# Patient Record
Sex: FEMALE | Race: WHITE | NOT HISPANIC OR LATINO | Employment: OTHER | URBAN - METROPOLITAN AREA
[De-identification: names, ages, dates, MRNs, and addresses within clinical notes are randomized per-mention and may not be internally consistent; named-entity substitution may affect disease eponyms.]

---

## 2020-04-15 ENCOUNTER — APPOINTMENT (OUTPATIENT)
Dept: RADIOLOGY | Facility: MEDICAL CENTER | Age: 70
End: 2020-04-15
Attending: INTERNAL MEDICINE
Payer: MEDICARE

## 2021-03-11 ENCOUNTER — IMMUNIZATION (OUTPATIENT)
Dept: FAMILY PLANNING/WOMEN'S HEALTH CLINIC | Facility: IMMUNIZATION CENTER | Age: 71
End: 2021-03-11
Payer: MEDICARE

## 2021-03-11 DIAGNOSIS — Z23 ENCOUNTER FOR VACCINATION: Primary | ICD-10-CM

## 2021-03-11 PROCEDURE — 0001A PFIZER SARS-COV-2 VACCINE: CPT | Performed by: INTERNAL MEDICINE

## 2021-03-11 PROCEDURE — 91300 PFIZER SARS-COV-2 VACCINE: CPT | Performed by: INTERNAL MEDICINE

## 2021-04-02 ENCOUNTER — IMMUNIZATION (OUTPATIENT)
Dept: FAMILY PLANNING/WOMEN'S HEALTH CLINIC | Facility: IMMUNIZATION CENTER | Age: 71
End: 2021-04-02
Attending: INTERNAL MEDICINE
Payer: MEDICARE

## 2021-04-02 DIAGNOSIS — Z23 ENCOUNTER FOR VACCINATION: Primary | ICD-10-CM

## 2021-04-02 PROCEDURE — 0002A PFIZER SARS-COV-2 VACCINE: CPT

## 2021-04-02 PROCEDURE — 91300 PFIZER SARS-COV-2 VACCINE: CPT

## 2022-04-29 ENCOUNTER — APPOINTMENT (OUTPATIENT)
Dept: RADIOLOGY | Facility: MEDICAL CENTER | Age: 72
DRG: 481 | End: 2022-04-29
Attending: EMERGENCY MEDICINE
Payer: MEDICARE

## 2022-04-29 ENCOUNTER — ANESTHESIA EVENT (OUTPATIENT)
Dept: SURGERY | Facility: MEDICAL CENTER | Age: 72
DRG: 481 | End: 2022-04-29
Payer: MEDICARE

## 2022-04-29 ENCOUNTER — HOSPITAL ENCOUNTER (INPATIENT)
Facility: MEDICAL CENTER | Age: 72
LOS: 4 days | DRG: 481 | End: 2022-05-03
Attending: EMERGENCY MEDICINE | Admitting: STUDENT IN AN ORGANIZED HEALTH CARE EDUCATION/TRAINING PROGRAM
Payer: MEDICARE

## 2022-04-29 ENCOUNTER — APPOINTMENT (OUTPATIENT)
Dept: RADIOLOGY | Facility: MEDICAL CENTER | Age: 72
DRG: 481 | End: 2022-04-29
Attending: ORTHOPAEDIC SURGERY
Payer: MEDICARE

## 2022-04-29 ENCOUNTER — ANESTHESIA (OUTPATIENT)
Dept: SURGERY | Facility: MEDICAL CENTER | Age: 72
DRG: 481 | End: 2022-04-29
Payer: MEDICARE

## 2022-04-29 DIAGNOSIS — S72.001A CLOSED FRACTURE OF RIGHT HIP, INITIAL ENCOUNTER (HCC): ICD-10-CM

## 2022-04-29 DIAGNOSIS — S72.001A CLOSED FRACTURE OF NECK OF RIGHT FEMUR, INITIAL ENCOUNTER (HCC): ICD-10-CM
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
## 2022-04-29 DIAGNOSIS — S62.101A CLOSED FRACTURE OF RIGHT WRIST, INITIAL ENCOUNTER: ICD-10-CM

## 2022-04-29 LAB
ABO GROUP BLD: NORMAL
ALBUMIN SERPL BCP-MCNC: 4 G/DL (ref 3.2–4.9)
ALBUMIN/GLOB SERPL: 1.7 G/DL
ALP SERPL-CCNC: 76 U/L (ref 30–99)
ALT SERPL-CCNC: 32 U/L (ref 2–50)
ANION GAP SERPL CALC-SCNC: 14 MMOL/L (ref 7–16)
APTT PPP: 26.6 SEC (ref 24.7–36)
AST SERPL-CCNC: 27 U/L (ref 12–45)
BASOPHILS # BLD AUTO: 0.1 % (ref 0–1.8)
BASOPHILS # BLD: 0.01 K/UL (ref 0–0.12)
BILIRUB SERPL-MCNC: 0.3 MG/DL (ref 0.1–1.5)
BLD GP AB SCN SERPL QL: NORMAL
BUN SERPL-MCNC: 21 MG/DL (ref 8–22)
CALCIUM SERPL-MCNC: 9.3 MG/DL (ref 8.5–10.5)
CHLORIDE SERPL-SCNC: 105 MMOL/L (ref 96–112)
CO2 SERPL-SCNC: 20 MMOL/L (ref 20–33)
CREAT SERPL-MCNC: 0.73 MG/DL (ref 0.5–1.4)
EKG IMPRESSION: NORMAL
EOSINOPHIL # BLD AUTO: 0.01 K/UL (ref 0–0.51)
EOSINOPHIL NFR BLD: 0.1 % (ref 0–6.9)
ERYTHROCYTE [DISTWIDTH] IN BLOOD BY AUTOMATED COUNT: 51 FL (ref 35.9–50)
GFR SERPLBLD CREATININE-BSD FMLA CKD-EPI: 88 ML/MIN/1.73 M 2
GLOBULIN SER CALC-MCNC: 2.4 G/DL (ref 1.9–3.5)
GLUCOSE SERPL-MCNC: 112 MG/DL (ref 65–99)
HCT VFR BLD AUTO: 39.1 % (ref 37–47)
HGB BLD-MCNC: 12.7 G/DL (ref 12–16)
IMM GRANULOCYTES # BLD AUTO: 0.06 K/UL (ref 0–0.11)
IMM GRANULOCYTES NFR BLD AUTO: 0.7 % (ref 0–0.9)
INR PPP: 1.03 (ref 0.87–1.13)
LYMPHOCYTES # BLD AUTO: 1.2 K/UL (ref 1–4.8)
LYMPHOCYTES NFR BLD: 14.2 % (ref 22–41)
MCH RBC QN AUTO: 32.4 PG (ref 27–33)
MCHC RBC AUTO-ENTMCNC: 32.5 G/DL (ref 33.6–35)
MCV RBC AUTO: 99.7 FL (ref 81.4–97.8)
MONOCYTES # BLD AUTO: 0.61 K/UL (ref 0–0.85)
MONOCYTES NFR BLD AUTO: 7.2 % (ref 0–13.4)
NEUTROPHILS # BLD AUTO: 6.59 K/UL (ref 2–7.15)
NEUTROPHILS NFR BLD: 77.7 % (ref 44–72)
NRBC # BLD AUTO: 0 K/UL
NRBC BLD-RTO: 0 /100 WBC
PLATELET # BLD AUTO: 69 K/UL (ref 164–446)
PMV BLD AUTO: 10 FL (ref 9–12.9)
POTASSIUM SERPL-SCNC: 4 MMOL/L (ref 3.6–5.5)
PROT SERPL-MCNC: 6.4 G/DL (ref 6–8.2)
PROTHROMBIN TIME: 13.2 SEC (ref 12–14.6)
RBC # BLD AUTO: 3.92 M/UL (ref 4.2–5.4)
RH BLD: NORMAL
SARS-COV+SARS-COV-2 AG RESP QL IA.RAPID: NOTDETECTED
SODIUM SERPL-SCNC: 139 MMOL/L (ref 135–145)
SPECIMEN SOURCE: NORMAL
WBC # BLD AUTO: 8.5 K/UL (ref 4.8–10.8)

## 2022-04-29 PROCEDURE — 64447 NJX AA&/STRD FEMORAL NRV IMG: CPT | Mod: 59 | Performed by: STUDENT IN AN ORGANIZED HEALTH CARE EDUCATION/TRAINING PROGRAM

## 2022-04-29 PROCEDURE — 0QS604Z REPOSITION RIGHT UPPER FEMUR WITH INTERNAL FIXATION DEVICE, OPEN APPROACH: ICD-10-PCS | Performed by: ORTHOPAEDIC SURGERY

## 2022-04-29 PROCEDURE — 86850 RBC ANTIBODY SCREEN: CPT

## 2022-04-29 PROCEDURE — 700102 HCHG RX REV CODE 250 W/ 637 OVERRIDE(OP): Performed by: PHYSICIAN ASSISTANT

## 2022-04-29 PROCEDURE — 87426 SARSCOV CORONAVIRUS AG IA: CPT

## 2022-04-29 PROCEDURE — 700111 HCHG RX REV CODE 636 W/ 250 OVERRIDE (IP): Performed by: EMERGENCY MEDICINE

## 2022-04-29 PROCEDURE — 501838 HCHG SUTURE GENERAL: Performed by: ORTHOPAEDIC SURGERY

## 2022-04-29 PROCEDURE — 76942 ECHO GUIDE FOR BIOPSY: CPT | Mod: 26 | Performed by: STUDENT IN AN ORGANIZED HEALTH CARE EDUCATION/TRAINING PROGRAM

## 2022-04-29 PROCEDURE — 700101 HCHG RX REV CODE 250: Performed by: STUDENT IN AN ORGANIZED HEALTH CARE EDUCATION/TRAINING PROGRAM

## 2022-04-29 PROCEDURE — 160035 HCHG PACU - 1ST 60 MINS PHASE I: Performed by: ORTHOPAEDIC SURGERY

## 2022-04-29 PROCEDURE — 96375 TX/PRO/DX INJ NEW DRUG ADDON: CPT

## 2022-04-29 PROCEDURE — 64447 NJX AA&/STRD FEMORAL NRV IMG: CPT | Performed by: ORTHOPAEDIC SURGERY

## 2022-04-29 PROCEDURE — 160036 HCHG PACU - EA ADDL 30 MINS PHASE I: Performed by: ORTHOPAEDIC SURGERY

## 2022-04-29 PROCEDURE — 160041 HCHG SURGERY MINUTES - EA ADDL 1 MIN LEVEL 4: Performed by: ORTHOPAEDIC SURGERY

## 2022-04-29 PROCEDURE — 80053 COMPREHEN METABOLIC PANEL: CPT

## 2022-04-29 PROCEDURE — 73552 X-RAY EXAM OF FEMUR 2/>: CPT | Mod: RT

## 2022-04-29 PROCEDURE — 160009 HCHG ANES TIME/MIN: Performed by: ORTHOPAEDIC SURGERY

## 2022-04-29 PROCEDURE — 160029 HCHG SURGERY MINUTES - 1ST 30 MINS LEVEL 4: Performed by: ORTHOPAEDIC SURGERY

## 2022-04-29 PROCEDURE — 96374 THER/PROPH/DIAG INJ IV PUSH: CPT

## 2022-04-29 PROCEDURE — 502000 HCHG MISC OR IMPLANTS RC 0278: Performed by: ORTHOPAEDIC SURGERY

## 2022-04-29 PROCEDURE — 71045 X-RAY EXAM CHEST 1 VIEW: CPT

## 2022-04-29 PROCEDURE — 99291 CRITICAL CARE FIRST HOUR: CPT

## 2022-04-29 PROCEDURE — 99223 1ST HOSP IP/OBS HIGH 75: CPT | Mod: AI | Performed by: STUDENT IN AN ORGANIZED HEALTH CARE EDUCATION/TRAINING PROGRAM

## 2022-04-29 PROCEDURE — 93005 ELECTROCARDIOGRAM TRACING: CPT | Performed by: EMERGENCY MEDICINE

## 2022-04-29 PROCEDURE — 73502 X-RAY EXAM HIP UNI 2-3 VIEWS: CPT | Mod: RT

## 2022-04-29 PROCEDURE — C1713 ANCHOR/SCREW BN/BN,TIS/BN: HCPCS | Performed by: ORTHOPAEDIC SURGERY

## 2022-04-29 PROCEDURE — 73110 X-RAY EXAM OF WRIST: CPT | Mod: RT

## 2022-04-29 PROCEDURE — 85025 COMPLETE CBC W/AUTO DIFF WBC: CPT

## 2022-04-29 PROCEDURE — 86900 BLOOD TYPING SEROLOGIC ABO: CPT

## 2022-04-29 PROCEDURE — 99100 ANES PT EXTEME AGE<1 YR&>70: CPT | Performed by: STUDENT IN AN ORGANIZED HEALTH CARE EDUCATION/TRAINING PROGRAM

## 2022-04-29 PROCEDURE — 770001 HCHG ROOM/CARE - MED/SURG/GYN PRIV*

## 2022-04-29 PROCEDURE — 700111 HCHG RX REV CODE 636 W/ 250 OVERRIDE (IP): Performed by: STUDENT IN AN ORGANIZED HEALTH CARE EDUCATION/TRAINING PROGRAM

## 2022-04-29 PROCEDURE — 85610 PROTHROMBIN TIME: CPT

## 2022-04-29 PROCEDURE — 160002 HCHG RECOVERY MINUTES (STAT): Performed by: ORTHOPAEDIC SURGERY

## 2022-04-29 PROCEDURE — 01230 ANES OPN UPPER 2/3 FEMUR NOS: CPT | Performed by: STUDENT IN AN ORGANIZED HEALTH CARE EDUCATION/TRAINING PROGRAM

## 2022-04-29 PROCEDURE — 160048 HCHG OR STATISTICAL LEVEL 1-5: Performed by: ORTHOPAEDIC SURGERY

## 2022-04-29 PROCEDURE — 73560 X-RAY EXAM OF KNEE 1 OR 2: CPT | Mod: RT

## 2022-04-29 PROCEDURE — A9270 NON-COVERED ITEM OR SERVICE: HCPCS | Performed by: PHYSICIAN ASSISTANT

## 2022-04-29 PROCEDURE — A9270 NON-COVERED ITEM OR SERVICE: HCPCS

## 2022-04-29 PROCEDURE — 2W3CX1Z IMMOBILIZATION OF RIGHT LOWER ARM USING SPLINT: ICD-10-PCS | Performed by: ORTHOPAEDIC SURGERY

## 2022-04-29 PROCEDURE — 72170 X-RAY EXAM OF PELVIS: CPT

## 2022-04-29 PROCEDURE — 86901 BLOOD TYPING SEROLOGIC RH(D): CPT

## 2022-04-29 PROCEDURE — 85730 THROMBOPLASTIN TIME PARTIAL: CPT

## 2022-04-29 PROCEDURE — 700105 HCHG RX REV CODE 258: Performed by: STUDENT IN AN ORGANIZED HEALTH CARE EDUCATION/TRAINING PROGRAM

## 2022-04-29 PROCEDURE — 700102 HCHG RX REV CODE 250 W/ 637 OVERRIDE(OP)

## 2022-04-29 PROCEDURE — 36415 COLL VENOUS BLD VENIPUNCTURE: CPT

## 2022-04-29 DEVICE — IMPLANTABLE DEVICE: Type: IMPLANTABLE DEVICE | Site: FEMUR | Status: FUNCTIONAL

## 2022-04-29 RX ORDER — LIDOCAINE HYDROCHLORIDE 20 MG/ML
INJECTION, SOLUTION EPIDURAL; INFILTRATION; INTRACAUDAL; PERINEURAL PRN
Status: DISCONTINUED | OUTPATIENT
Start: 2022-04-29 | End: 2022-04-29 | Stop reason: SURG

## 2022-04-29 RX ORDER — HYDROMORPHONE HYDROCHLORIDE 1 MG/ML
0.2 INJECTION, SOLUTION INTRAMUSCULAR; INTRAVENOUS; SUBCUTANEOUS
Status: DISCONTINUED | OUTPATIENT
Start: 2022-04-29 | End: 2022-04-30 | Stop reason: HOSPADM

## 2022-04-29 RX ORDER — ONDANSETRON 2 MG/ML
4 INJECTION INTRAMUSCULAR; INTRAVENOUS ONCE
Status: COMPLETED | OUTPATIENT
Start: 2022-04-29 | End: 2022-04-29

## 2022-04-29 RX ORDER — HYDROMORPHONE HYDROCHLORIDE 1 MG/ML
0.4 INJECTION, SOLUTION INTRAMUSCULAR; INTRAVENOUS; SUBCUTANEOUS
Status: DISCONTINUED | OUTPATIENT
Start: 2022-04-29 | End: 2022-04-30 | Stop reason: HOSPADM

## 2022-04-29 RX ORDER — OXYCODONE HCL 5 MG/5 ML
10 SOLUTION, ORAL ORAL
Status: DISCONTINUED | OUTPATIENT
Start: 2022-04-29 | End: 2022-04-30 | Stop reason: HOSPADM

## 2022-04-29 RX ORDER — CEFAZOLIN SODIUM 1 G/3ML
INJECTION, POWDER, FOR SOLUTION INTRAMUSCULAR; INTRAVENOUS PRN
Status: DISCONTINUED | OUTPATIENT
Start: 2022-04-29 | End: 2022-04-29 | Stop reason: SURG

## 2022-04-29 RX ORDER — HYDRALAZINE HYDROCHLORIDE 20 MG/ML
5 INJECTION INTRAMUSCULAR; INTRAVENOUS
Status: DISCONTINUED | OUTPATIENT
Start: 2022-04-29 | End: 2022-04-30 | Stop reason: HOSPADM

## 2022-04-29 RX ORDER — HALOPERIDOL 5 MG/ML
1 INJECTION INTRAMUSCULAR
Status: DISCONTINUED | OUTPATIENT
Start: 2022-04-29 | End: 2022-04-30 | Stop reason: HOSPADM

## 2022-04-29 RX ORDER — ONDANSETRON 2 MG/ML
INJECTION INTRAMUSCULAR; INTRAVENOUS PRN
Status: DISCONTINUED | OUTPATIENT
Start: 2022-04-29 | End: 2022-04-29 | Stop reason: SURG

## 2022-04-29 RX ORDER — HYDROMORPHONE HYDROCHLORIDE 2 MG/ML
INJECTION, SOLUTION INTRAMUSCULAR; INTRAVENOUS; SUBCUTANEOUS PRN
Status: DISCONTINUED | OUTPATIENT
Start: 2022-04-29 | End: 2022-04-29 | Stop reason: SURG

## 2022-04-29 RX ORDER — PROPOFOL 10 MG/ML
INJECTION, EMULSION INTRAVENOUS PRN
Status: DISCONTINUED | OUTPATIENT
Start: 2022-04-29 | End: 2022-04-29 | Stop reason: SURG

## 2022-04-29 RX ORDER — DIPHENHYDRAMINE HYDROCHLORIDE 50 MG/ML
12.5 INJECTION INTRAMUSCULAR; INTRAVENOUS
Status: DISCONTINUED | OUTPATIENT
Start: 2022-04-29 | End: 2022-04-30 | Stop reason: HOSPADM

## 2022-04-29 RX ORDER — ACETAMINOPHEN 500 MG
1000 TABLET ORAL EVERY 6 HOURS PRN
Status: DISCONTINUED | OUTPATIENT
Start: 2022-04-29 | End: 2022-05-03 | Stop reason: HOSPADM

## 2022-04-29 RX ORDER — GABAPENTIN 300 MG/1
CAPSULE ORAL
Status: COMPLETED
Start: 2022-04-29 | End: 2022-04-29

## 2022-04-29 RX ORDER — DEXAMETHASONE SODIUM PHOSPHATE 4 MG/ML
INJECTION, SOLUTION INTRA-ARTICULAR; INTRALESIONAL; INTRAMUSCULAR; INTRAVENOUS; SOFT TISSUE PRN
Status: DISCONTINUED | OUTPATIENT
Start: 2022-04-29 | End: 2022-04-29 | Stop reason: SURG

## 2022-04-29 RX ORDER — TRANEXAMIC ACID 100 MG/ML
INJECTION, SOLUTION INTRAVENOUS PRN
Status: DISCONTINUED | OUTPATIENT
Start: 2022-04-29 | End: 2022-04-29 | Stop reason: SURG

## 2022-04-29 RX ORDER — MORPHINE SULFATE 4 MG/ML
2 INJECTION INTRAVENOUS ONCE
Status: COMPLETED | OUTPATIENT
Start: 2022-04-29 | End: 2022-04-29

## 2022-04-29 RX ORDER — VITAMIN B COMPLEX
1000 TABLET ORAL EVERY MORNING
COMMUNITY

## 2022-04-29 RX ORDER — SODIUM CHLORIDE, SODIUM LACTATE, POTASSIUM CHLORIDE, CALCIUM CHLORIDE 600; 310; 30; 20 MG/100ML; MG/100ML; MG/100ML; MG/100ML
INJECTION, SOLUTION INTRAVENOUS
Status: DISCONTINUED | OUTPATIENT
Start: 2022-04-29 | End: 2022-04-29 | Stop reason: SURG

## 2022-04-29 RX ORDER — MEPERIDINE HYDROCHLORIDE 25 MG/ML
12.5 INJECTION INTRAMUSCULAR; INTRAVENOUS; SUBCUTANEOUS
Status: DISCONTINUED | OUTPATIENT
Start: 2022-04-29 | End: 2022-04-30 | Stop reason: HOSPADM

## 2022-04-29 RX ORDER — ONDANSETRON 2 MG/ML
4 INJECTION INTRAMUSCULAR; INTRAVENOUS
Status: DISCONTINUED | OUTPATIENT
Start: 2022-04-29 | End: 2022-04-30 | Stop reason: HOSPADM

## 2022-04-29 RX ORDER — HYDROMORPHONE HYDROCHLORIDE 1 MG/ML
0.1 INJECTION, SOLUTION INTRAMUSCULAR; INTRAVENOUS; SUBCUTANEOUS
Status: DISCONTINUED | OUTPATIENT
Start: 2022-04-29 | End: 2022-04-30 | Stop reason: HOSPADM

## 2022-04-29 RX ORDER — ENOXAPARIN SODIUM 100 MG/ML
40 INJECTION SUBCUTANEOUS DAILY
Status: DISCONTINUED | OUTPATIENT
Start: 2022-04-30 | End: 2022-05-03 | Stop reason: HOSPADM

## 2022-04-29 RX ORDER — OXYCODONE HCL 5 MG/5 ML
5 SOLUTION, ORAL ORAL
Status: DISCONTINUED | OUTPATIENT
Start: 2022-04-29 | End: 2022-04-30 | Stop reason: HOSPADM

## 2022-04-29 RX ORDER — ROCURONIUM BROMIDE 10 MG/ML
INJECTION, SOLUTION INTRAVENOUS PRN
Status: DISCONTINUED | OUTPATIENT
Start: 2022-04-29 | End: 2022-04-29 | Stop reason: SURG

## 2022-04-29 RX ORDER — ROPIVACAINE HYDROCHLORIDE 5 MG/ML
INJECTION, SOLUTION EPIDURAL; INFILTRATION; PERINEURAL
Status: COMPLETED | OUTPATIENT
Start: 2022-04-29 | End: 2022-04-29

## 2022-04-29 RX ORDER — METOPROLOL TARTRATE 1 MG/ML
1 INJECTION, SOLUTION INTRAVENOUS
Status: DISCONTINUED | OUTPATIENT
Start: 2022-04-29 | End: 2022-04-30 | Stop reason: HOSPADM

## 2022-04-29 RX ORDER — LABETALOL HYDROCHLORIDE 5 MG/ML
5 INJECTION, SOLUTION INTRAVENOUS
Status: DISCONTINUED | OUTPATIENT
Start: 2022-04-29 | End: 2022-04-30 | Stop reason: HOSPADM

## 2022-04-29 RX ADMIN — PROPOFOL 150 MG: 10 INJECTION, EMULSION INTRAVENOUS at 20:53

## 2022-04-29 RX ADMIN — SODIUM CHLORIDE, POTASSIUM CHLORIDE, SODIUM LACTATE AND CALCIUM CHLORIDE: 600; 310; 30; 20 INJECTION, SOLUTION INTRAVENOUS at 20:48

## 2022-04-29 RX ADMIN — GABAPENTIN 300 MG: 300 CAPSULE ORAL at 20:33

## 2022-04-29 RX ADMIN — ACETAMINOPHEN 1000 MG: 500 TABLET ORAL at 20:34

## 2022-04-29 RX ADMIN — CEFAZOLIN 2 G: 330 INJECTION, POWDER, FOR SOLUTION INTRAMUSCULAR; INTRAVENOUS at 20:54

## 2022-04-29 RX ADMIN — HYDROMORPHONE HYDROCHLORIDE 0.4 MG: 2 INJECTION INTRAMUSCULAR; INTRAVENOUS; SUBCUTANEOUS at 21:29

## 2022-04-29 RX ADMIN — ONDANSETRON 4 MG: 2 INJECTION INTRAMUSCULAR; INTRAVENOUS at 20:55

## 2022-04-29 RX ADMIN — LIDOCAINE HYDROCHLORIDE 80 MG: 20 INJECTION, SOLUTION EPIDURAL; INFILTRATION; INTRACAUDAL at 20:53

## 2022-04-29 RX ADMIN — DEXAMETHASONE SODIUM PHOSPHATE 4 MG: 4 INJECTION, SOLUTION INTRA-ARTICULAR; INTRALESIONAL; INTRAMUSCULAR; INTRAVENOUS; SOFT TISSUE at 20:55

## 2022-04-29 RX ADMIN — HYDROMORPHONE HYDROCHLORIDE 0.6 MG: 2 INJECTION INTRAMUSCULAR; INTRAVENOUS; SUBCUTANEOUS at 20:53

## 2022-04-29 RX ADMIN — ONDANSETRON 4 MG: 2 INJECTION INTRAMUSCULAR; INTRAVENOUS at 18:55

## 2022-04-29 RX ADMIN — MORPHINE SULFATE 2 MG: 4 INJECTION INTRAVENOUS at 18:56

## 2022-04-29 RX ADMIN — ROCURONIUM BROMIDE 30 MG: 10 INJECTION, SOLUTION INTRAVENOUS at 21:29

## 2022-04-29 RX ADMIN — ROPIVACAINE HYDROCHLORIDE 30 ML: 5 INJECTION, SOLUTION EPIDURAL; INFILTRATION; PERINEURAL at 21:00

## 2022-04-29 RX ADMIN — ALFENTANIL HYDROCHLORIDE 1000 MCG: 500 INJECTION INTRAVENOUS at 20:53

## 2022-04-29 RX ADMIN — ROCURONIUM BROMIDE 50 MG: 10 INJECTION, SOLUTION INTRAVENOUS at 20:53

## 2022-04-29 RX ADMIN — TRANEXAMIC ACID 2000 MG: 100 INJECTION, SOLUTION INTRAVENOUS at 21:11

## 2022-04-29 ASSESSMENT — ENCOUNTER SYMPTOMS
ABDOMINAL PAIN: 0
NECK PAIN: 0

## 2022-04-29 ASSESSMENT — PAIN DESCRIPTION - PAIN TYPE
TYPE: SURGICAL PAIN

## 2022-04-30 PROBLEM — S52.501A CLOSED FRACTURE OF DISTAL END OF RIGHT RADIUS: Status: ACTIVE | Noted: 2022-04-30

## 2022-04-30 PROBLEM — D69.6 THROMBOCYTOPENIA (HCC): Status: ACTIVE | Noted: 2022-04-30

## 2022-04-30 PROBLEM — E66.9 OBESITY (BMI 30-39.9): Status: ACTIVE | Noted: 2022-04-30

## 2022-04-30 PROBLEM — D75.89 MACROCYTOSIS: Status: ACTIVE | Noted: 2022-04-30

## 2022-04-30 PROCEDURE — 97165 OT EVAL LOW COMPLEX 30 MIN: CPT

## 2022-04-30 PROCEDURE — 770001 HCHG ROOM/CARE - MED/SURG/GYN PRIV*

## 2022-04-30 PROCEDURE — A9270 NON-COVERED ITEM OR SERVICE: HCPCS | Performed by: STUDENT IN AN ORGANIZED HEALTH CARE EDUCATION/TRAINING PROGRAM

## 2022-04-30 PROCEDURE — 97162 PT EVAL MOD COMPLEX 30 MIN: CPT

## 2022-04-30 PROCEDURE — 700102 HCHG RX REV CODE 250 W/ 637 OVERRIDE(OP): Performed by: STUDENT IN AN ORGANIZED HEALTH CARE EDUCATION/TRAINING PROGRAM

## 2022-04-30 PROCEDURE — 99232 SBSQ HOSP IP/OBS MODERATE 35: CPT | Performed by: HOSPITALIST

## 2022-04-30 PROCEDURE — 700111 HCHG RX REV CODE 636 W/ 250 OVERRIDE (IP): Performed by: ORTHOPAEDIC SURGERY

## 2022-04-30 PROCEDURE — 700111 HCHG RX REV CODE 636 W/ 250 OVERRIDE (IP): Performed by: STUDENT IN AN ORGANIZED HEALTH CARE EDUCATION/TRAINING PROGRAM

## 2022-04-30 RX ORDER — OXYCODONE HYDROCHLORIDE 5 MG/1
5 TABLET ORAL EVERY 4 HOURS PRN
Status: COMPLETED | OUTPATIENT
Start: 2022-04-30 | End: 2022-04-30

## 2022-04-30 RX ORDER — CEFAZOLIN SODIUM 2 G/100ML
2 INJECTION, SOLUTION INTRAVENOUS EVERY 8 HOURS
Status: COMPLETED | OUTPATIENT
Start: 2022-04-30 | End: 2022-04-30

## 2022-04-30 RX ADMIN — ENOXAPARIN SODIUM 40 MG: 40 INJECTION SUBCUTANEOUS at 05:00

## 2022-04-30 RX ADMIN — OXYCODONE 5 MG: 5 TABLET ORAL at 19:12

## 2022-04-30 RX ADMIN — OXYCODONE 5 MG: 5 TABLET ORAL at 08:59

## 2022-04-30 RX ADMIN — OXYCODONE 5 MG: 5 TABLET ORAL at 13:06

## 2022-04-30 RX ADMIN — CEFAZOLIN SODIUM 2 G: 2 INJECTION, SOLUTION INTRAVENOUS at 12:50

## 2022-04-30 RX ADMIN — CEFAZOLIN SODIUM 2 G: 2 INJECTION, SOLUTION INTRAVENOUS at 04:53

## 2022-04-30 ASSESSMENT — COGNITIVE AND FUNCTIONAL STATUS - GENERAL
DRESSING REGULAR LOWER BODY CLOTHING: A LITTLE
DAILY ACTIVITIY SCORE: 19
TOILETING: A LITTLE
PERSONAL GROOMING: A LITTLE
STANDING UP FROM CHAIR USING ARMS: A LOT
DRESSING REGULAR UPPER BODY CLOTHING: A LITTLE
CLIMB 3 TO 5 STEPS WITH RAILING: TOTAL
HELP NEEDED FOR BATHING: A LITTLE
SUGGESTED CMS G CODE MODIFIER MOBILITY: CM
WALKING IN HOSPITAL ROOM: TOTAL
MOBILITY SCORE: 7
MOVING FROM LYING ON BACK TO SITTING ON SIDE OF FLAT BED: UNABLE
SUGGESTED CMS G CODE MODIFIER DAILY ACTIVITY: CK
MOVING TO AND FROM BED TO CHAIR: UNABLE
TURNING FROM BACK TO SIDE WHILE IN FLAT BAD: UNABLE

## 2022-04-30 ASSESSMENT — ENCOUNTER SYMPTOMS
NAUSEA: 0
CLAUDICATION: 0
FALLS: 1
PALPITATIONS: 0
ABDOMINAL PAIN: 0
HEMOPTYSIS: 0
MYALGIAS: 1
SORE THROAT: 0
HEADACHES: 0
VOMITING: 0
SHORTNESS OF BREATH: 0
NERVOUS/ANXIOUS: 0
ORTHOPNEA: 0
FOCAL WEAKNESS: 0
COUGH: 0
SPUTUM PRODUCTION: 0
DEPRESSION: 0
HEARTBURN: 0

## 2022-04-30 ASSESSMENT — PAIN DESCRIPTION - PAIN TYPE
TYPE: ACUTE PAIN
TYPE: ACUTE PAIN
TYPE: ACUTE PAIN;SURGICAL PAIN
TYPE: ACUTE PAIN;SURGICAL PAIN

## 2022-04-30 ASSESSMENT — ACTIVITIES OF DAILY LIVING (ADL): TOILETING: INDEPENDENT

## 2022-04-30 ASSESSMENT — GAIT ASSESSMENTS
ASSISTIVE DEVICE: FRONT WHEEL WALKER
GAIT LEVEL OF ASSIST: UNABLE TO PARTICIPATE

## 2022-04-30 NOTE — PROGRESS NOTES
"0019 Patient arrived from PACU with PACU RN, Jocelynn. Patient drowsy, but arouses to voice. Patient is bradycardic, on 4L oxy-mask. Assessment complete. Patient is A&0 x 4.    0056 Paged Dr. Diaz about patient's low heart rate. Dr. Diaz stated to monitor and alert him if \"heart rate drops to 30s.\"  "

## 2022-04-30 NOTE — PROGRESS NOTES
4 Eyes Skin Assessment Completed by ERIC Castellano and Kendra RN.    Head WDL  Ears WDL  Nose WDL  Mouth WDL  Neck WDL  Breast/Chest WDL  Shoulder Blades WDL  Spine WDL  (R) Arm/Elbow/Hand : splint in place  (L) Arm/Elbow/Hand WDL  Abdomen WDL  Groin WDL  Scrotum/Coccyx/Buttocks : skin tear, dressing in place, Wound consult placed by night shift RN  (R) Leg Incision to right hip, dressing in place  (L) Leg WDL  (R) Heel/Foot/Toe WDL  (L) Heel/Foot/Toe WDL          Devices In Places Pulse Ox, SCD's and Nasal Cannula      Interventions In Place Gray Ear Foams, Sacral Mepilex, Pillows, Dri-Ki Pads, Heels Loaded W/Pillows and Pressure Redistribution Mattress    Possible Skin Injury Yes    Pictures Uploaded Into Epic Yes  Wound Consult Placed Yes  RN Wound Prevention Protocol Ordered Yes

## 2022-04-30 NOTE — OP REPORT
DATE OF SERVICE:  04/29/2022     PREOPERATIVE DIAGNOSES:  1.  Right nondisplaced femoral neck fracture.  2.  Right minimally displaced distal radial metaphyseal fracture.     POSTOPERATIVE DIAGNOSES:  1.  Right nondisplaced femoral neck fracture.  2.  Right minimally displaced distal radial metaphyseal fracture.     PROCEDURES PERFORMED:  1.  Open treatment with internal fixation of right femoral neck fracture.  2.  Closed treatment without manipulation, right extra-articular distal radius   fracture.     SURGEON:  Cassius Zuniga MD     ANESTHESIOLOGIST:  Troy Porter MD     ANESTHESIA:  General.     ESTIMATED BLOOD LOSS:  300 mL.     IMPLANTS:  Synthes 2-hole femoral neck system with a 100 mm bolt and 100 mm   anti-rotation screw and two 5.0 mm locking screws.     INDICATIONS FOR PROCEDURE:  The patient is a 72-year-old female.  She had a   fall, sustained a right nondisplaced slightly valgus impacted femoral neck   fracture as well as a right minimally displaced distal radial metaphyseal   fracture.  She was seen in the emergency department, evaluated for admission   to the hospitalist service, felt to be medically optimized for surgical   management.  I recommended surgical reduction and fixation of her femoral neck   fracture to prevent fracture displacement and allow for early mobility and   partial weightbearing as well as nonoperative management of right distal   radius fracture.  She signed informed consent preoperatively and wished to   proceed with surgery.     DESCRIPTION OF PROCEDURE:  The patient was met in the preoperative holding   area.  Her surgical site was signed.  Her consent was confirmed to be   accurate.  She was taken back to the operating room and general anesthesia was   induced.  She was positioned on the fracture table in supine position.  No   traction was applied but the right lower extremity was placed on the traction   boot and slight flexion and adduction.  The left lower  extremity was suspended   in extension.  The right thigh was then prepped and draped in the usual   sterile fashion.  A formal timeout was performed to confirm the patient's   correct name, correct surgical site, correct procedure and correct laterality.    Fluoroscopic imaging confirmed maintained nondisplaced alignment of her   femoral neck fracture.  I made an incision over the intertrochanteric femur   area with scalpel down through skin.  Dissection was carried with Bovie   cautery down to the iliotibial band, which was split longitudinally.  A Mccord   elevator was used to elevate the vastus lateralis and I inserted the guide for   the Synthes femoral neck system and a guide pin was inserted just inferior to   the center of the femoral head on the AP view and in the center of the   femoral head on the lateral view with an appropriate starting point.  I then   prepared for a 100 mm bolt and drilled it and then assembled a 2-hole plate   with a 100 mm bolt inserted into place, confirmed it was sufficiently rotated,   prepared for and inserted 100 mm anti-rotation screw and confirmed it was   well seated using the torque-limiting screwdriver and then using the insertion   instrumentation placed two 5.0 mm locking screws from lateral to medial, all   the insertion instrumentation was removed and final fluoroscopic imaging   confirmed overall acceptable alignment of the fracture and acceptable position   of the implants.  She did have quite soft bone across the proximal femur in   the neck area based on the ease of reaming but had reasonable cortices   distally when drilling via locking screws.  The wound was then thoroughly   irrigated with normal saline.  I repaired the soft tissue layers with Vicryl   suture and the skin edges with staples.  Wounds were thoroughly cleansed and   dried and sterile dressing was applied.  The drapes were then removed, turned   my attention to the right wrist.  I placed her into a  well-padded short-arm   plaster splint to stabilize her minimally displaced distal radial metaphyseal   fracture, which was treated nonoperatively without manipulation.  She was then   awoken from anesthesia and transferred on the gurney and taken to   postanesthesia care unit in stable condition.     PLAN:  1.  The patient will be readmitted to medical service postop.  2.  Recommend should be toe touch weightbearing for a short period of time   postoperatively just given her poor bone quality and the fact she really has   minimal valgus alignment, more consistent with nondisplaced femoral neck   fracture.  I feel beneficial and protected weightbearing to prevent   significant collapse given her poor bone quality.  3.  She can weightbear through the right elbow with a platform walker but   should not weightbear through her hand and wrist.  4.  She will need Ancef for 2 doses postop for infection prophylaxis.  5.  She should work with physical and occupational therapy as soon as possible   for mobilization.  6.  She will be started on Lovenox or equivalent tomorrow morning if otherwise   clinically appropriate and should continue SCDs for DVT prophylaxis in the   meantime.  7.  Ultimately, she will need to follow up with me 2 weeks postop for routine   wound check and staple removal as well as x-rays.        ______________________________  MD DOROTA Sanchez/MIKIE    DD:  04/29/2022 22:40  DT:  04/29/2022 23:22    Job#:  [id number]

## 2022-04-30 NOTE — PROGRESS NOTES
[facility] Medicine Daily Progress Note    Date of Service  4/30/2022    Chief Complaint  Carmita Miller is a 72 y.o. female admitted 4/29/2022 with   Chief Complaint   Patient presents with   • T-5000 GLF     Pt fell onto her R side after a dog caused her to fall onto her R wrist and R hip. Pt has R hip and R wrist pain. No obvious deformities, CMS intact         Hospital Course  This is a 72 y.o. female with no significant pmhx presented to ER on 4/29/2022 with status post fall landing on her right hip and right wrist.      Imaging X-rays done showed right valgus impacted femoral neck fracture and minimally displaced distal radius metaphyseal fracture.  Orthopedics was consulted, patient underwent  Open treatment with internal fixation of right femoral neck fracture; Closed treatment without manipulation, right extra-articular distal radius  Fracture on 4/29. Per ortho,TTWB RLE and NWB R wrist, okay to WB thru elbow with platform walker  PT and OT pending      Interval events:  --No acute events overnight, vital sign has been stable, patient is requiring 2 to 3 L of oxygen.  Patient underwent surgery yesterday by Dr. Zuniga.  Dr. Zuniga recommended toe-touch weightbearing the right lower extremity; nonweightbearing right chest.  --PT/OT pending.  -Patient admitted to have macrocytosis, will obtain vitamin B12, TSH.    --Patient noted to have some-pancytopenia with a platelet of 69.  Repeat CBC pending.  Monitor for active bleeding.    I have personally seen and examined the patient at bedside. I discussed the plan of care with patient, bedside RN, charge RN and .    Consultants/Specialty  orthopedics    Code Status  Full Code    Disposition  Patient is not medically cleared for discharge.   Anticipate discharge to to skilled nursing facility.  I have placed the appropriate orders for post-discharge needs.    Review of Systems  Review of Systems   Constitutional: Positive for malaise/fatigue.   HENT:  Negative for congestion and sore throat.    Respiratory: Negative for cough, hemoptysis, sputum production and shortness of breath.    Cardiovascular: Negative for chest pain, palpitations, orthopnea and claudication.   Gastrointestinal: Negative for abdominal pain, heartburn, nausea and vomiting.   Musculoskeletal: Positive for joint pain and myalgias.   Neurological: Negative for focal weakness and headaches.   Psychiatric/Behavioral: Negative for depression. The patient is not nervous/anxious.    All other systems reviewed and are negative.       Physical Exam  Temp:  [35.9 °C (96.7 °F)-37.1 °C (98.8 °F)] 36.7 °C (98.1 °F)  Pulse:  [48-88] 54  Resp:  [15-22] 16  BP: (107-193)/(54-81) 113/55  SpO2:  [92 %-100 %] 100 %    Physical Exam  Vitals and nursing note reviewed.   Constitutional:       Appearance: Normal appearance.   HENT:      Head: Normocephalic and atraumatic.   Eyes:      Extraocular Movements: Extraocular movements intact.   Cardiovascular:      Rate and Rhythm: Normal rate.      Pulses: Normal pulses.   Pulmonary:      Effort: No respiratory distress.      Breath sounds: Normal breath sounds.   Abdominal:      General: Bowel sounds are normal.      Tenderness: There is no abdominal tenderness.   Musculoskeletal:      Cervical back: Neck supple.      Left lower leg: No edema.      Comments: Decreased ROM on Right side likely 2/2 pain   Does have surgical dressing on the Right Forearm   Skin:     General: Skin is warm.   Neurological:      Mental Status: She is alert and oriented to person, place, and time.      Cranial Nerves: No cranial nerve deficit.         Fluids    Intake/Output Summary (Last 24 hours) at 4/30/2022 1102  Last data filed at 4/29/2022 2209  Gross per 24 hour   Intake 1250 ml   Output 300 ml   Net 950 ml       Laboratory  Recent Labs     04/29/22  1943   WBC 8.5   RBC 3.92*   HEMOGLOBIN 12.7   HEMATOCRIT 39.1   MCV 99.7*   MCH 32.4   MCHC 32.5*   RDW 51.0*   PLATELETCT 69*   MPV  10.0     Recent Labs     04/29/22 1943   SODIUM 139   POTASSIUM 4.0   CHLORIDE 105   CO2 20   GLUCOSE 112*   BUN 21   CREATININE 0.73   CALCIUM 9.3     Recent Labs     04/29/22 1943   APTT 26.6   INR 1.03               Imaging  DX-HIP-COMPLETE - UNILATERAL 2+ RIGHT   Final Result      Digitized intraoperative radiograph is submitted for review.  This examination is not for diagnostic purpose but for guidance during a surgical procedure.      DX-KNEE 2- RIGHT   Final Result      1.  There is no acute fracture or malalignment of the right knee.      DX-FEMUR-2+ RIGHT   Final Result      Impacted transcervical right femoral neck fracture.      DX-PELVIS-1 OR 2 VIEWS   Final Result      1.  Impacted transcervical right femoral neck fracture.      DX-CHEST-PORTABLE (1 VIEW)   Final Result      No acute cardiac or pulmonary abnormalities are identified.      DX-WRIST-COMPLETE 3+ RIGHT   Final Result      Impacted, comminuted intra-articular distal radius fracture.      DX-PORTABLE FLUORO > 1 HOUR    (Results Pending)        Assessment/Plan  * Fracture of femoral neck, right, closed (HCC)  Assessment & Plan  Imaging X-rays done showed right valgus impacted femoral neck fracture and minimally displaced distal radius metaphyseal fracture.   --  Orthopedics was consulted, patient underwent  Open treatment with internal fixation of right femoral neck fracture; Closed treatment without manipulation, right extra-articular distal radius   -- PT and OT   -- DVt ppx      Thrombocytopenia (HCC)  Assessment & Plan  At 71  Not actively bleeding   monitpr    Macrocytosis  Assessment & Plan  Obtain TSH/ Vitamin b12    Obesity (BMI 30-39.9)  Assessment & Plan   on dietary and lifestyle modification once mental status improves     Closed fracture of distal end of right radius  Assessment & Plan  Non op. Ortho on board   PT/OT  Pain control        VTE prophylaxis: Lovenox

## 2022-04-30 NOTE — CARE PLAN
The patient is Watcher - Medium risk of patient condition declining or worsening    Shift Goals  Clinical Goals: monitor vitals  Patient Goals: not able to state  Family Goals: comfort    Progress made toward(s) clinical / shift goals:  yes      Problem: Knowledge Deficit - Standard  Goal: Patient and family/care givers will demonstrate understanding of plan of care, disease process/condition, diagnostic tests and medications  4/30/2022 0734 by Winifred Buitrago, R.N.  Outcome: Progressing  4/30/2022 0734 by Winifred Buitrago R.N.  Outcome: Progressing     Problem: Fall Risk  Goal: Patient will remain free from falls  4/30/2022 0734 by Winifred Buitrago, R.N.  Outcome: Progressing  4/30/2022 0734 by Winifred Buitrago, R.N.  Outcome: Progressing   Bed alarm, hourly rounding in place. Patient educated to call for assistance prior to getting up, however patient is drowsy from surgery and reinforcement is needed.    Problem: Mobility  Goal: Patient's capacity to carry out activities will improve  Outcome: Progressing   Unable to mobilize because of unstable condition and drowsiness.

## 2022-04-30 NOTE — ANESTHESIA PREPROCEDURE EVALUATION
Case: [id number] Date/Time: 04/29/22 2000    Procedure: ORIF, HIP    Pre-op diagnosis: right vaglus impacted femoral neck fracture     Location: [facility] OR 16 / SURGERY [facility]    Surgeons: Cassius Zuniga M.D.          Relevant Problems   No relevant active problems       Physical Exam    Airway   Mallampati: II  TM distance: >3 FB  Neck ROM: full       Cardiovascular - normal exam  Rhythm: regular  Rate: normal  (-) murmur     Dental - normal exam           Pulmonary - normal exam  Breath sounds clear to auscultation     Abdominal    Neurological - normal exam                 Anesthesia Plan    ASA 2       Plan - general and peripheral nerve block     Peripheral nerve block will be post-op pain control  Airway plan will be ETT          Induction: intravenous    Postoperative Plan: Postoperative administration of opioids is intended.    Pertinent diagnostic labs and testing reviewed    Informed Consent:    Anesthetic plan and risks discussed with patient.    Use of blood products discussed with: patient whom consented to blood products.

## 2022-04-30 NOTE — OR SURGEON
Immediate Post OP Note    PreOp Diagnosis: Right femoral neck fracture, right distal radius fracture      PostOp Diagnosis: same      Procedure(s):  ORIF, HIP, SPLINTING RIGHT WRIST - Wound Class: Clean    Surgeon(s):  Cassius Zuniga M.D.    Anesthesiologist/Type of Anesthesia:  Anesthesiologist: Troy Porter M.D./General    Surgical Staff:  Circulator: Shola Whalen R.N.; Ness Bettencourt R.N.  Scrub Person: Miguel Mae    Specimens removed if any:  * No specimens in log *    Estimated Blood Loss: 300cc    Findings: see dictation    Complications: none known    PLAN:  --readmit medicine postop  --TTWB RLE  --NWB R wrist, okay to WB thru elbow with platform walker  --ancef x 2 doses postop  --PT/OT for mobilization ASAP  --okay to start lovenox or equivalent tomorrow am if otherwise clinically appropriate, continue SCDs        4/29/2022 10:31 PM Cassius Zuniga M.D.

## 2022-04-30 NOTE — ANESTHESIA PROCEDURE NOTES
Airway    Date/Time: 4/29/2022 8:54 PM  Performed by: Troy Porter M.D.  Authorized by: Troy Porter M.D.     Location:  OR  Urgency:  Elective  Indications for Airway Management:  Anesthesia      Spontaneous Ventilation: absent    Sedation Level:  Deep  Preoxygenated: Yes    Patient Position:  Sniffing  Final Airway Type:  Endotracheal airway  Final Endotracheal Airway:  ETT  Cuffed: Yes    Technique Used for Successful ETT Placement:  Direct laryngoscopy    Insertion Site:  Oral  Blade Type:  Hughes  Laryngoscope Blade/Videolaryngoscope Blade Size:  2  ETT Size (mm):  7.5  Measured from:  Teeth  ETT to Teeth (cm):  21  Placement Verified by: auscultation and capnometry    Cormack-Lehane Classification:  Grade IIa - partial view of glottis  Number of Attempts at Approach:  1  Ventilation Between Attempts:  None  Number of Other Approaches Attempted:  0

## 2022-04-30 NOTE — CONSULTS
DATE OF SERVICE:  04/29/2022     ORTHOPEDIC CONSULTATION     REQUESTING PHYSICIAN:  Ahmet Llanos MD, emergency department.     REASON FOR CONSULTATION:  Right femoral neck fracture, right distal radius   fracture.     CHIEF COMPLAINT:  Right wrist and right hip pain.     HISTORY OF PRESENT ILLNESS:  The patient is a 72-year-old female.  She fell on   her right side and is complaining mostly of right wrist and hip pain.  She   does have some preexisting pain in both of her knees related to arthritis.    She states her back hurts right now, has been lying in same position for a   while.  Her  is with her at bedside in the emergency department.  She   is being evaluated for admission to the hospitalist service.  I was consulted   to provide treatment recommendations for her fractures.     PAST MEDICAL HISTORY:  ALLERGIES:  No known drug allergies.     OUTPATIENT MEDICATIONS:  Multivitamins, vitamin D, other enzyme supplements.     PAST MEDICAL DIAGNOSES:  She denies having any.     PAST SURGICAL HISTORY:  None listed.     SOCIAL HISTORY:  The patient is a nonsmoker. Denies alcohol.  Denies illicit   drug use.     PHYSICAL EXAMINATION:  VITAL SIGNS:  Temperature is 98.1, heart rate 67, respiratory rate 18, blood   pressure 143/67, pulse oximetry 98% on room air.  GENERAL APPEARANCE:  The patient is alert and oriented, pleasant, cooperative,   in no acute distress.  MUSCULOSKELETAL:  Right lower extremity, she has no significant rotational or   length discrepancies.  She is able to dorsi and plantarflex her feet and flex   and extend her toes.  She is nontender to palpation of the right knee.  There   are no wounds present in the right thigh.  In the right distal forearm, she   has mild swelling.  She is tender to palpation of the distal radial   metaphyseal area.  She is able to flex and extend all of her fingers including   the thumb.  She has a palpable radial pulse.  She is nontender over the   shoulder  and the elbow.  Left upper extremity is grossly neurovascularly   intact without evidence of obvious traumatic deformity.     DIAGNOSTIC IMAGING:  Plain x-rays, AP pelvis and two views right femur shows   right valgus impacted, but complete femoral neck fracture.  She has a right   minimally displaced distal radial metaphyseal fracture.     ASSESSMENT:  A 72-year-old female with a right valgus impacted, but complete   femoral neck fracture and right distal radial metaphyseal fracture which is   minimally displaced.  She is being evaluated for admission to the hospitalist   service.     RECOMMENDATIONS:  1.  I discussed these findings with the patient and her  and I do   recommend surgical stabilization of her right femoral neck fracture to allow   for early mobilization and minimize the risk of fracture, collapse and   displacement, which would potentially require arthroplasty.  I recommend   nonoperative management for her right distal radius fracture with splint   immobilization for now.  2.  We did discuss potential risks of surgery including infection, potential   for injury to neurovascular structures, DVT, pulmonary embolism, bleeding,   loss of fixation, potential need for further surgery such as hip arthroplasty   and general risks of anesthesia.  She expressed understanding and wished to   proceed with surgery when possible.  3.  The patient is n.p.o., so make preparations to go to the operating room   for surgical fixation of right femoral neck fracture and splinting to the   right wrist if they are unable to get to it in the emergency department prior   to surgical management.        ______________________________  MD DOROTA Sanchez/LOIS/NIT    DD:  04/29/2022 20:24  DT:  04/29/2022 20:47    Job#:  [id number]

## 2022-04-30 NOTE — ED NOTES
Med rec completed per patient and spouse at bedside.  Allergies reviewed with patient. NKDA.  Patient denies using any prescription medications.  No outpatient antibiotics in the last 30 days.  [facility]

## 2022-04-30 NOTE — PROGRESS NOTES
72F Hx osteoporosis s/p GLF this afternoon after confrontation with dog  No Rx, NKDA, last PO 12 noon  ROS c/o R wrist pain, R hip/groin pain, R knee pain  XR: nondisplaced R femoral neck fx, impacted comminuted R intraarticluar wrist fx  Denies L sided pain, endorses cramps L leg    A/ R femoral neck fx  R distal radius fx    P/ covid swab  Formal consult to follow  hospitalist to admit  NPO  Ice, analgesia - nonnarcotic preferred until consented  To OR for surgical treatment  D/W Dr. Zuniga

## 2022-04-30 NOTE — THERAPY
"Physical Therapy   Initial Evaluation     Patient Name: Carmita Miller  Age:  72 y.o., Sex:  female  Medical Record #: [id number]  Today's Date: 4/30/2022     Precautions  Precautions: (P) Fall Risk;Toe Touch Weight Bearing Right Lower Extremity;Platform Weight Bearing Right Upper Extremity (NWB R wrist/hand)    Assessment    Patient is 72 y.o. female with admitting diagnosis of s/p R femoral neck ORIF, wrist fracture following GLF, NWB R wrist/hand, TTWB R LE. Prior to admission pt lived in story home with  and two large dogs, 2 steps to enter without railing, independent with self care/ADLs and mobility, ambulation without AD.    Pt demonstrates supine to sit EOB with use of railing, verbal cues, and increased effort, SBA. Sit to stand from EOB with mod assist and verbal cues for sequencing and to maintain NWB R wrist/hand and TTWB R LE. Mod assist for stand pivot transfer to chair with several small slide steps to side. Pt is agreeable to remain sitting in recliner chair. Pt will benefit from continued PT services in acute setting, as well as in post acute setting. Pt demonstrates understanding of POC/DC requirements.        Plan    Recommend Physical Therapy 4 times per week until therapy goals are met for the following treatments:  Bed Mobility, Equipment, Gait Training, Neuro Re-Education / Balance, Stair Training, Therapeutic Activities, and Therapeutic Exercises    DC Equipment Recommendations: (P) Unable to determine at this time  Discharge Recommendations: (P) Recommend post-acute placement for additional physical therapy services prior to discharge home       Subjective    \"I'd like to try walking\"       04/30/22 1116   Initial Contact Note    Initial Contact Note Order Received and Verified, Physical Therapy Evaluation in Progress with Full Report to Follow.   Precautions   Precautions Fall Risk;Toe Touch Weight Bearing Right Lower Extremity;Platform Weight Bearing Right Upper Extremity  (NWB R " wrist/hand)   Vitals   Room Air Oximetry 90   O2 Delivery Device None - Room Air   Pain 0 - 10 Group   Location Hip;Arm   Location Orientation Right   Pain Rating Scale (NPRS) 5   Therapist Pain Assessment During Activity   Prior Living Situation   Prior Services Home-Independent   Housing / Facility 2 Story House   Steps Into Home 2   Steps In Home 12   Bathroom Set up Walk In Shower   Equipment Owned Front-Wheel Walker;Single Point Cane;Crutches   Lives with - Patient's Self Care Capacity Spouse  (2 large dogs)   Prior Level of Functional Mobility   Bed Mobility Independent   Transfer Status Independent   Ambulation Independent   Assistive Devices Used None   Stairs Independent   History of Falls   History of Falls Yes   Date of Last Fall 04/30/22   Cognition    Cognition / Consciousness WDL   Level of Consciousness Alert   Comments pleasant and cooperative   Strength Lower Body   Lower Body Strength  X   Comments R LE limited due to pain/edema   Strength Upper Body   Upper Body Strength  X   Comments R UE limited due to pain and edema   Balance Assessment   Sitting Balance (Static) Fair   Sitting Balance (Dynamic) Fair   Standing Balance (Static) Poor   Standing Balance (Dynamic) Trace +   Weight Shift Sitting Fair   Weight Shift Standing Absent   Gait Analysis   Gait Level Of Assist Unable to Participate   Assistive Device Front Wheel Walker   Bed Mobility    Supine to Sit Standby Assist   Scooting Standby Assist   Functional Mobility   Sit to Stand Moderate Assist   Bed, Chair, Wheelchair Transfer Moderate Assist   Transfer Method Stand Step  (sliding LE)   Mobility supine to sit EOB, sit to stand, stand step/slide step to chair   Comments with FWW   How much difficulty does the patient currently have...   Turning over in bed (including adjusting bedclothes, sheets and blankets)? 1   Sitting down on and standing up from a chair with arms (e.g., wheelchair, bedside commode, etc.) 1   Moving from lying on back to  sitting on the side of the bed? 1   How much help from another person does the patient currently need...   Moving to and from a bed to a chair (including a wheelchair)? 2   Need to walk in a hospital room? 1   Climbing 3-5 steps with a railing? 1   6 clicks Mobility Score 7   Activity Tolerance   Sitting in Chair 5 min + left in chair   Sitting Edge of Bed 10 min   Standing 3 min   Patient / Family Goals    Patient / Family Goal #1 get strong enough to go home   Short Term Goals    Short Term Goal # 1 supine to/from sit EOB without use of railing, HOB flat with SBA in 6 visits   Short Term Goal # 2 sit to stand from EOB to FWW while maintaining WB precautions, SBA in 6 visits   Short Term Goal # 3 ambulation with FWW and CGA maintaining WB precautions, 30 feet in 6 visits   Short Term Goal # 4 up/down 2 steps with use of rail and min assist while maintaining WB precautions in 6 visits   Education Group   Education Provided Role of Physical Therapist;Weight Bearing Status;Transfer Status   Role of Physical Therapist Patient Response Patient;Acceptance;Explanation;Verbal Demonstration   Transfer Status Patient Response Patient;Acceptance;Demonstration;Explanation;Verbal Demonstration;Action Demonstration;Reinforcement Needed   Weight Bearing Status Patient Response Patient;Acceptance;Explanation;Verbal Demonstration;Action Demonstration;Reinforcement Needed   Problem List    Problems Pain;Impaired Bed Mobility;Impaired Transfers;Impaired Ambulation;Functional Strength Deficit;Functional ROM Deficit;Impaired Balance;Decreased Activity Tolerance   Anticipated Discharge Equipment and Recommendations   DC Equipment Recommendations Unable to determine at this time   Discharge Recommendations Recommend post-acute placement for additional physical therapy services prior to discharge home     Elina Varela DPT

## 2022-04-30 NOTE — ANESTHESIA PROCEDURE NOTES
Peripheral Block    Date/Time: 4/29/2022 9:00 PM  Performed by: Troy Porter M.D.  Authorized by: Troy Porter M.D.     Start Time:  4/29/2022 9:00 PM  End Time:  4/29/2022 9:02 PM  Reason for Block: at surgeon's request and post-op pain management ONLY    patient identified, IV checked, site marked, risks and benefits discussed, surgical consent, monitors and equipment checked, pre-op evaluation and timeout performed    Patient Position:  Supine  Prep: ChloraPrep    Monitoring:  Heart rate, continuous pulse ox and cardiac monitor  Block Region:  Lower Extremity  Lower Extremity - Block Type:  FEMORAL nerve block, Supra-Inguinal Fascia Iliaca approach    Laterality:  Right  Procedures: ultrasound guided  Image captured, interpreted and electronically stored.  Local Infiltration:  Lidocaine  Strength:  1 %  Dose:  3 ml  Block Type:  Single-shot  Needle Length:  100mm  Needle Gauge:  21 G  Needle Localization:  Ultrasound guidance  Injection Assessment:  Negative aspiration for heme, no paresthesia on injection, incremental injection and local visualized surrounding nerve on ultrasound  Evidence of intravascular injection: No     Suprainguinal Fascia Iliaca Block   With the patient supine, the US transducer was placed perpendicular to the ASIS of the operative side. The ASIS was identified at a point where the internal oblique, transversus abdominis and psoas major are stacked medial to the iliacus muscle. The plane in between was the fascia iliaca. A nerve block needle was advanced into the fascia iliaca and local anesthetic was injected deep/lateral to the fascia iliaca, just above the iliacus muscle.

## 2022-04-30 NOTE — ANESTHESIA TIME REPORT
Anesthesia Start and Stop Event Times     Date Time Event    4/29/2022 2042 Ready for Procedure     2047 Anesthesia Start     2233 Anesthesia Stop        Responsible Staff  04/29/22    Name Role Begin End    Troy Porter M.D. Anesth 2047 2233        Overtime Reason:  no overtime (within assigned shift)    Comments:

## 2022-04-30 NOTE — THERAPY
"Occupational Therapy   Initial Evaluation     Patient Name: Carmita Miller  Age:  72 y.o., Sex:  female  Medical Record #: [id number]  Today's Date: 4/30/2022     Precautions: Fall Risk,Toe Touch Weight Bearing Right Lower Extremity,Platform Weight Bearing Right Upper Extremity (NWB R wrist/hand)    Assessment  Patient is 72 y.o. female admitted after her dog caused her to fall. Pt is dx w/R femoral neck fx s/p ORIF and TTWB as well as closed distal end of R radius non-op management and platform WB. Today pt was limited by WB status and decreased activity tolerance, pt has some environmental barriers w/stairs in her home, at this time recommend post acute placement however pt has the potential to progress to home w/family assist w/continue to assess.     Plan  Recommend Occupational Therapy 4 times per week until therapy goals are met for the following treatments:  Adaptive Equipment, Self Care/Activities of Daily Living, Therapeutic Activities and Therapeutic Exercises.    DC Equipment Recommendations: Unable to determine at this time  Discharge Recommendations: Recommend post-acute placement for additional occupational therapy services prior to discharge home (has the potential to progress at home)     Subjective  \" I want to be able to just go home\"      Objective     04/30/22 1112   Total Time Spent   Total Time Spent (Mins) 25   Charge Group   OT Evaluation OT Evaluation Low   Initial Contact Note    Initial Contact Note Order Received and Verified, Occupational Therapy Evaluation in Progress with Full Report to Follow.   Prior Living Situation   Prior Services Home-Independent   Housing / Facility 2 Story House   Steps Into Home 2   Steps In Home 12   Bathroom Set up Walk In Shower   Equipment Owned Front-Wheel Walker;Single Point Cane;Crutches   Lives with - Patient's Self Care Capacity Spouse   Comments 2 large dogs   Prior Level of ADL Function   Self Feeding Independent   Grooming / Hygiene Independent "   Bathing Independent   Dressing Independent   Toileting Independent   Prior Level of IADL Function   Medication Management Independent   Laundry Independent   Kitchen Mobility Independent   Finances Independent   Home Management Independent   Shopping Independent   Prior Level Of Mobility Independent Without Device in Community   History of Falls   History of Falls Yes   Date of Last Fall 04/30/22   Precautions   Precautions Fall Risk;Toe Touch Weight Bearing Right Lower Extremity;Platform Weight Bearing Right Upper Extremity   Pain 0 - 10 Group   Location Hip;Arm   Location Orientation Right   Therapist Pain Assessment Prior to Activity;During Activity;Nurse Notified   Cognition    Cognition / Consciousness WDL   Level of Consciousness Alert   Comments pleasant and cooperative   Passive ROM Upper Body   Passive ROM Upper Body WDL   Active ROM Upper Body   Active ROM Upper Body  WDL   Strength Upper Body   Upper Body Strength  X   Comments R UE limited due to pain and edema   Sensation Upper Body   Upper Extremity Sensation  Not Tested   Upper Body Muscle Tone   Upper Body Muscle Tone  WDL   Coordination Upper Body   Coordination X   Comments mild limitation w/distal wrist fx   Balance Assessment   Sitting Balance (Static) Fair   Sitting Balance (Dynamic) Fair   Standing Balance (Static) Poor +   Standing Balance (Dynamic) Trace +   Weight Shift Sitting Fair   Weight Shift Standing Absent   Comments w/platform fww   Bed Mobility    Supine to Sit Contact Guard Assist   ADL Assessment   Grooming Contact Guard Assist   Upper Body Dressing Minimal Assist   Lower Body Dressing Moderate Assist   Toileting   (NT)   Comments limited by WB status   How much help from another person does the patient currently need...   6 Clicks Daily Activity Score 19   Functional Mobility   Sit to Stand Moderate Assist   Bed, Chair, Wheelchair Transfer Moderate Assist   Mobility EOB>chair   Edema / Skin Assessment   Edema / Skin  Not  Assessed   Activity Tolerance   Comments c/o pain and fatigue   Patient / Family Goals   Patient / Family Goal #1 to go home   Short Term Goals   Short Term Goal # 1 pt will complete grooming seated w/set up   Short Term Goal # 2 pt will complete FB Dressing w/spv   Short Term Goal # 3 pt will complete toilet txf w/spv   Education Group   Role of Occupational Therapist Patient Response Patient;Acceptance;Explanation;Demonstration   Problem List   Problem List Decreased Active Daily Living Skills;Decreased Upper Extremity Strength Right;Decreased Functional Mobility;Decreased Activity Tolerance;Impaired Postural Control / Balance

## 2022-04-30 NOTE — ED NOTES
Report given to ERIC Rosales in preop. Surgeon at bedside. PT EKG being done. Then PT will be transported via gurney to Preop.

## 2022-04-30 NOTE — PROGRESS NOTES
72yoF with right vaglus impacted femoral neck fracture s/p ORIF 4/30 and minimally displaced distal radius metaphyseal fx treating nonoperatively.     S: Doing okay, no complaints this am    O:    Vitals:    04/30/22 0714   BP: 113/55   Pulse: (!) 54   Resp: 16   Temp: 36.7 °C (98.1 °F)   SpO2: 100%     Exam:  General-NAD, alert and following commands  RLE-hip dressing c/d/i, NVI distally  RUE-splint c/d/i, flexing/extending fingers, BCR and SILT in fingers    Hct: 39.1    A: 72yoF with right vaglus impacted femoral neck fracture s/p ORIF 4/30 and minimally displaced distal radius metaphyseal fx treating nonoperatively.     Recs:  --TTWB RLE  --NWB R wrist, okay to WB thru elbow with platform walker  --ancef x 2 doses postop  --PT/OT for mobilization ASAP  --okay to start lovenox or equivalent this am if otherwise clinically appropriate, continue SCDs  --fu 2 weeks postop

## 2022-04-30 NOTE — H&P
Hospital Medicine History & Physical Note    Date of Service  4/29/2022    Primary Care Physician  Pcp Not In Computer    Consultants  orthopedics    Specialist Names: Dr. Zuniga     Code Status  Full Code    Chief Complaint  Chief Complaint   Patient presents with   • T-5000 GLF     Pt fell onto her R side after a dog caused her to fall onto her R wrist and R hip. Pt has R hip and R wrist pain. No obvious deformities, CMS intact       History of Presenting Illness  Carmita Miller is a 72 y.o. female with no significant past medical history who presented 4/29/2022 with status post fall landing on her right hip and right wrist.  Patient was seen postoperatively.  History is obtained from chart review as patient is currently waking up from anesthesia.  X-rays done showed right valgus impacted femoral neck fracture and minimally displaced distal radius metaphyseal fracture.  Orthopedics was consulted, patient was taken to the OR for surgical stabilization of her right femoral neck fracture.    I discussed the plan of care with bedside RN.    Review of Systems  Review of Systems   Unable to perform ROS: Mental acuity   Musculoskeletal: Positive for falls.       Past Medical History   has a past medical history of Patient denies medical problems.    Surgical History  Reviewed and not pertinent     Family History  Reviewed   Family history reviewed with patient. There is no family history that is pertinent to the chief complaint.     Social History   reports that she has never smoked. She has never used smokeless tobacco. She reports that she does not drink alcohol and does not use drugs.    Allergies  No Known Allergies    Medications  Prior to Admission Medications   Prescriptions Last Dose Informant Patient Reported? Taking?   Alpha-D-Galactosidase (GAS ENZYME SUPPLEMENT PO) 4/29/2022 at 1300 Patient Yes Yes   Sig: Take 1 Capsule by mouth 1 time a day as needed (Gas).   Multiple Vitamins-Minerals (MULTIVITAMIN WOMEN  50+) Tab 4/29/2022 at 0800 Patient Yes Yes   Sig: Take 1 Tablet by mouth every morning.   vitamin D3 (CHOLECALCIFEROL) 1000 Unit (25 mcg) Tab 4/29/2022 at 0800 Patient Yes Yes   Sig: Take 1,000 Units by mouth every morning.      Facility-Administered Medications: None       Physical Exam  Temp:  [36.3 °C (97.3 °F)-37.1 °C (98.8 °F)] 36.3 °C (97.3 °F)  Pulse:  [53-88] 53  Resp:  [16-22] 19  BP: (134-193)/(60-81) 152/69  SpO2:  [92 %-98 %] 98 %  Blood Pressure : 152/69   Temperature: 36.3 °C (97.3 °F)   Pulse: (!) 53   Respiration: 19   Pulse Oximetry: 98 %       Physical Exam  Vitals and nursing note reviewed.   Constitutional:       General: She is not in acute distress.     Appearance: Normal appearance. She is not ill-appearing.      Comments: Somnolent waking up from anesthesia    HENT:      Head: Normocephalic and atraumatic.      Right Ear: Tympanic membrane normal.      Left Ear: Tympanic membrane normal.      Nose: Nose normal.      Mouth/Throat:      Mouth: Mucous membranes are moist.      Pharynx: Oropharynx is clear.   Eyes:      Extraocular Movements: Extraocular movements intact.      Pupils: Pupils are equal, round, and reactive to light.   Cardiovascular:      Rate and Rhythm: Normal rate and regular rhythm.      Pulses: Normal pulses.      Heart sounds: Normal heart sounds.   Pulmonary:      Effort: Pulmonary effort is normal. No respiratory distress.      Breath sounds: Normal breath sounds. No stridor. No rhonchi.   Abdominal:      General: Bowel sounds are normal. There is no distension.      Palpations: Abdomen is soft. There is no mass.      Tenderness: There is no abdominal tenderness.      Hernia: No hernia is present.   Musculoskeletal:         General: Tenderness and deformity present.      Cervical back: Neck supple. No rigidity or tenderness.   Skin:     General: Skin is warm.      Capillary Refill: Capillary refill takes less than 2 seconds.      Coloration: Skin is not jaundiced or pale.       Findings: No bruising or erythema.   Neurological:      Mental Status: Mental status is at baseline.      Cranial Nerves: No cranial nerve deficit.      Sensory: No sensory deficit.      Motor: No weakness.      Coordination: Coordination normal.   Psychiatric:         Mood and Affect: Mood normal.         Behavior: Behavior normal.         Laboratory:  Recent Labs     04/29/22 1943   WBC 8.5   RBC 3.92*   HEMOGLOBIN 12.7   HEMATOCRIT 39.1   MCV 99.7*   MCH 32.4   MCHC 32.5*   RDW 51.0*   PLATELETCT 69*   MPV 10.0     Recent Labs     04/29/22 1943   SODIUM 139   POTASSIUM 4.0   CHLORIDE 105   CO2 20   GLUCOSE 112*   BUN 21   CREATININE 0.73   CALCIUM 9.3     Recent Labs     04/29/22 1943   ALTSGPT 32   ASTSGOT 27   ALKPHOSPHAT 76   TBILIRUBIN 0.3   GLUCOSE 112*     Recent Labs     04/29/22 1943   APTT 26.6   INR 1.03     No results for input(s): NTPROBNP in the last 72 hours.      No results for input(s): TROPONINT in the last 72 hours.    Imaging:  DX-HIP-COMPLETE - UNILATERAL 2+ RIGHT   Final Result      Digitized intraoperative radiograph is submitted for review.  This examination is not for diagnostic purpose but for guidance during a surgical procedure.      DX-KNEE 2- RIGHT   Final Result      1.  There is no acute fracture or malalignment of the right knee.      DX-FEMUR-2+ RIGHT   Final Result      Impacted transcervical right femoral neck fracture.      DX-PELVIS-1 OR 2 VIEWS   Final Result      1.  Impacted transcervical right femoral neck fracture.      DX-CHEST-PORTABLE (1 VIEW)   Final Result      No acute cardiac or pulmonary abnormalities are identified.      DX-WRIST-COMPLETE 3+ RIGHT   Final Result      Impacted, comminuted intra-articular distal radius fracture.      DX-PORTABLE FLUORO > 1 HOUR    (Results Pending)       X-Ray:  I have personally reviewed the images and compared with prior images.  EKG:  I have personally reviewed the images and compared with prior  images.    Assessment/Plan:  Justification for Admission Status  I anticipate this patient will require at least two midnights for appropriate medical management, necessitating inpatient admission because impacted right femoral neck fracture and comminuted intra-articular distal radius fracture    * Fracture of femoral neck, right, closed (HCC)  Assessment & Plan  S/p surgical stabilization   PT/OT  Early mobility   Pain control     Obesity (BMI 30-39.9)  Assessment & Plan   on dietary and lifestyle modification once mental status improves     Closed fracture of distal end of right radius  Assessment & Plan  Non op. Ortho on board   PT/OT  Pain control       VTE prophylaxis: enoxaparin ppx

## 2022-04-30 NOTE — ED PROVIDER NOTES
ED Provider Note    Scribed for Ahmet Llanos M.D. by Judson Parra. 4/29/2022, 5:42 PM.    Primary care provider: Pcp Not In Computer  Means of arrival: EMS  History obtained from: Patient  History limited by: None    CHIEF COMPLAINT  Chief Complaint   Patient presents with   • T-5000 GLF     Pt fell onto her R side after a dog caused her to fall onto her R wrist and R hip. Pt has R hip and R wrist pain. No obvious deformities, CMS intact       HPI  Carmita Miller is a 72 y.o. female who presents to the Emergency Department for evaluation of a ground level fall. Onset prior to arrival. She states that she was charged at by a family members dog, causing her to fall. She landed on her right wrist and hip. She has associated symptoms of right wrist and hip pain. She denies any neck pain, left leg pain, left hip pain, abdominal pain, chest pain, or head injuries. She denies any major medical history. However, she notes that she takes percocet. There are no known alleviating or exacerbating factors.     REVIEW OF SYSTEMS  Review of Systems   Cardiovascular: Negative for chest pain.   Gastrointestinal: Negative for abdominal pain.   Musculoskeletal: Positive for joint pain (right wrist and hip). Negative for neck pain.        Negative for head injuries.  Negative for left leg or hip pain.   All other systems reviewed and are negative.      PAST MEDICAL HISTORY   has a past medical history of Patient denies medical problems.    SURGICAL HISTORY  patient denies any surgical history    SOCIAL HISTORY  Social History     Tobacco Use   • Smoking status: Never Smoker   • Smokeless tobacco: Never Used   Substance Use Topics   • Alcohol use: No   • Drug use: No      Social History     Substance and Sexual Activity   Drug Use No       FAMILY HISTORY  History reviewed. No pertinent family history.    CURRENT MEDICATIONS  Home Medications     Reviewed by Guadalupe Clark R.N. (Registered Nurse) on 04/29/22 at 7797  Med List  "Status: Complete   Medication Last Dose Status   oxycodone-acetaminophen (PERCOCET) 5-325 MG Tab  Active                ALLERGIES  No Known Allergies    PHYSICAL EXAM  VITAL SIGNS: BP (!) 193/81   Pulse 88   Temp 36.7 °C (98.1 °F) (Temporal)   Resp 16   Ht 1.676 m (5' 6\")   Wt 88.5 kg (195 lb)   SpO2 98%   BMI 31.47 kg/m²   Vitals reviewed.  Constitutional: Well developed, Well nourished, No acute distress, Non-toxic appearance.   HENT: Normocephalic, Atraumatic, Bilateral external ears normal, Oropharynx moist, No oral exudates, Nose normal.   Eyes: PERRL, EOMI, Conjunctiva normal, No discharge.   Neck: Normal range of motion, No tenderness, Supple, No stridor.  Neck is clinically clearable.  Cardiovascular: Normal heart rate, Normal rhythm, No murmurs, No rubs, No gallops.   Thorax & Lungs: Normal breath sounds, No respiratory distress, No wheezing, No chest tenderness.   Abdomen: Bowel sounds normal, Soft, No tenderness  Skin: Warm, Dry, No erythema, No rash.   Back: No tenderness, No CVA tenderness.   Musculoskeletal: Right wrist is diffusely tender mildly swollen normal neurovascular Griffin no significant displacement.  Left upper extremities unremarkable.  Remainder of her right upper extremity is unremarkable.  Left lower extremity unremarkable.  Right lower extremities pain with range of motion the hip.  Normal neurovascular examination.  Neurologic: Alert, Normal motor function, Normal sensory function, No focal deficits noted.   Psychiatric: Affect normal    LABS  Results for orders placed or performed during the hospital encounter of 04/29/22   CBC WITH DIFFERENTIAL   Result Value Ref Range    WBC 8.5 4.8 - 10.8 K/uL    RBC 3.92 (L) 4.20 - 5.40 M/uL    Hemoglobin 12.7 12.0 - 16.0 g/dL    Hematocrit 39.1 37.0 - 47.0 %    MCV 99.7 (H) 81.4 - 97.8 fL    MCH 32.4 27.0 - 33.0 pg    MCHC 32.5 (L) 33.6 - 35.0 g/dL    RDW 51.0 (H) 35.9 - 50.0 fL    Platelet Count 69 (L) 164 - 446 K/uL    MPV 10.0 9.0 - 12.9 " fL    Neutrophils-Polys 77.70 (H) 44.00 - 72.00 %    Lymphocytes 14.20 (L) 22.00 - 41.00 %    Monocytes 7.20 0.00 - 13.40 %    Eosinophils 0.10 0.00 - 6.90 %    Basophils 0.10 0.00 - 1.80 %    Immature Granulocytes 0.70 0.00 - 0.90 %    Nucleated RBC 0.00 /100 WBC    Neutrophils (Absolute) 6.59 2.00 - 7.15 K/uL    Lymphs (Absolute) 1.20 1.00 - 4.80 K/uL    Monos (Absolute) 0.61 0.00 - 0.85 K/uL    Eos (Absolute) 0.01 0.00 - 0.51 K/uL    Baso (Absolute) 0.01 0.00 - 0.12 K/uL    Immature Granulocytes (abs) 0.06 0.00 - 0.11 K/uL    NRBC (Absolute) 0.00 K/uL   COMP METABOLIC PANEL   Result Value Ref Range    Sodium 139 135 - 145 mmol/L    Potassium 4.0 3.6 - 5.5 mmol/L    Chloride 105 96 - 112 mmol/L    Co2 20 20 - 33 mmol/L    Anion Gap 14.0 7.0 - 16.0    Glucose 112 (H) 65 - 99 mg/dL    Bun 21 8 - 22 mg/dL    Creatinine 0.73 0.50 - 1.40 mg/dL    Calcium 9.3 8.5 - 10.5 mg/dL    AST(SGOT) 27 12 - 45 U/L    ALT(SGPT) 32 2 - 50 U/L    Alkaline Phosphatase 76 30 - 99 U/L    Total Bilirubin 0.3 0.1 - 1.5 mg/dL    Albumin 4.0 3.2 - 4.9 g/dL    Total Protein 6.4 6.0 - 8.2 g/dL    Globulin 2.4 1.9 - 3.5 g/dL    A-G Ratio 1.7 g/dL   APTT   Result Value Ref Range    APTT 26.6 24.7 - 36.0 sec   PROTHROMBIN TIME (INR)   Result Value Ref Range    PT 13.2 12.0 - 14.6 sec    INR 1.03 0.87 - 1.13   COD (ADULT)   Result Value Ref Range    ABO Grouping Only A     Rh Grouping Only POS     Antibody Screen-Cod NEG    SARS-COV Antigen TODD   Result Value Ref Range    SARS-CoV-2 Source Nasal Swab     SARS-COV ANTIGEN TODD NotDetected NotDetected   ESTIMATED GFR   Result Value Ref Range    GFR (CKD-EPI) 88 >60 mL/min/1.73 m 2   EKG (NOW)   Result Value Ref Range    Report       [facility] Emergency Dept.    Test Date:  2022  Pt Name:    NATE BURRELL                 Department: ER  MRN:        [id number]                      Room:       [facility]  Gender:     Female                       Technician: [id number]  :         1950                   Requested By:ROBINA NO  Order #:    [id number]                    Reading MD:    Measurements  Intervals                                Axis  Rate:       68                           P:          53  NY:         144                          QRS:        46  QRSD:       96                           T:          77  QT:         476  QTc:        507    Interpretive Statements  SINUS RHYTHM  NONSPECIFIC T ABNORMALITIES, ANTERIOR LEADS  BORDERLINE PROLONGED QT INTERVAL  No previous ECG available for comparison       All labs reviewed by me.     EKG  Interpreted by me as shown above.     RADIOLOGY  DX-KNEE 2- RIGHT   Final Result      1.  There is no acute fracture or malalignment of the right knee.      DX-FEMUR-2+ RIGHT   Final Result      Impacted transcervical right femoral neck fracture.      DX-PELVIS-1 OR 2 VIEWS   Final Result      1.  Impacted transcervical right femoral neck fracture.      DX-CHEST-PORTABLE (1 VIEW)   Final Result      No acute cardiac or pulmonary abnormalities are identified.      DX-WRIST-COMPLETE 3+ RIGHT   Final Result      Impacted, comminuted intra-articular distal radius fracture.      DX-HIP-COMPLETE - UNILATERAL 2+ RIGHT    (Results Pending)   DX-PORTABLE FLUORO > 1 HOUR    (Results Pending)     The radiologist's interpretation of all radiological studies have been reviewed by me.    COURSE & MEDICAL DECISION MAKING  Pertinent Labs & Imaging studies reviewed. (See chart for details)    Obtained and reviewed past medical records.    5:42 PM Patient seen and examined at bedside. The patient presents with injuries secondary to a ground level fall, and the differential diagnosis includes but is not limited to right wrist fracture, right hip fracture, pelvic fracture.. Ordered for DX-wrist 3+ right, DX-Chest, DX-Knee complete 2 right, DX- femur 2+ right, and DX-Pelvis one or two views to evaluate.     6:47 PM Paged Ortho.     6:49 PM Ordered CBC with  differential, CMP, APTT, Prothrombin time, COD, and EKG for evaluation prior to operation.      7:00 PM I discussed the patient's case and the above findings with Ortho.  Case discussed with orthopedic PA will see the patient discussed this with the orthopedic attending.  Preoperative labs and EKG ordered.  I have ordered a splint to the right wrist.  The patient was taken the operating room tonight per report.    7:24 PM Paged Hospitalist    7:48 PM I discussed the patient's case and the above findings with Dr. Diaz (Hospitalist) who agreed to assess the patient for hospitalization.          DISPOSITION:  Patient will be hospitalized by Dr. Diaz in guarded condition.      FINAL IMPRESSION  1. Fall, initial encounter    2. Closed fracture of right hip, initial encounter ([facility])    3. Closed fracture of right wrist, initial encounter          IJudson (Scribe), am scribing for, and in the presence of, Ahmet Llanos M.D..    Electronically signed by: Judson Parra (Scribe), 4/29/2022    IAhmet M.D. personally performed the services described in this documentation, as scribed by Judson Parra in my presence, and it is both accurate and complete.    The note accurately reflects work and decisions made by me.  Ahmet Llanos M.D.  4/29/2022  9:50 PM

## 2022-04-30 NOTE — PROGRESS NOTES
72yoF with right vaglus impacted femoral neck fracture and minimally displaced distal radius metaphyseal fx.  Planning surgical fixation of right femoral neck fx and nonoperative management for distal radius fx.  See full dictated consultation for further details.

## 2022-04-30 NOTE — ASSESSMENT & PLAN NOTE
Imaging X-rays done showed right valgus impacted femoral neck fracture and minimally displaced distal radius metaphyseal fracture.   --  Orthopedics was consulted, patient underwent  Open treatment with internal fixation of right femoral neck fracture; Closed treatment without manipulation, right extra-articular distal radius   -- PT and OT recs post-acute; physiatry and SNF referral has been placed, pending evaluation.   -- DVt ppx    Medically cleared to be discharged

## 2022-04-30 NOTE — ED TRIAGE NOTES
"Chief Complaint   Patient presents with   • T-5000 GLF     Pt fell onto her R side after a dog caused her to fall onto her R wrist and R hip. Pt has R hip and R wrist pain. No obvious deformities, CMS intact         Pt BIB EMS from home. No rotation to RLE. Pt given 100 of fentanyl en route. Pt has splint placed by [facility] for comfort. Pt aox4, GCS !5      BP (!) 193/81   Pulse 88   Temp 36.7 °C (98.1 °F) (Temporal)   Resp 16   Ht 1.676 m (5' 6\")   Wt 88.5 kg (195 lb)   SpO2 98%   BMI 31.47 kg/m²     "

## 2022-04-30 NOTE — HOSPITAL COURSE
This is a 72 y.o. female with no significant pmhx presented to ER on 4/29/2022 with status post fall landing on her right hip and right wrist.      Imaging X-rays done showed right valgus impacted femoral neck fracture and minimally displaced distal radius metaphyseal fracture.  Orthopedics was consulted, patient underwent  Open treatment with internal fixation of right femoral neck fracture; Closed treatment without manipulation, right extra-articular distal radius  Fracture on 4/29. Per ortho,TTWB RLE and NWB R wrist, okay to WB thru elbow with platform walker  PT and OT pending      Interval events:  --No acute events overnight, vital sign has been stable, patient is requiring 2 to 3 L of oxygen.  Patient underwent surgery yesterday by Dr. Zuniga.  Dr. Zuniga recommended toe-touch weightbearing the right lower extremity; nonweightbearing right chest.  --PT/OT pending.  -Patient admitted to have macrocytosis, will obtain vitamin B12, TSH.    --Patient noted to have some-pancytopenia with a platelet of 69.  Repeat CBC pending.  Monitor for active bleeding.

## 2022-05-01 PROBLEM — D64.9 ANEMIA: Status: ACTIVE | Noted: 2022-05-01

## 2022-05-01 LAB
ABO + RH BLD: NORMAL
ANION GAP SERPL CALC-SCNC: 8 MMOL/L (ref 7–16)
BUN SERPL-MCNC: 12 MG/DL (ref 8–22)
CALCIUM SERPL-MCNC: 8.7 MG/DL (ref 8.5–10.5)
CHLORIDE SERPL-SCNC: 103 MMOL/L (ref 96–112)
CO2 SERPL-SCNC: 25 MMOL/L (ref 20–33)
CREAT SERPL-MCNC: 0.54 MG/DL (ref 0.5–1.4)
ERYTHROCYTE [DISTWIDTH] IN BLOOD BY AUTOMATED COUNT: 50.2 FL (ref 35.9–50)
GFR SERPLBLD CREATININE-BSD FMLA CKD-EPI: 99 ML/MIN/1.73 M 2
GLUCOSE SERPL-MCNC: 108 MG/DL (ref 65–99)
HCT VFR BLD AUTO: 30.4 % (ref 37–47)
HGB BLD-MCNC: 10.2 G/DL (ref 12–16)
MCH RBC QN AUTO: 32.7 PG (ref 27–33)
MCHC RBC AUTO-ENTMCNC: 33.6 G/DL (ref 33.6–35)
MCV RBC AUTO: 97.4 FL (ref 81.4–97.8)
PLATELET # BLD AUTO: 61 K/UL (ref 164–446)
PMV BLD AUTO: 10.7 FL (ref 9–12.9)
POTASSIUM SERPL-SCNC: 4 MMOL/L (ref 3.6–5.5)
RBC # BLD AUTO: 3.12 M/UL (ref 4.2–5.4)
SODIUM SERPL-SCNC: 136 MMOL/L (ref 135–145)
TSH SERPL DL<=0.005 MIU/L-ACNC: 1.82 UIU/ML (ref 0.38–5.33)
VIT B12 SERPL-MCNC: 496 PG/ML (ref 211–911)
WBC # BLD AUTO: 6.9 K/UL (ref 4.8–10.8)

## 2022-05-01 PROCEDURE — 700111 HCHG RX REV CODE 636 W/ 250 OVERRIDE (IP): Performed by: HOSPITALIST

## 2022-05-01 PROCEDURE — 700102 HCHG RX REV CODE 250 W/ 637 OVERRIDE(OP): Performed by: PHYSICIAN ASSISTANT

## 2022-05-01 PROCEDURE — A9270 NON-COVERED ITEM OR SERVICE: HCPCS | Performed by: HOSPITALIST

## 2022-05-01 PROCEDURE — 80048 BASIC METABOLIC PNL TOTAL CA: CPT

## 2022-05-01 PROCEDURE — 99232 SBSQ HOSP IP/OBS MODERATE 35: CPT | Performed by: HOSPITALIST

## 2022-05-01 PROCEDURE — 700102 HCHG RX REV CODE 250 W/ 637 OVERRIDE(OP): Performed by: STUDENT IN AN ORGANIZED HEALTH CARE EDUCATION/TRAINING PROGRAM

## 2022-05-01 PROCEDURE — A9270 NON-COVERED ITEM OR SERVICE: HCPCS | Performed by: PHYSICIAN ASSISTANT

## 2022-05-01 PROCEDURE — 85027 COMPLETE CBC AUTOMATED: CPT

## 2022-05-01 PROCEDURE — A9270 NON-COVERED ITEM OR SERVICE: HCPCS | Performed by: STUDENT IN AN ORGANIZED HEALTH CARE EDUCATION/TRAINING PROGRAM

## 2022-05-01 PROCEDURE — 770001 HCHG ROOM/CARE - MED/SURG/GYN PRIV*

## 2022-05-01 PROCEDURE — 700102 HCHG RX REV CODE 250 W/ 637 OVERRIDE(OP): Performed by: HOSPITALIST

## 2022-05-01 PROCEDURE — 36415 COLL VENOUS BLD VENIPUNCTURE: CPT

## 2022-05-01 PROCEDURE — 82607 VITAMIN B-12: CPT

## 2022-05-01 PROCEDURE — 84443 ASSAY THYROID STIM HORMONE: CPT

## 2022-05-01 PROCEDURE — 700111 HCHG RX REV CODE 636 W/ 250 OVERRIDE (IP): Performed by: STUDENT IN AN ORGANIZED HEALTH CARE EDUCATION/TRAINING PROGRAM

## 2022-05-01 RX ORDER — OXYCODONE HYDROCHLORIDE 5 MG/1
5 TABLET ORAL ONCE
Status: COMPLETED | OUTPATIENT
Start: 2022-05-01 | End: 2022-05-01

## 2022-05-01 RX ORDER — MORPHINE SULFATE 4 MG/ML
2-4 INJECTION INTRAVENOUS
Status: DISCONTINUED | OUTPATIENT
Start: 2022-05-01 | End: 2022-05-03 | Stop reason: HOSPADM

## 2022-05-01 RX ORDER — HYDROCODONE BITARTRATE AND ACETAMINOPHEN 5; 325 MG/1; MG/1
1 TABLET ORAL EVERY 6 HOURS PRN
Status: DISCONTINUED | OUTPATIENT
Start: 2022-05-01 | End: 2022-05-03 | Stop reason: HOSPADM

## 2022-05-01 RX ORDER — METHOCARBAMOL 750 MG/1
750 TABLET, FILM COATED ORAL 3 TIMES DAILY PRN
Status: DISCONTINUED | OUTPATIENT
Start: 2022-05-01 | End: 2022-05-03 | Stop reason: HOSPADM

## 2022-05-01 RX ORDER — KETOROLAC TROMETHAMINE 30 MG/ML
15 INJECTION, SOLUTION INTRAMUSCULAR; INTRAVENOUS EVERY 6 HOURS PRN
Status: DISCONTINUED | OUTPATIENT
Start: 2022-05-01 | End: 2022-05-03 | Stop reason: HOSPADM

## 2022-05-01 RX ORDER — HYDROMORPHONE HYDROCHLORIDE 2 MG/1
2 TABLET ORAL ONCE
Status: DISCONTINUED | OUTPATIENT
Start: 2022-05-01 | End: 2022-05-01

## 2022-05-01 RX ADMIN — HYDROCODONE BITARTRATE AND ACETAMINOPHEN 1 TABLET: 5; 325 TABLET ORAL at 10:36

## 2022-05-01 RX ADMIN — KETOROLAC TROMETHAMINE 15 MG: 30 INJECTION, SOLUTION INTRAMUSCULAR at 15:24

## 2022-05-01 RX ADMIN — HYDROCODONE BITARTRATE AND ACETAMINOPHEN 1 TABLET: 5; 325 TABLET ORAL at 21:46

## 2022-05-01 RX ADMIN — METHOCARBAMOL 750 MG: 750 TABLET ORAL at 19:51

## 2022-05-01 RX ADMIN — OXYCODONE 5 MG: 5 TABLET ORAL at 05:15

## 2022-05-01 RX ADMIN — HYDROCODONE BITARTRATE AND ACETAMINOPHEN 1 TABLET: 5; 325 TABLET ORAL at 15:24

## 2022-05-01 RX ADMIN — METHOCARBAMOL 750 MG: 750 TABLET ORAL at 10:37

## 2022-05-01 RX ADMIN — KETOROLAC TROMETHAMINE 15 MG: 30 INJECTION, SOLUTION INTRAMUSCULAR at 21:46

## 2022-05-01 RX ADMIN — ACETAMINOPHEN 1000 MG: 500 TABLET ORAL at 00:30

## 2022-05-01 RX ADMIN — ENOXAPARIN SODIUM 40 MG: 40 INJECTION SUBCUTANEOUS at 05:15

## 2022-05-01 ASSESSMENT — ENCOUNTER SYMPTOMS
SHORTNESS OF BREATH: 0
FEVER: 0
VOMITING: 0
DEPRESSION: 0
HEMOPTYSIS: 0
NAUSEA: 0
ORTHOPNEA: 0
MYALGIAS: 1
PHOTOPHOBIA: 0
PALPITATIONS: 0
ABDOMINAL PAIN: 0
NERVOUS/ANXIOUS: 0
COUGH: 0
SORE THROAT: 0
HEARTBURN: 0
FOCAL WEAKNESS: 0
CHILLS: 0
SPUTUM PRODUCTION: 0
HEADACHES: 0
CLAUDICATION: 0

## 2022-05-01 ASSESSMENT — PAIN DESCRIPTION - PAIN TYPE
TYPE: ACUTE PAIN
TYPE: SURGICAL PAIN
TYPE: ACUTE PAIN
TYPE: SURGICAL PAIN

## 2022-05-01 NOTE — PROGRESS NOTES
0115- Patient rating pain 5/10. PRN Tylenol 1000mg given with little to no relief. No other PRN medication available. On-call hospitalist called and ordered obtained for one time dose of PO Dilaudid 2mg. Patient did not want to take medication.     0515- Patient rating pain 5/10. On-call hospitalist notified. Ordered obtained for one time dose of PO oxycodone 5mg.

## 2022-05-01 NOTE — PROGRESS NOTES
[facility] Medicine Daily Progress Note    Date of Service  5/1/2022    Chief Complaint  Carmita Miller is a 72 y.o. female admitted 4/29/2022 with   Chief Complaint   Patient presents with   • T-5000 GLF     Pt fell onto her R side after a dog caused her to fall onto her R wrist and R hip. Pt has R hip and R wrist pain. No obvious deformities, CMS intact         Hospital Course  This is a 72 y.o. female with no significant pmhx presented to ER on 4/29/2022 with status post fall landing on her right hip and right wrist.      Imaging X-rays done showed right valgus impacted femoral neck fracture and minimally displaced distal radius metaphyseal fracture.  Orthopedics was consulted, patient underwent  Open treatment with internal fixation of right femoral neck fracture; Closed treatment without manipulation, right extra-articular distal radius  Fracture on 4/29. Per ortho,TTWB RLE and NWB R wrist, okay to WB thru elbow with platform walker  PT and OT pending    5/1  No acute events overnight, vital sign has been stable, patient saturating well on 2.5 L of oxygen.  Discussed with nursing staff about weaning the patient off of oxygen.  PT/OT has evaluate the patient, recommended postacute, SNF/physiatry.  Place.  Patient stated her pain is not well controlled, started on Norco, morphine, Robaxin, scheduled Tylenol.  Patient need to be out of bed  Hemoglobin medical continue to monitor, today 10.2, patient not actively bleeding will monitor.  Patient clinically has been well over 61, will obtain ultrasound of the abdomen.    Interval events:  --No acute events overnight, vital sign has been stable, patient is requiring 2 to 3 L of oxygen.  Patient underwent surgery yesterday by Dr. Zuniga.  Dr. Zuniga recommended toe-touch weightbearing the right lower extremity; nonweightbearing right chest.  --PT/OT pending.  -Patient admitted to have macrocytosis, will obtain vitamin B12, TSH.    --Patient noted to have  some-pancytopenia with a platelet of 69.  Repeat CBC pending.  Monitor for active bleeding.    I have personally seen and examined the patient at bedside. I discussed the plan of care with patient, bedside RN, charge RN and .    Consultants/Specialty  orthopedics    Code Status  Full Code    Disposition  Patient is not medically cleared for discharge.   Anticipate discharge to to skilled nursing facility.  I have placed the appropriate orders for post-discharge needs.    Review of Systems  Review of Systems   Constitutional: Positive for malaise/fatigue. Negative for chills and fever.   HENT: Negative for congestion and sore throat.    Eyes: Negative for photophobia.   Respiratory: Negative for cough, hemoptysis, sputum production and shortness of breath.    Cardiovascular: Negative for chest pain, palpitations, orthopnea and claudication.   Gastrointestinal: Negative for abdominal pain, heartburn, nausea and vomiting.   Musculoskeletal: Positive for joint pain and myalgias.   Neurological: Negative for focal weakness and headaches.   Psychiatric/Behavioral: Negative for depression. The patient is not nervous/anxious.    All other systems reviewed and are negative.       Physical Exam  Temp:  [36.3 °C (97.4 °F)-36.9 °C (98.5 °F)] 36.6 °C (97.9 °F)  Pulse:  [61-71] 68  Resp:  [17-18] 17  BP: (116-122)/(59-64) 116/64  SpO2:  [94 %-99 %] 94 %    Physical Exam  Vitals and nursing note reviewed.   Constitutional:       Appearance: Normal appearance.   HENT:      Head: Normocephalic and atraumatic.   Eyes:      Extraocular Movements: Extraocular movements intact.   Cardiovascular:      Rate and Rhythm: Normal rate.      Pulses: Normal pulses.   Pulmonary:      Effort: No respiratory distress.      Breath sounds: Normal breath sounds.   Abdominal:      General: Bowel sounds are normal.      Tenderness: There is no abdominal tenderness.   Musculoskeletal:      Cervical back: Neck supple.      Left lower leg: No  edema.      Comments: Decreased ROM on Right side likely 2/2 pain   Does have surgical dressing on the Right Forearm   Skin:     General: Skin is warm.   Neurological:      Mental Status: She is alert and oriented to person, place, and time.      Cranial Nerves: No cranial nerve deficit.         Fluids  No intake or output data in the 24 hours ending 05/01/22 1030    Laboratory  Recent Labs     04/29/22 1943 05/01/22  0424   WBC 8.5 6.9   RBC 3.92* 3.12*   HEMOGLOBIN 12.7 10.2*   HEMATOCRIT 39.1 30.4*   MCV 99.7* 97.4   MCH 32.4 32.7   MCHC 32.5* 33.6   RDW 51.0* 50.2*   PLATELETCT 69* 61*   MPV 10.0 10.7     Recent Labs     04/29/22 1943 05/01/22 0424   SODIUM 139 136   POTASSIUM 4.0 4.0   CHLORIDE 105 103   CO2 20 25   GLUCOSE 112* 108*   BUN 21 12   CREATININE 0.73 0.54   CALCIUM 9.3 8.7     Recent Labs     04/29/22 1943   APTT 26.6   INR 1.03               Imaging  DX-HIP-COMPLETE - UNILATERAL 2+ RIGHT   Final Result      Digitized intraoperative radiograph is submitted for review.  This examination is not for diagnostic purpose but for guidance during a surgical procedure.      DX-KNEE 2- RIGHT   Final Result      1.  There is no acute fracture or malalignment of the right knee.      DX-FEMUR-2+ RIGHT   Final Result      Impacted transcervical right femoral neck fracture.      DX-PELVIS-1 OR 2 VIEWS   Final Result      1.  Impacted transcervical right femoral neck fracture.      DX-CHEST-PORTABLE (1 VIEW)   Final Result      No acute cardiac or pulmonary abnormalities are identified.      DX-WRIST-COMPLETE 3+ RIGHT   Final Result      Impacted, comminuted intra-articular distal radius fracture.      DX-PORTABLE FLUORO > 1 HOUR    (Results Pending)        Assessment/Plan  * Fracture of femoral neck, right, closed (HCC)  Assessment & Plan  Imaging X-rays done showed right valgus impacted femoral neck fracture and minimally displaced distal radius metaphyseal fracture.   --  Orthopedics was consulted, patient  underwent  Open treatment with internal fixation of right femoral neck fracture; Closed treatment without manipulation, right extra-articular distal radius   -- PT and OT recs post-acute; physiatry and SNF referral has been placed, pending evaluation.   -- DVt ppx      Anemia  Assessment & Plan  Monitor hemoglobin hematocrit, if less than 7, transfuse likely secondary to acute blood loss anemia    Thrombocytopenia (HCC)  Assessment & Plan  At 61  Not actively bleeding   monitpr    Macrocytosis  Assessment & Plan  Obtain TSH/ Vitamin b12    Obesity (BMI 30-39.9)  Assessment & Plan   on dietary and lifestyle modification once mental status improves     Closed fracture of distal end of right radius  Assessment & Plan  Non op. Ortho on board   PT/OT  Pain control        VTE prophylaxis: Lovenox  I have performed a physical exam and reviewed and updated ROS and Plan today (5/1/2022). In review of yesterday's note (4/30/2022), there are no changes except as documented above.

## 2022-05-01 NOTE — CARE PLAN
The patient is Stable - Low risk of patient condition declining or worsening    Shift Goals  Clinical Goals: Pain Control, Mobility  Patient Goals: Pain control, Rest  Family Goals: Not present    Progress made toward(s) clinical / shift goals:    Problem: Knowledge Deficit - Standard  Goal: Patient and family/care givers will demonstrate understanding of plan of care, disease process/condition, diagnostic tests and medications  Outcome: Progressing  Note: Plan of care discussed, verbalized understanding. Questions answered      Problem: Fall Risk  Goal: Patient will remain free from falls  Outcome: Progressing  Note: Patient educated on fall precautions in place, verbalized understanding. Hourly rounding in effect     Problem: Mobility  Goal: Patient's capacity to carry out activities will improve  Outcome: Progressing     Problem: Pain - Standard  Goal: Alleviation of pain or a reduction in pain to the patient’s comfort goal  Outcome: Progressing  Note: Patient educated on pain scale and to notify RN of pain. Medicated per MAR and repositioned          Patient is not progressing towards the following goals:

## 2022-05-01 NOTE — CARE PLAN
Problem: Fall Risk  Goal: Patient will remain free from falls  Outcome: Progressing     Problem: Mobility  Goal: Patient's capacity to carry out activities will improve  Outcome: Progressing       The patient is Stable - Low risk of patient condition declining or worsening    Shift Goals  Clinical Goals: pain control, mobility  Patient Goals: pain control, rest  Family Goals: comfort    Progress made toward(s) clinical / shift goals:  Pt's pain is controlled with PRN meds. Reinforce fall/safety precautions. Call light within reach. Bed on low and locked.     Patient is not progressing towards the following goals:

## 2022-05-01 NOTE — ASSESSMENT & PLAN NOTE
Monitor hemoglobin hematocrit, if less than 7, transfuse likely secondary to acute blood loss anemia

## 2022-05-02 ENCOUNTER — APPOINTMENT (OUTPATIENT)
Dept: RADIOLOGY | Facility: MEDICAL CENTER | Age: 72
DRG: 481 | End: 2022-05-02
Attending: ORTHOPAEDIC SURGERY
Payer: MEDICARE

## 2022-05-02 PROBLEM — E87.1 HYPONATREMIA: Status: ACTIVE | Noted: 2022-05-02

## 2022-05-02 LAB
ALBUMIN SERPL BCP-MCNC: 3.3 G/DL (ref 3.2–4.9)
BUN SERPL-MCNC: 14 MG/DL (ref 8–22)
CALCIUM SERPL-MCNC: 8.8 MG/DL (ref 8.5–10.5)
CHLORIDE SERPL-SCNC: 100 MMOL/L (ref 96–112)
CO2 SERPL-SCNC: 25 MMOL/L (ref 20–33)
CREAT SERPL-MCNC: 0.59 MG/DL (ref 0.5–1.4)
ERYTHROCYTE [DISTWIDTH] IN BLOOD BY AUTOMATED COUNT: 50.3 FL (ref 35.9–50)
GFR SERPLBLD CREATININE-BSD FMLA CKD-EPI: 97 ML/MIN/1.73 M 2
GLUCOSE SERPL-MCNC: 106 MG/DL (ref 65–99)
HCT VFR BLD AUTO: 29.4 % (ref 37–47)
HGB BLD-MCNC: 9.8 G/DL (ref 12–16)
MCH RBC QN AUTO: 32.6 PG (ref 27–33)
MCHC RBC AUTO-ENTMCNC: 33.3 G/DL (ref 33.6–35)
MCV RBC AUTO: 97.7 FL (ref 81.4–97.8)
PHOSPHATE SERPL-MCNC: 2.8 MG/DL (ref 2.5–4.5)
PLATELET # BLD AUTO: 62 K/UL (ref 164–446)
PMV BLD AUTO: 9.9 FL (ref 9–12.9)
POTASSIUM SERPL-SCNC: 4.1 MMOL/L (ref 3.6–5.5)
RBC # BLD AUTO: 3.01 M/UL (ref 4.2–5.4)
SODIUM SERPL-SCNC: 132 MMOL/L (ref 135–145)
WBC # BLD AUTO: 5.6 K/UL (ref 4.8–10.8)

## 2022-05-02 PROCEDURE — 97116 GAIT TRAINING THERAPY: CPT

## 2022-05-02 PROCEDURE — 85027 COMPLETE CBC AUTOMATED: CPT

## 2022-05-02 PROCEDURE — 99223 1ST HOSP IP/OBS HIGH 75: CPT | Performed by: PHYSICAL MEDICINE & REHABILITATION

## 2022-05-02 PROCEDURE — 73630 X-RAY EXAM OF FOOT: CPT | Mod: RT

## 2022-05-02 PROCEDURE — 99232 SBSQ HOSP IP/OBS MODERATE 35: CPT | Performed by: HOSPITALIST

## 2022-05-02 PROCEDURE — 97535 SELF CARE MNGMENT TRAINING: CPT

## 2022-05-02 PROCEDURE — 700111 HCHG RX REV CODE 636 W/ 250 OVERRIDE (IP): Performed by: HOSPITALIST

## 2022-05-02 PROCEDURE — 36415 COLL VENOUS BLD VENIPUNCTURE: CPT

## 2022-05-02 PROCEDURE — 700102 HCHG RX REV CODE 250 W/ 637 OVERRIDE(OP): Performed by: HOSPITALIST

## 2022-05-02 PROCEDURE — 80069 RENAL FUNCTION PANEL: CPT

## 2022-05-02 PROCEDURE — 770001 HCHG ROOM/CARE - MED/SURG/GYN PRIV*

## 2022-05-02 PROCEDURE — A9270 NON-COVERED ITEM OR SERVICE: HCPCS | Performed by: HOSPITALIST

## 2022-05-02 RX ORDER — POLYETHYLENE GLYCOL 3350 17 G/17G
1 POWDER, FOR SOLUTION ORAL DAILY
Status: DISCONTINUED | OUTPATIENT
Start: 2022-05-02 | End: 2022-05-03 | Stop reason: HOSPADM

## 2022-05-02 RX ORDER — SENNOSIDES A AND B 8.6 MG/1
8.6 TABLET, FILM COATED ORAL
Status: DISCONTINUED | OUTPATIENT
Start: 2022-05-02 | End: 2022-05-03 | Stop reason: HOSPADM

## 2022-05-02 RX ORDER — BISACODYL 10 MG
10 SUPPOSITORY, RECTAL RECTAL DAILY
Status: DISCONTINUED | OUTPATIENT
Start: 2022-05-02 | End: 2022-05-03 | Stop reason: HOSPADM

## 2022-05-02 RX ADMIN — KETOROLAC TROMETHAMINE 15 MG: 30 INJECTION, SOLUTION INTRAMUSCULAR at 04:14

## 2022-05-02 RX ADMIN — METHOCARBAMOL 750 MG: 750 TABLET ORAL at 12:27

## 2022-05-02 RX ADMIN — METHOCARBAMOL 750 MG: 750 TABLET ORAL at 19:15

## 2022-05-02 RX ADMIN — KETOROLAC TROMETHAMINE 15 MG: 30 INJECTION, SOLUTION INTRAMUSCULAR at 16:11

## 2022-05-02 RX ADMIN — HYDROCODONE BITARTRATE AND ACETAMINOPHEN 1 TABLET: 5; 325 TABLET ORAL at 19:15

## 2022-05-02 RX ADMIN — HYDROCODONE BITARTRATE AND ACETAMINOPHEN 1 TABLET: 5; 325 TABLET ORAL at 04:15

## 2022-05-02 RX ADMIN — HYDROCODONE BITARTRATE AND ACETAMINOPHEN 1 TABLET: 5; 325 TABLET ORAL at 12:27

## 2022-05-02 RX ADMIN — POLYETHYLENE GLYCOL 3350 1 PACKET: 17 POWDER, FOR SOLUTION ORAL at 16:52

## 2022-05-02 RX ADMIN — ENOXAPARIN SODIUM 40 MG: 40 INJECTION SUBCUTANEOUS at 05:04

## 2022-05-02 ASSESSMENT — ENCOUNTER SYMPTOMS
NERVOUS/ANXIOUS: 0
HEARTBURN: 0
PALPITATIONS: 0
HEADACHES: 0
SORE THROAT: 0
COUGH: 0
PHOTOPHOBIA: 0
BLURRED VISION: 0
ORTHOPNEA: 0
CONSTIPATION: 0
MYALGIAS: 1
CLAUDICATION: 0
SHORTNESS OF BREATH: 0
VOMITING: 0
FEVER: 0
FOCAL WEAKNESS: 0
ABDOMINAL PAIN: 0
CHILLS: 0
DEPRESSION: 0

## 2022-05-02 ASSESSMENT — GAIT ASSESSMENTS
GAIT LEVEL OF ASSIST: MINIMAL ASSIST
ASSISTIVE DEVICE: PLATFORM WALKER
DEVIATION: ANTALGIC;STEP TO
DISTANCE (FEET): 5

## 2022-05-02 ASSESSMENT — COGNITIVE AND FUNCTIONAL STATUS - GENERAL
SUGGESTED CMS G CODE MODIFIER DAILY ACTIVITY: CK
MOBILITY SCORE: 13
TOILETING: A LITTLE
CLIMB 3 TO 5 STEPS WITH RAILING: TOTAL
DAILY ACTIVITIY SCORE: 19
MOVING FROM LYING ON BACK TO SITTING ON SIDE OF FLAT BED: A LOT
STANDING UP FROM CHAIR USING ARMS: A LITTLE
PERSONAL GROOMING: A LITTLE
MOVING TO AND FROM BED TO CHAIR: A LOT
HELP NEEDED FOR BATHING: A LITTLE
DRESSING REGULAR UPPER BODY CLOTHING: A LITTLE
DRESSING REGULAR LOWER BODY CLOTHING: A LITTLE
WALKING IN HOSPITAL ROOM: A LITTLE
SUGGESTED CMS G CODE MODIFIER MOBILITY: CL
TURNING FROM BACK TO SIDE WHILE IN FLAT BAD: A LOT

## 2022-05-02 ASSESSMENT — PAIN DESCRIPTION - PAIN TYPE
TYPE: SURGICAL PAIN
TYPE: ACUTE PAIN;SURGICAL PAIN
TYPE: SURGICAL PAIN
TYPE: ACUTE PAIN;SURGICAL PAIN

## 2022-05-02 ASSESSMENT — PAIN SCALES - GENERAL: PAIN_LEVEL: 4

## 2022-05-02 NOTE — THERAPY
Physical Therapy   Daily Treatment     Patient Name: Carmita Miller  Age:  72 y.o., Sex:  female  Medical Record #: [id number]  Today's Date: 5/2/2022     Precautions  Precautions: Fall Risk;Toe Touch Weight Bearing Right Lower Extremity;Platform Weight Bearing Right Upper Extremity  Comments: NWB R wrist and hand    Assessment    Pt seen for follow up PT tx. Pt provided with max cues and demonstration in order to initiate gait training. Pt was able to ambulate 5ft 2x with platform walker and min assist. Encouragement provided as pt is progressing well, continue to recommend placement. PT will cont while in acute    Plan    Continue current treatment plan.    DC Equipment Recommendations: Unable to determine at this time  Discharge Recommendations: Recommend post-acute placement for additional physical therapy services prior to discharge home       05/02/22 1425   Pain 0 - 10 Group   Therapist Pain Assessment During Activity;Nurse Notified  (no complaints)   Non Verbal Descriptors   Non Verbal Scale  Calm   Cognition    Level of Consciousness Alert   Comments cooperative   Other Treatments   Other Treatments Provided spoke with  on speaker phone and encoutraged ramp and WC   Balance   Sitting Balance (Static) Fair   Sitting Balance (Dynamic) Fair   Standing Balance (Static) Poor +   Standing Balance (Dynamic) Poor +   Weight Shift Sitting Fair   Weight Shift Standing Poor   Skilled Intervention Verbal Cuing;Compensatory Strategies   Comments PFWW   Gait Analysis   Gait Level Of Assist Minimal Assist   Assistive Device Platform Walker   Distance (Feet) 5   # of Times Distance was Traveled 2   Deviation Antalgic;Step To   # of Stairs Climbed 0   Weight Bearing Status TTWB RLE and PWB R UE   Skilled Intervention Verbal Cuing;Compensatory Strategies   Comments demonstration required for gait   Bed Mobility    Comments received in recliner and left EOB with OT for handoff   Functional Mobility   Sit to Stand Minimal  Assist   Bed, Chair, Wheelchair Transfer Minimal Assist   Transfer Method Stand Step   Mobility in room with Platform  FWW   Skilled Intervention Verbal Cuing;Compensatory Strategies;Sequencing   Patient / Family Goals    Patient / Family Goal #1 get strong enough to go home   Goal #1 Outcome Progressing as expected   Short Term Goals    Short Term Goal # 1 supine to/from sit EOB without use of railing, HOB flat with SBA in 6 visits   Goal Outcome # 1 goal not met   Short Term Goal # 2 sit to stand from EOB to FWW while maintaining WB precautions, SBA in 6 visits   Goal Outcome # 2 Goal not met   Short Term Goal # 3 ambulation with FWW and CGA maintaining WB precautions, 30 feet in 6 visits   Goal Outcome # 3 Goal not met   Short Term Goal # 4 up/down 2 steps with use of rail and min assist while maintaining WB precautions in 6 visits   Goal Outcome # 4 Goal not met   Anticipated Discharge Equipment and Recommendations   DC Equipment Recommendations Unable to determine at this time   Discharge Recommendations Recommend post-acute placement for additional physical therapy services prior to discharge home

## 2022-05-02 NOTE — DISCHARGE PLANNING
Met with patient at bedside and her  Anthony via speaker phone. Discussed possible plan for discharge to acute rehab pending PM&R consult. Anthony thinks that [facility] may be managing their Med[employer] coverage but not sure and will contact them to check. He will email copy of insurance card to this CM to forward to PFA. If [facility] coverage patient will need auth for rehab and or snf stay. CM will continue to follow.

## 2022-05-02 NOTE — ANESTHESIA POSTPROCEDURE EVALUATION
Patient: Carmita Miller    Procedure Summary     Date: 04/29/22 Room / Location: [place] [address] / SURGERY [facility]    Anesthesia Start: 2047 Anesthesia Stop: 2233    Procedure: ORIF, HIP, SPLINTING RIGHT WRIST (Right ) Diagnosis: (Right Femoral Neck Fracture Right Distal Radius Fracture)    Surgeons: Cassius Zuniga M.D. Responsible Provider: Troy Porter M.D.    Anesthesia Type: general, peripheral nerve block ASA Status: 2          Final Anesthesia Type: general, peripheral nerve block  Last vitals  BP   Blood Pressure : 100/64    Temp   36.6 °C (97.8 °F)    Pulse   65   Resp   18    SpO2   94 %      Anesthesia Post Evaluation    Patient location during evaluation: PACU  Patient participation: complete - patient participated  Level of consciousness: awake and alert  Pain score: 4    Airway patency: patent  Anesthetic complications: no  Cardiovascular status: hemodynamically stable  Respiratory status: acceptable  Hydration status: euvolemic    PONV: none          No complications documented.     Nurse Pain Score: 4 (NPRS)

## 2022-05-02 NOTE — PROGRESS NOTES
72yoF with right vaglus impacted femoral neck fracture s/p ORIF 4/30 and minimally displaced distal radius metaphyseal fx treating nonoperatively.     S: Doing okay, noticed some pain in right foot.    O:    Vitals:    05/02/22 0808   BP: 100/64   Pulse: 65   Resp: 18   Temp: 36.6 °C (97.8 °F)   SpO2: 94%     Exam:  General-NAD, alert and following commands  RLE-hip dressing c/d/i, NVI distally  RUE-splint c/d/i, flexing/extending fingers, BCR and SILT in fingers    Hct: 29.4    A: 72yoF with right vaglus impacted femoral neck fracture s/p ORIF 4/30 and minimally displaced distal radius metaphyseal fx treating nonoperatively. Right foot pain.    Recs:  --TTWB RLE  --NWB R wrist, okay to WB thru elbow with platform walker  --will check right foot xrays today  --PT/OT for mobilization   --continue loveonx and SCDs  --fu 2 weeks postop

## 2022-05-02 NOTE — CONSULTS
Physical Medicine and Rehabilitation Consultation              Date of initial consultation: 5/2/2022  Consulting provider: Vivien Mills MD   Reason for consultation: assess for acute inpatient rehab appropriateness  LOS: 3 Day(s)    Chief complaint: Ground-level fall    HPI: The patient is a 72 y.o. right hand dominant female with a past medical history of bilateral knee arthritis;  who presented on 4/29/2022  5:21 PM with injury sustained after a dog made her fall on her right side.  Patient was found to have right valgus impacted femoral neck fracture and right distal radial metaphyseal fracture.  Patient was seen by orthopedic surgeon Dr. Cassius Zuniga MD and taken to the OR for ORIF right femoral neck fracture on 4/29/2022.  Patient is toe-touch weightbearing RLE and NWB right wrist, cleared for platform walker use.  Postoperative course significant for anemia and thrombocytopenia.    The patient currently reports dizziness with standing, constipated, anxious to get home.  is very supportive and will install a ramp at their house. She can stay on the first floor of their house.     ROS  Pertinent positives are mentioned in the HPI, all others reviewed and are negative.    Social Hx:  2 SH  2 JAMES, 12 steps inside  With: Spouse and 2 large dogs    THERAPY:  Restrictions: TTWB RLE, platform WB RUE.  PT: Functional mobility   4/30: Unable to participate in gait, sit to stand at mod assist  5/2: walked 5' x2 with platform walker; min assist with transfers    OT: ADLs  4/30: Mod assist lower body dressing    SLP:   None    IMAGING:  Right hip x-ray 4/29/2022  Impacted transcervical right femoral neck fracture.    PROCEDURES:  Cassius Zuniga MD 4/29/2022  1.  Open treatment with internal fixation of right femoral neck fracture.  2.  Closed treatment without manipulation, right extra-articular distal radius   fracture.    PMH:  Past Medical History:   Diagnosis Date   • Patient denies medical problems   "      PSH:  Past Surgical History:   Procedure Laterality Date   • ORIF, HIP Right 4/29/2022    Procedure: ORIF, HIP, SPLINTING RIGHT WRIST;  Surgeon: Cassius Zuniga M.D.;  Location: SURGERY [facility];  Service: Orthopedics       FHX:  Non-pertinent to today's issues    Medications:  Current Facility-Administered Medications   Medication Dose   • HYDROcodone-acetaminophen (NORCO) 5-325 MG per tablet 1 Tablet  1 Tablet   • ketorolac (TORADOL) injection 15 mg  15 mg   • morphine 4 MG/ML injection 2-4 mg  2-4 mg   • methocarbamol (ROBAXIN) tablet 750 mg  750 mg   • acetaminophen (TYLENOL) tablet 1,000 mg  1,000 mg   • enoxaparin (Lovenox) inj 40 mg  40 mg       Allergies:  No Known Allergies      Physical Exam:  Vitals: /64   Pulse 65   Temp 36.6 °C (97.8 °F) (Temporal)   Resp 18   Ht 1.676 m (5' 6\")   Wt 88.5 kg (195 lb)   SpO2 94%   Gen: NAD  Head: NC/AT  Eyes/ Nose/ Mouth: moist mucous membranes  Cardio: RRR, good distal perfusion, warm extremities  Pulm: normal respiratory effort, no cyanosis   Abd: Soft NTND, negative borborygmi   Ext: No peripheral edema. No calf tenderness. No clubbing.    Mental status: answers questions appropriately follows commands  Speech: fluent, no aphasia or dysarthria    Motor:      Upper Extremity  Myotome R L   Shoulder flexion C5 5 5   Elbow flexion C5 4/5 5   Wrist extension C6 splint 5   Elbow extension C7 4/5 5   Finger flexion C8 2/5 5   Finger abduction T1 2/5 5     Lower Extremity Myotome R L   Hip flexion L2 3/5 4/5   Knee extension L3 4/5 5   Ankle dorsiflexion L4 4/5 5   Toe extension L5 4/5 5   Ankle plantarflexion S1 4/5 5       Sensory:   intact to light touch through out    DTRs:  Right  Left    Brachioradialis  2+  2+   Patella tendon  2+ 2+     Labs: Reviewed and significant for   Recent Labs     04/29/22  1943 05/01/22  0424 05/02/22  0406   RBC 3.92* 3.12* 3.01*   HEMOGLOBIN 12.7 10.2* 9.8*   HEMATOCRIT 39.1 30.4* 29.4*   PLATELETCT 69* 61* 62* "   PROTHROMBTM 13.2  --   --    APTT 26.6  --   --    INR 1.03  --   --      Recent Labs     04/29/22  1943 05/01/22  0424 05/02/22  0406   SODIUM 139 136 132*   POTASSIUM 4.0 4.0 4.1   CHLORIDE 105 103 100   CO2 20 25 25   GLUCOSE 112* 108* 106*   BUN 21 12 14   CREATININE 0.73 0.54 0.59   CALCIUM 9.3 8.7 8.8     Recent Results (from the past 24 hour(s))   TSH    Collection Time: 05/01/22  1:31 PM   Result Value Ref Range    TSH 1.820 0.380 - 5.330 uIU/mL   VITAMIN B12    Collection Time: 05/01/22  1:31 PM   Result Value Ref Range    Vitamin B12 -True Cobalamin 496 211 - 911 pg/mL   CBC WITHOUT DIFFERENTIAL    Collection Time: 05/02/22  4:06 AM   Result Value Ref Range    WBC 5.6 4.8 - 10.8 K/uL    RBC 3.01 (L) 4.20 - 5.40 M/uL    Hemoglobin 9.8 (L) 12.0 - 16.0 g/dL    Hematocrit 29.4 (L) 37.0 - 47.0 %    MCV 97.7 81.4 - 97.8 fL    MCH 32.6 27.0 - 33.0 pg    MCHC 33.3 (L) 33.6 - 35.0 g/dL    RDW 50.3 (H) 35.9 - 50.0 fL    Platelet Count 62 (L) 164 - 446 K/uL    MPV 9.9 9.0 - 12.9 fL   Renal Function Panel    Collection Time: 05/02/22  4:06 AM   Result Value Ref Range    Sodium 132 (L) 135 - 145 mmol/L    Potassium 4.1 3.6 - 5.5 mmol/L    Chloride 100 96 - 112 mmol/L    Co2 25 20 - 33 mmol/L    Glucose 106 (H) 65 - 99 mg/dL    Creatinine 0.59 0.50 - 1.40 mg/dL    Bun 14 8 - 22 mg/dL    Calcium 8.8 8.5 - 10.5 mg/dL    Phosphorus 2.8 2.5 - 4.5 mg/dL    Albumin 3.3 3.2 - 4.9 g/dL   ESTIMATED GFR    Collection Time: 05/02/22  4:06 AM   Result Value Ref Range    GFR (CKD-EPI) 97 >60 mL/min/1.73 m 2         ASSESSMENT:  Patient is a 72 y.o. female admitted with right hip fracture and right wrist fracture now s/p ORIF right hip on 4/29 with Dr. Zuniga.     [facility] Code / Diagnosis to Support: 0008.11 - Orthopaedic Disorders: Status Post Unilateral Hip Fracture    Rehabilitation: Impaired ADLs and mobility  Patient is a good candidate for inpatient rehab based on needs for PT, OT.  Patient will also benefit from family  training.  Patient has a good discharge situation which will be home with spouse.     Barriers to transfer include: Insurance authorization, TCCs to verify disposition, medical clearance and bed availability     All cases are subject to administrative review and recommendations may change    Additional Recommendations:  - Great candidate for IPR. Patient has deficits with mobility and ADLs secondary to her right hip and wrist fracture, now s/p ORIF with WB restrictions on her right leg and arm.   - spouse is very supportive, will install a ramp at their house, she can stay on the first floor.   - TCC to arrange transport to IPR tomorrow, pending medical clearance.     Anemia   - 9.8, slightly down from yesterday   - monitor     Thrombocytopenia   - Plt 62 - stable   - Monitor closely, must be above 50 for RRH    Constipation   - increase PRN bowel meds  - Miralax once now     Hyponatremia   - Na 132 today   - monitor closely   - consider salt tabs if sodium continues to drop     Pain control   - AVOID IV pain medication - had Toradol once this morning   - Continue with tylenol as first line and norco for breakthrough       Medical Complexity:  Anemia   Thrombocytopenia   Hyponatremia       DVT PPX: Lovenox       Thank you for allowing us to participate in the care of this patient.     Patient was seen for 83 minutes on unit/floor of which > 50% of time was spent on counseling and coordination of care regarding the above, including prognosis, risk reduction, benefits of treatment, and options for next stage of care.    Cameron Shah, DO   Physical Medicine and Rehabilitation     Please note that this dictation was created using voice recognition software. I have made every reasonable attempt to correct obvious errors, but there may be errors of grammar and possibly content that I did not discover before finalizing the note.

## 2022-05-02 NOTE — CARE PLAN
Problem: Mobility  Goal: Patient's capacity to carry out activities will improve  Outcome: Not Met     Problem: Pain - Standard  Goal: Alleviation of pain or a reduction in pain to the patient’s comfort goal  Outcome: Not Met     Problem: Knowledge Deficit - Standard  Goal: Patient and family/care givers will demonstrate understanding of plan of care, disease process/condition, diagnostic tests and medications  Outcome: Progressing     Problem: Fall Risk  Goal: Patient will remain free from falls  Outcome: Progressing   The patient is Stable - Low risk of patient condition declining or worsening    Shift Goals  Clinical Goals: up to chair for meals, pain management  Patient Goals: comfort, rest  Family Goals: Not present    Progress made toward(s) clinical / shift goals:  yes    Patient is not progressing towards the following goals:      Problem: Mobility  Goal: Patient's capacity to carry out activities will improve  Outcome: Not Met     Problem: Pain - Standard  Goal: Alleviation of pain or a reduction in pain to the patient’s comfort goal  Outcome: Not Met

## 2022-05-02 NOTE — DISCHARGE PLANNING
[facility] Transitional Care Coordination    Referral from:  Dr. Mills    Insurance Provider on Facesheet: MCR/AARP    Potential Rehab Diagnosis: TBD    Chart review indicates patient may have on going medical management and may have therapy needs to possibly meet inpatient rehab facility criteria with the goal of returning to community.    D/C support: TBD     Physiatry consultation forwarded per protocol.     Would appreciate updated [place] evals once appropriate.      Thank you for the referral.

## 2022-05-02 NOTE — PROGRESS NOTES
[facility] Medicine Daily Progress Note    Date of Service  5/2/2022    Chief Complaint  Carmita Miller is a 72 y.o. female admitted 4/29/2022 with   Chief Complaint   Patient presents with   • T-5000 GLF     Pt fell onto her R side after a dog caused her to fall onto her R wrist and R hip. Pt has R hip and R wrist pain. No obvious deformities, CMS intact         Hospital Course  This is a 72 y.o. female with no significant pmhx presented to ER on 4/29/2022 with status post fall landing on her right hip and right wrist.      Imaging X-rays done showed right valgus impacted femoral neck fracture and minimally displaced distal radius metaphyseal fracture.  Orthopedics was consulted, patient underwent  Open treatment with internal fixation of right femoral neck fracture; Closed treatment without manipulation, right extra-articular distal radius Fracture on 4/29. Per ortho,TTWB RLE and NWB R wrist, okay to WB thru elbow with platform walker.  PT/OT has evaluate the patient and recommend postacute, physiatry referral has been placed.  Medically cleared to be discharged to skilled vs Rehab.     Of note, she has thrombocytopenia; will obtain US-abdomen.    Interval events:    5/2:  -- No acute events overnight,vital sign  has been stable, heart rate 30-65.  Patient saturating well on room air.  States that her pain is well controlled.  --Sodium at 132, patient is alert and oriented and answering questions appropriately.  --Patient hemoglobin noted to be 9.8 , transfuse if less than 7  --- Platelet is noted to be low at 62, obtaining ultrasound of the abdomen; follow up   --PT and OT has evaluated the patient recommended postacute, physiatry/ snf referral has been in place.    I have personally seen and examined the patient at bedside. I discussed the plan of care with patient, bedside RN, charge RN and .    Consultants/Specialty  orthopedics    Code Status  Full Code    Disposition  Patient is medcally cleared   for discharge.   Anticipate discharge to to skilled nursing facility vs ehab  I have placed the appropriate orders for post-discharge needs.    Review of Systems  Review of Systems   Constitutional: Positive for malaise/fatigue. Negative for chills and fever.   HENT: Negative for congestion and sore throat.    Eyes: Negative for blurred vision and photophobia.   Respiratory: Negative for cough and shortness of breath.    Cardiovascular: Negative for chest pain, palpitations, orthopnea and claudication.   Gastrointestinal: Negative for abdominal pain, constipation, heartburn and vomiting.   Musculoskeletal: Positive for joint pain and myalgias.   Neurological: Negative for focal weakness and headaches.   Psychiatric/Behavioral: Negative for depression. The patient is not nervous/anxious.    All other systems reviewed and are negative.       Physical Exam  Temp:  [36.2 °C (97.1 °F)-36.6 °C (97.8 °F)] 36.6 °C (97.8 °F)  Pulse:  [62-76] 65  Resp:  [18] 18  BP: (100-119)/(48-64) 100/64  SpO2:  [92 %-94 %] 94 %    Physical Exam  Vitals and nursing note reviewed.   Constitutional:       Appearance: Normal appearance.   HENT:      Head: Normocephalic and atraumatic.   Eyes:      Extraocular Movements: Extraocular movements intact.      Pupils: Pupils are equal, round, and reactive to light.   Cardiovascular:      Rate and Rhythm: Normal rate.      Pulses: Normal pulses.   Pulmonary:      Effort: No respiratory distress.      Breath sounds: Normal breath sounds.   Abdominal:      General: Bowel sounds are normal.      Tenderness: There is no abdominal tenderness.   Musculoskeletal:      Cervical back: Neck supple.      Left lower leg: No edema.      Comments: Decreased ROM on Right side likely 2/2 pain   Does have surgical dressing on the Right Forearm   Skin:     General: Skin is warm.   Neurological:      Mental Status: She is alert and oriented to person, place, and time.      Cranial Nerves: No cranial nerve deficit.          Fluids    Intake/Output Summary (Last 24 hours) at 5/2/2022 1124  Last data filed at 5/1/2022 1524  Gross per 24 hour   Intake 120 ml   Output --   Net 120 ml       Laboratory  Recent Labs     04/29/22 1943 05/01/22 0424 05/02/22  0406   WBC 8.5 6.9 5.6   RBC 3.92* 3.12* 3.01*   HEMOGLOBIN 12.7 10.2* 9.8*   HEMATOCRIT 39.1 30.4* 29.4*   MCV 99.7* 97.4 97.7   MCH 32.4 32.7 32.6   MCHC 32.5* 33.6 33.3*   RDW 51.0* 50.2* 50.3*   PLATELETCT 69* 61* 62*   MPV 10.0 10.7 9.9     Recent Labs     04/29/22 1943 05/01/22 0424 05/02/22 0406   SODIUM 139 136 132*   POTASSIUM 4.0 4.0 4.1   CHLORIDE 105 103 100   CO2 20 25 25   GLUCOSE 112* 108* 106*   BUN 21 12 14   CREATININE 0.73 0.54 0.59   CALCIUM 9.3 8.7 8.8     Recent Labs     04/29/22 1943   APTT 26.6   INR 1.03               Imaging  DX-FOOT-COMPLETE 3+ RIGHT   Final Result         1.  No radiographic evidence of acute injury.      DX-PORTABLE FLUORO > 1 HOUR   Final Result      Portable fluoroscopy utilized for 1 minute 3.3 seconds.         INTERPRETING LOCATION: [address] [place] [address], [facility]      DX-HIP-COMPLETE - UNILATERAL 2+ RIGHT   Final Result      Digitized intraoperative radiograph is submitted for review.  This examination is not for diagnostic purpose but for guidance during a surgical procedure.      DX-KNEE 2- RIGHT   Final Result      1.  There is no acute fracture or malalignment of the right knee.      DX-FEMUR-2+ RIGHT   Final Result      Impacted transcervical right femoral neck fracture.      DX-PELVIS-1 OR 2 VIEWS   Final Result      1.  Impacted transcervical right femoral neck fracture.      DX-CHEST-PORTABLE (1 VIEW)   Final Result      No acute cardiac or pulmonary abnormalities are identified.      DX-WRIST-COMPLETE 3+ RIGHT   Final Result      Impacted, comminuted intra-articular distal radius fracture.           Assessment/Plan  * Fracture of femoral neck, right, closed (HCC)- (present on admission)  Assessment & Plan  Imaging  X-rays done showed right valgus impacted femoral neck fracture and minimally displaced distal radius metaphyseal fracture.   --  Orthopedics was consulted, patient underwent  Open treatment with internal fixation of right femoral neck fracture; Closed treatment without manipulation, right extra-articular distal radius   -- PT and OT recs post-acute; physiatry and SNF referral has been placed, pending evaluation.   -- DVt ppx    Medically cleared to be discharged     Hyponatremia  Assessment & Plan  At 132 , encourage po intake   Likely 2/2 hypovolumic hyponatremia    Anemia  Assessment & Plan  Monitor hemoglobin hematocrit, if less than 7, transfuse likely secondary to acute blood loss anemia    Thrombocytopenia (HCC)- (present on admission)  Assessment & Plan  At 62  Not actively bleeding   Pending US-abdomen    Macrocytosis- (present on admission)  Assessment & Plan   TSH/ Vitamin b12 wnl    Obesity (BMI 30-39.9)- (present on admission)  Assessment & Plan   on dietary and lifestyle modification once mental status improves     Closed fracture of distal end of right radius- (present on admission)  Assessment & Plan  Non op. Ortho following PT/OT  Pain control        VTE prophylaxis: Lovenox  I have performed a physical exam and reviewed and updated ROS and Plan today (5/2/2022). In review of yesterday's note (5/1/2022), there are no changes except as documented above.

## 2022-05-02 NOTE — CARE PLAN
The patient is Stable - Low risk of patient condition declining or worsening    Shift Goals  Clinical Goals: up to chair for meals, pain management  Patient Goals: comfort, rest  Family Goals: Not present    Progress made toward(s) clinical / shift goals:    Problem: Fall Risk  Goal: Patient will remain free from falls  Outcome: Progressing  Note: Safety precautions are in place including bed locked and in lowest position, upper bed rails up, bed alarm on, call light within reach, treaded socks on, tray table and personal belongings within reach.        Patient is not progressing towards the following goals:

## 2022-05-03 ENCOUNTER — APPOINTMENT (OUTPATIENT)
Dept: RADIOLOGY | Facility: MEDICAL CENTER | Age: 72
DRG: 481 | End: 2022-05-03
Attending: HOSPITALIST
Payer: MEDICARE

## 2022-05-03 ENCOUNTER — HOSPITAL ENCOUNTER (INPATIENT)
Facility: REHABILITATION | Age: 72
LOS: 8 days | DRG: 560 | End: 2022-05-11
Attending: PHYSICAL MEDICINE & REHABILITATION | Admitting: PHYSICAL MEDICINE & REHABILITATION
Payer: MEDICARE

## 2022-05-03 VITALS
OXYGEN SATURATION: 90 % | RESPIRATION RATE: 17 BRPM | SYSTOLIC BLOOD PRESSURE: 120 MMHG | WEIGHT: 195 LBS | HEART RATE: 59 BPM | HEIGHT: 66 IN | DIASTOLIC BLOOD PRESSURE: 56 MMHG | BODY MASS INDEX: 31.34 KG/M2 | TEMPERATURE: 97 F

## 2022-05-03 DIAGNOSIS — S72.001D HIP FRACTURE REQUIRING OPERATIVE REPAIR, RIGHT, CLOSED, WITH ROUTINE HEALING, SUBSEQUENT ENCOUNTER: ICD-10-CM

## 2022-05-03 DIAGNOSIS — S72.001D CLOSED FRACTURE OF NECK OF RIGHT FEMUR WITH ROUTINE HEALING, SUBSEQUENT ENCOUNTER: ICD-10-CM

## 2022-05-03 PROBLEM — D61.818 PANCYTOPENIA (HCC): Status: ACTIVE | Noted: 2022-05-01

## 2022-05-03 LAB
ERYTHROCYTE [DISTWIDTH] IN BLOOD BY AUTOMATED COUNT: 49.7 FL (ref 35.9–50)
HAV IGM SERPL QL IA: NORMAL
HBV CORE IGM SER QL: NORMAL
HBV SURFACE AG SER QL: NORMAL
HCT VFR BLD AUTO: 28.2 % (ref 37–47)
HCV AB SER QL: NORMAL
HGB BLD-MCNC: 9.3 G/DL (ref 12–16)
HIV 1+2 AB+HIV1 P24 AG SERPL QL IA: NORMAL
MCH RBC QN AUTO: 31.7 PG (ref 27–33)
MCHC RBC AUTO-ENTMCNC: 33 G/DL (ref 33.6–35)
MCV RBC AUTO: 96.2 FL (ref 81.4–97.8)
PLATELET # BLD AUTO: 66 K/UL (ref 164–446)
PMV BLD AUTO: 10.3 FL (ref 9–12.9)
RBC # BLD AUTO: 2.93 M/UL (ref 4.2–5.4)
WBC # BLD AUTO: 4.6 K/UL (ref 4.8–10.8)

## 2022-05-03 PROCEDURE — 0240U HCHG SARS-COV-2 COVID-19 NFCT DS RESP RNA 3 TRGT MIC: CPT

## 2022-05-03 PROCEDURE — 80074 ACUTE HEPATITIS PANEL: CPT

## 2022-05-03 PROCEDURE — A9270 NON-COVERED ITEM OR SERVICE: HCPCS | Performed by: HOSPITALIST

## 2022-05-03 PROCEDURE — 770010 HCHG ROOM/CARE - REHAB SEMI PRIVAT*

## 2022-05-03 PROCEDURE — 36415 COLL VENOUS BLD VENIPUNCTURE: CPT

## 2022-05-03 PROCEDURE — 700102 HCHG RX REV CODE 250 W/ 637 OVERRIDE(OP): Performed by: PHYSICAL MEDICINE & REHABILITATION

## 2022-05-03 PROCEDURE — A9270 NON-COVERED ITEM OR SERVICE: HCPCS | Performed by: PHYSICAL MEDICINE & REHABILITATION

## 2022-05-03 PROCEDURE — 85027 COMPLETE CBC AUTOMATED: CPT

## 2022-05-03 PROCEDURE — 76700 US EXAM ABDOM COMPLETE: CPT

## 2022-05-03 PROCEDURE — 700111 HCHG RX REV CODE 636 W/ 250 OVERRIDE (IP): Performed by: HOSPITALIST

## 2022-05-03 PROCEDURE — 700101 HCHG RX REV CODE 250: Performed by: PHYSICAL MEDICINE & REHABILITATION

## 2022-05-03 PROCEDURE — 99239 HOSP IP/OBS DSCHRG MGMT >30: CPT | Performed by: FAMILY MEDICINE

## 2022-05-03 PROCEDURE — 94760 N-INVAS EAR/PLS OXIMETRY 1: CPT

## 2022-05-03 PROCEDURE — 99223 1ST HOSP IP/OBS HIGH 75: CPT | Performed by: PHYSICAL MEDICINE & REHABILITATION

## 2022-05-03 PROCEDURE — 700102 HCHG RX REV CODE 250 W/ 637 OVERRIDE(OP): Performed by: HOSPITALIST

## 2022-05-03 PROCEDURE — 87389 HIV-1 AG W/HIV-1&-2 AB AG IA: CPT

## 2022-05-03 RX ORDER — ECHINACEA PURPUREA EXTRACT 125 MG
2 TABLET ORAL PRN
Status: DISCONTINUED | OUTPATIENT
Start: 2022-05-03 | End: 2022-05-11 | Stop reason: HOSPADM

## 2022-05-03 RX ORDER — ONDANSETRON 4 MG/1
4 TABLET, ORALLY DISINTEGRATING ORAL 4 TIMES DAILY PRN
Status: DISCONTINUED | OUTPATIENT
Start: 2022-05-03 | End: 2022-05-11 | Stop reason: HOSPADM

## 2022-05-03 RX ORDER — NAPROXEN 500 MG/1
500 TABLET ORAL 2 TIMES DAILY
Status: DISCONTINUED | OUTPATIENT
Start: 2022-05-03 | End: 2022-05-03

## 2022-05-03 RX ORDER — LANOLIN ALCOHOL/MO/W.PET/CERES
3 CREAM (GRAM) TOPICAL
Status: DISCONTINUED | OUTPATIENT
Start: 2022-05-03 | End: 2022-05-11 | Stop reason: HOSPADM

## 2022-05-03 RX ORDER — METHOCARBAMOL 750 MG/1
750 TABLET, FILM COATED ORAL 3 TIMES DAILY PRN
Status: DISCONTINUED | OUTPATIENT
Start: 2022-05-03 | End: 2022-05-09

## 2022-05-03 RX ORDER — NAPROXEN 500 MG/1
250 TABLET ORAL 2 TIMES DAILY
Status: DISCONTINUED | OUTPATIENT
Start: 2022-05-03 | End: 2022-05-09

## 2022-05-03 RX ORDER — ACETAMINOPHEN 500 MG
1000 TABLET ORAL EVERY 6 HOURS PRN
Status: DISCONTINUED | OUTPATIENT
Start: 2022-05-08 | End: 2022-05-09

## 2022-05-03 RX ORDER — METHOCARBAMOL 750 MG/1
750 TABLET, FILM COATED ORAL 3 TIMES DAILY PRN
Qty: 120 TABLET | Status: ON HOLD
Start: 2022-05-03 | End: 2022-05-10

## 2022-05-03 RX ORDER — ENOXAPARIN SODIUM 100 MG/ML
40 INJECTION SUBCUTANEOUS DAILY
Status: DISCONTINUED | OUTPATIENT
Start: 2022-05-04 | End: 2022-05-11 | Stop reason: HOSPADM

## 2022-05-03 RX ORDER — POLYVINYL ALCOHOL 14 MG/ML
1 SOLUTION/ DROPS OPHTHALMIC PRN
Status: DISCONTINUED | OUTPATIENT
Start: 2022-05-03 | End: 2022-05-11 | Stop reason: HOSPADM

## 2022-05-03 RX ORDER — POLYETHYLENE GLYCOL 3350 17 G/17G
1 POWDER, FOR SOLUTION ORAL DAILY
Status: DISCONTINUED | OUTPATIENT
Start: 2022-05-03 | End: 2022-05-03

## 2022-05-03 RX ORDER — ENOXAPARIN SODIUM 100 MG/ML
40 INJECTION SUBCUTANEOUS DAILY
Status: ON HOLD
Start: 2022-05-04 | End: 2022-05-10

## 2022-05-03 RX ORDER — POLYETHYLENE GLYCOL 3350 17 G/17G
1 POWDER, FOR SOLUTION ORAL 2 TIMES DAILY
Status: DISCONTINUED | OUTPATIENT
Start: 2022-05-03 | End: 2022-05-06

## 2022-05-03 RX ORDER — OMEPRAZOLE 20 MG/1
20 CAPSULE, DELAYED RELEASE ORAL DAILY
Status: DISCONTINUED | OUTPATIENT
Start: 2022-05-04 | End: 2022-05-11 | Stop reason: HOSPADM

## 2022-05-03 RX ORDER — ONDANSETRON 2 MG/ML
4 INJECTION INTRAMUSCULAR; INTRAVENOUS 4 TIMES DAILY PRN
Status: DISCONTINUED | OUTPATIENT
Start: 2022-05-03 | End: 2022-05-11 | Stop reason: HOSPADM

## 2022-05-03 RX ORDER — OXYCODONE HYDROCHLORIDE 5 MG/1
2.5-5 TABLET ORAL EVERY 4 HOURS PRN
Status: DISCONTINUED | OUTPATIENT
Start: 2022-05-03 | End: 2022-05-09

## 2022-05-03 RX ORDER — LIDOCAINE 50 MG/G
1 PATCH TOPICAL DAILY
Status: DISCONTINUED | OUTPATIENT
Start: 2022-05-03 | End: 2022-05-11 | Stop reason: HOSPADM

## 2022-05-03 RX ORDER — M-VIT,TX,IRON,MINS/CALC/FOLIC 27MG-0.4MG
1 TABLET ORAL
Status: DISCONTINUED | OUTPATIENT
Start: 2022-05-04 | End: 2022-05-11 | Stop reason: HOSPADM

## 2022-05-03 RX ORDER — ALUMINA, MAGNESIA, AND SIMETHICONE 2400; 2400; 240 MG/30ML; MG/30ML; MG/30ML
20 SUSPENSION ORAL
Status: DISCONTINUED | OUTPATIENT
Start: 2022-05-03 | End: 2022-05-11 | Stop reason: HOSPADM

## 2022-05-03 RX ORDER — ACETAMINOPHEN 500 MG
1000 TABLET ORAL 3 TIMES DAILY
Status: DISPENSED | OUTPATIENT
Start: 2022-05-03 | End: 2022-05-08

## 2022-05-03 RX ORDER — HYDROCODONE BITARTRATE AND ACETAMINOPHEN 5; 325 MG/1; MG/1
1 TABLET ORAL EVERY 4 HOURS PRN
Status: ON HOLD
Start: 2022-05-03 | End: 2022-05-10

## 2022-05-03 RX ADMIN — POLYETHYLENE GLYCOL 3350 1 PACKET: 17 POWDER, FOR SOLUTION ORAL at 05:32

## 2022-05-03 RX ADMIN — ENOXAPARIN SODIUM 40 MG: 40 INJECTION SUBCUTANEOUS at 05:34

## 2022-05-03 RX ADMIN — ACETAMINOPHEN 1000 MG: 500 TABLET, FILM COATED ORAL at 20:40

## 2022-05-03 RX ADMIN — METHOCARBAMOL 750 MG: 750 TABLET ORAL at 05:35

## 2022-05-03 RX ADMIN — NAPROXEN 250 MG: 500 TABLET ORAL at 20:40

## 2022-05-03 RX ADMIN — HYDROCODONE BITARTRATE AND ACETAMINOPHEN 1 TABLET: 5; 325 TABLET ORAL at 05:33

## 2022-05-03 RX ADMIN — OXYCODONE 5 MG: 5 TABLET ORAL at 12:21

## 2022-05-03 RX ADMIN — LIDOCAINE 1 PATCH: 50 PATCH TOPICAL at 12:19

## 2022-05-03 RX ADMIN — MAGNESIUM HYDROXIDE 30 ML: 400 SUSPENSION ORAL at 05:33

## 2022-05-03 RX ADMIN — ACETAMINOPHEN 1000 MG: 500 TABLET, FILM COATED ORAL at 14:36

## 2022-05-03 RX ADMIN — NAPROXEN 250 MG: 500 TABLET ORAL at 14:36

## 2022-05-03 RX ADMIN — POLYETHYLENE GLYCOL 3350 1 PACKET: 17 POWDER, FOR SOLUTION ORAL at 20:40

## 2022-05-03 ASSESSMENT — LIFESTYLE VARIABLES
CONSUMPTION TOTAL: NEGATIVE
HAVE PEOPLE ANNOYED YOU BY CRITICIZING YOUR DRINKING: NO
HAVE YOU EVER FELT YOU SHOULD CUT DOWN ON YOUR DRINKING: NO
HAVE PEOPLE ANNOYED YOU BY CRITICIZING YOUR DRINKING: NO
EVER_SMOKED: YES
EVER FELT BAD OR GUILTY ABOUT YOUR DRINKING: NO
ON A TYPICAL DAY WHEN YOU DRINK ALCOHOL HOW MANY DRINKS DO YOU HAVE: 0
HAVE YOU EVER FELT YOU SHOULD CUT DOWN ON YOUR DRINKING: NO
AVERAGE NUMBER OF DAYS PER WEEK YOU HAVE A DRINK CONTAINING ALCOHOL: 2
EVER HAD A DRINK FIRST THING IN THE MORNING TO STEADY YOUR NERVES TO GET RID OF A HANGOVER: NO
TOTAL SCORE: 0
TOTAL SCORE: 0
ALCOHOL_USE: YES
EVER FELT BAD OR GUILTY ABOUT YOUR DRINKING: NO
AVERAGE NUMBER OF DAYS PER WEEK YOU HAVE A DRINK CONTAINING ALCOHOL: 0
EVER HAD A DRINK FIRST THING IN THE MORNING TO STEADY YOUR NERVES TO GET RID OF A HANGOVER: NO
TOTAL SCORE: 0
TOTAL SCORE: 0
ON A TYPICAL DAY WHEN YOU DRINK ALCOHOL HOW MANY DRINKS DO YOU HAVE: 2
HOW MANY TIMES IN THE PAST YEAR HAVE YOU HAD 5 OR MORE DRINKS IN A DAY: 0
ALCOHOL_USE: NO
CONSUMPTION TOTAL: NEGATIVE
TOTAL SCORE: 0
HOW MANY TIMES IN THE PAST YEAR HAVE YOU HAD 5 OR MORE DRINKS IN A DAY: 0
TOTAL SCORE: 0

## 2022-05-03 ASSESSMENT — PATIENT HEALTH QUESTIONNAIRE - PHQ9
2. FEELING DOWN, DEPRESSED, IRRITABLE, OR HOPELESS: NOT AT ALL
1. LITTLE INTEREST OR PLEASURE IN DOING THINGS: NOT AT ALL
2. FEELING DOWN, DEPRESSED, IRRITABLE, OR HOPELESS: NOT AT ALL
SUM OF ALL RESPONSES TO PHQ9 QUESTIONS 1 AND 2: 0
1. LITTLE INTEREST OR PLEASURE IN DOING THINGS: NOT AT ALL
SUM OF ALL RESPONSES TO PHQ9 QUESTIONS 1 AND 2: 0
1. LITTLE INTEREST OR PLEASURE IN DOING THINGS: NOT AT ALL
2. FEELING DOWN, DEPRESSED, IRRITABLE, OR HOPELESS: NOT AT ALL
SUM OF ALL RESPONSES TO PHQ9 QUESTIONS 1 AND 2: 0

## 2022-05-03 ASSESSMENT — PAIN DESCRIPTION - PAIN TYPE
TYPE: ACUTE PAIN;SURGICAL PAIN
TYPE: ACUTE PAIN;SURGICAL PAIN
TYPE: ACUTE PAIN

## 2022-05-03 ASSESSMENT — FIBROSIS 4 INDEX: FIB4 SCORE: 5.06

## 2022-05-03 NOTE — DISCHARGE PLANNING
JONNATHAN Meehan is looking into benefits.  Msg placed to Dr. Lomeli-seeking medical clearance.  Spoke with Anthony, spouse regarding [facility] and D/C resources/support.  He is agreeable with an admission.  They live in a 2LV home with 2ST to enter.  BD/BR are on the entry level.  Anthony is retired and will provide 24/7.  He is looking into having a ramp built.      0943-Per JONNATHAN Meehan MCR/AARP are active.  Case is under review by Dr. Ugarte.     1007-Dr. gUarte has accepted.  Transport has been arranged via GMT between [id number]. Msg placed to Dr. Lomeli, Gillian CM & Ninoska BATISTA. Anthony spouse & Jay JAEGERN are aware.

## 2022-05-03 NOTE — DISCHARGE INSTRUCTIONS
Discharge Instructions    Discharged to Rehab by medical transportation with escort. Discharged via wheelchair, hospital escort: Yes.  Special equipment needed: Not Applicable    Be sure to schedule a follow-up appointment with your primary care doctor or any specialists as instructed.  Follow up with Dr. Zuniga within two weeks  Take medications as prescribed  For any questions or concerns please contact surgeon or PCP     Discharge Plan:        I understand that a diet low in cholesterol, fat, and sodium is recommended for good health. Unless I have been given specific instructions below for another diet, I accept this instruction as my diet prescription.   Other diet: Regular diet    Special Instructions: None    · Is patient discharged on Warfarin / Coumadin?   No       Hip Fracture Treated With ORIF, Care After  This sheet gives you information about how to care for yourself after your procedure. Your health care provider may also give you more specific instructions. If you have problems or questions, contact your health care provider.  What can I expect after the procedure?  After the procedure, it is common to have:  · Pain. You will be given medicines to treat this.  · Swelling.  · Difficulty walking.  · Some redness or bruising around the incision.  · A small amount of fluid or blood from the incision.  Follow these instructions at home:  Medicines  · Take over-the-counter and prescription medicines only as told by your health care provider.  · You may be given a blood thinner to take for up to six weeks. This will help reduce the risk of developing a blood clot. It is important to use this medicine exactly as directed.  · You may be given calcium and vitamin D supplements to strengthen your bones.  · If you are taking prescription pain medicine, take actions to prevent or treat constipation. Your health care provider may recommend that you:  ? Drink enough fluid to keep your urine pale yellow.  ? Eat  foods that are high in fiber, such as fresh fruits and vegetables, whole grains, and beans.  ? Limit foods that are high in fat and processed sugars, such as fried or sweet foods.  ? Take an over-the-counter or prescription medicine for constipation.  Bathing  · Do not take baths, swim, or use a hot tub until your health care provider approves. Ask your health care provider if you can take showers. You may only be allowed to take sponge baths.  · Keep the bandage (dressing) dry until your health care provider says it can be removed.  Incision care    · Follow instructions from your health care provider about how to take care of your incision. Make sure you:  ? Wash your hands with soap and water before you change your dressing. If soap and water are not available, use hand .  ? Change your dressing as told by your health care provider.  ? Leave stitches (sutures), skin glue, or adhesive strips in place. These skin closures may need to stay in place for 2 weeks or longer. If adhesive strip edges start to loosen and curl up, you may trim the loose edges. Do not remove adhesive strips completely unless your health care provider tells you to do that.  · Check your incision area every day for signs of infection. Check for:  ? More redness, swelling, or pain.  ? More fluid or blood.  ? Warmth.  ? Pus or a bad smell.  Managing pain, stiffness, and swelling    · If directed, put ice on the affected area to prevent pain and swelling.  ? Put ice in a plastic bag.  ? Place a towel between your skin and the bag.  ? Leave the ice on for 20 minutes, 2-3 times a day.  · Move your toes often to avoid stiffness and to lessen swelling.  · Raise (elevate) your leg above the level of your heart while you are sitting or lying down. To do this, try putting a few pillows under your leg.  Activity    · Return to your normal activities as told by your health care provider. Ask your health care provider what activities are safe for  you.  · Do exercises as told by your health care provider or physical therapist. This will help make your hip stronger and help you recover more quickly.  · Do not use your injured limb to support (bear) your body weight until your health care provider says that you can. Follow weight-bearing restrictions as told. Use crutches or other devices to help you move around (assistive devices) as directed.  · You may feel most comfortable using a raised surface when sitting on the toilet or in a chair.  · Consider using a toilet seat riser over the toilet for comfort.  Driving  · Do not drive or use heavy machinery while taking prescription pain medicine.  · Ask your health care provider when it is safe for you to drive.  General instructions  · Wear compression stockings as told by your health care provider. These stockings help to prevent blood clots and reduce swelling in your legs.  · Do not use any products that contain nicotine or tobacco, such as cigarettes and e-cigarettes. These can delay bone healing. If you need help quitting, ask your health care provider.  · Keep all follow-up visits as told by your health care provider. This is important. This may include visits for:  ? Physical therapy.  ? Screening for osteoporosis. Osteoporosis is thinning and loss of density in your bones.  Contact a health care provider if you:  · Have a fever.  · Have pain that is not helped with medicine.  · Have more redness, swelling, or pain at your incision area.  · Have more fluid or blood coming from your incision or leaking through your dressing.  · Notice that your incision feels warm to the touch.  · Have pus or a bad smell coming from your incision area.  Get help right away if you:  · Notice that the edges of your incision have come apart after the sutures or staples have been removed.  · Have pain, warmth, or tenderness in the back of your lower leg (calf).  · Have tingling or numbness in your leg.  · Have a pale and cold  leg.  · Have trouble breathing.  · Have chest pain.  Summary  · After the procedure, it is common to have some pain and swelling.  · Take pain medicines as directed by your health care provider. Icing may also help with pain control.  · Contact your health care provider if you have signs of infection, severe pain, or more fluid or blood coming from your incision.  This information is not intended to replace advice given to you by your health care provider. Make sure you discuss any questions you have with your health care provider.  Document Released: 07/15/2015 Document Revised: 09/07/2019 Document Reviewed: 01/28/2019  [employer] Patient Education © 2020 [employer]    Enoxaparin injection  What is this medicine?  ENOXAPARIN (ee nox a PA rin) is used after knee, hip, or abdominal surgeries to prevent blood clotting. It is also used to treat existing blood clots in the lungs or in the veins.  This medicine may be used for other purposes; ask your health care provider or pharmacist if you have questions.  COMMON BRAND NAME(S): Lovenox  What should I tell my health care provider before I take this medicine?  They need to know if you have any of these conditions:  · bleeding disorders, hemorrhage, or hemophilia  · infection of the heart or heart valves  · kidney or liver disease  · previous stroke  · prosthetic heart valve  · recent surgery or delivery of a baby  · ulcer in the stomach or intestine, diverticulitis, or other bowel disease  · an unusual or allergic reaction to enoxaparin, heparin, pork or pork products, other medicines, foods, dyes, or preservatives  · pregnant or trying to get pregnant  · breast-feeding  How should I use this medicine?  This medicine is for injection under the skin. It is usually given by a health-care professional. You or a family member may be trained on how to give the injections. If you are to give yourself injections, make sure you understand how to use the syringe, measure the  dose if necessary, and give the injection. To avoid bruising, do not rub the site where this medicine has been injected. Do not take your medicine more often than directed. Do not stop taking except on the advice of your doctor or health care professional.  Make sure you receive a puncture-resistant container to dispose of the needles and syringes once you have finished with them. Do not reuse these items. Return the container to your doctor or health care professional for proper disposal.  Talk to your pediatrician regarding the use of this medicine in children. Special care may be needed.  Overdosage: If you think you have taken too much of this medicine contact a poison control center or emergency room at once.  NOTE: This medicine is only for you. Do not share this medicine with others.  What if I miss a dose?  If you miss a dose, take it as soon as you can. If it is almost time for your next dose, take only that dose. Do not take double or extra doses.  What may interact with this medicine?  · aspirin and aspirin-like medicines  · certain medicines that treat or prevent blood clots  · dipyridamole  · NSAIDs, medicines for pain and inflammation, like ibuprofen or naproxen  This list may not describe all possible interactions. Give your health care provider a list of all the medicines, herbs, non-prescription drugs, or dietary supplements you use. Also tell them if you smoke, drink alcohol, or use illegal drugs. Some items may interact with your medicine.  What should I watch for while using this medicine?  Visit your healthcare professional for regular checks on your progress. You may need blood work done while you are taking this medicine. Your condition will be monitored carefully while you are receiving this medicine. It is important not to miss any appointments.  If you are going to need surgery or other procedure, tell your healthcare professional that you are using this medicine.  Using this medicine for a  long time may weaken your bones and increase the risk of bone fractures.  Avoid sports and activities that might cause injury while you are using this medicine. Severe falls or injuries can cause unseen bleeding. Be careful when using sharp tools or knives. Consider using an electric razor. Take special care brushing or flossing your teeth. Report any injuries, bruising, or red spots on the skin to your healthcare professional.  Wear a medical ID bracelet or chain. Carry a card that describes your disease and details of your medicine and dosage times.  What side effects may I notice from receiving this medicine?  Side effects that you should report to your doctor or health care professional as soon as possible:  · allergic reactions like skin rash, itching or hives, swelling of the face, lips, or tongue  · bone pain  · signs and symptoms of bleeding such as bloody or black, tarry stools; red or dark-brown urine; spitting up blood or brown material that looks like coffee grounds; red spots on the skin; unusual bruising or bleeding from the eye, gums, or nose  · signs and symptoms of a blood clot such as chest pain; shortness of breath; pain, swelling, or warmth in the leg  · signs and symptoms of a stroke such as changes in vision; confusion; trouble speaking or understanding; severe headaches; sudden numbness or weakness of the face, arm or leg; trouble walking; dizziness; loss of coordination  Side effects that usually do not require medical attention (report to your doctor or health care professional if they continue or are bothersome):  · hair loss  · pain, redness, or irritation at site where injected  This list may not describe all possible side effects. Call your doctor for medical advice about side effects. You may report side effects to FDA at 2-200-FDA-8550.  Where should I keep my medicine?  Keep out of the reach of children.  Store at room temperature between 15 and 30 degrees C (59 and 86 degrees F). Do  not freeze. If your injections have been specially prepared, you may need to store them in the refrigerator. Ask your pharmacist. Throw away any unused medicine after the expiration date.  NOTE: This sheet is a summary. It may not cover all possible information. If you have questions about this medicine, talk to your doctor, pharmacist, or health care provider.  © 2020 [employer] (2018-12-13 11:25:34)  Acetaminophen; Hydrocodone tablets or capsules  What is this medicine?  ACETAMINOPHEN; HYDROCODONE (a set a CEASAR sergio fen; luis droe KOE done) is a pain reliever. It is used to treat moderate to severe pain.  This medicine may be used for other purposes; ask your health care provider or pharmacist if you have questions.  COMMON BRAND NAME(S): Anexsia, Bancap HC, Ceta-Plus, Co-Gesic, Comfortpak, Dolagesic, Dolorex Forte, DuoCet, Hydrocet, Hydrogesic, Lorcet, Lorcet HD, Lorcet Plus, Lortab, Margesic H, Maxidone, Norco, Polygesic, Stagesic, Vanacet, Verdrocet, Vicodin, Vicodin ES, Vicodin HP, Xodol, Zydone  What should I tell my health care provider before I take this medicine?  They need to know if you have any of these conditions:  · brain tumor  · Crohn's disease, inflammatory bowel disease, or ulcerative colitis  · drug abuse or addiction  · head injury  · heart or circulation problems  · if you often drink alcohol  · kidney disease or problems going to the bathroom  · liver disease  · lung disease, asthma, or breathing problems  · an unusual or allergic reaction to acetaminophen, hydrocodone, other opioid analgesics, other medicines, foods, dyes, or preservatives  · pregnant or trying to get pregnant  · breast-feeding  How should I use this medicine?  Take this medicine by mouth with a glass of water. Follow the directions on the prescription label. You can take it with or without food. If it upsets your stomach, take it with food. Do not take your medicine more often than directed.  A special MedGuide will  be given to you by the pharmacist with each prescription and refill. Be sure to read this information carefully each time.  Talk to your pediatrician regarding the use of this medicine in children. Special care may be needed.  Overdosage: If you think you have taken too much of this medicine contact a poison control center or emergency room at once.  NOTE: This medicine is only for you. Do not share this medicine with others.  What if I miss a dose?  If you miss a dose, take it as soon as you can. If it is almost time for your next dose, take only that dose. Do not take double or extra doses.  What may interact with this medicine?  This medicine may interact with the following medications:  · alcohol  · antiviral medicines for HIV or AIDS  · atropine  · antihistamines for allergy, cough and cold  · certain antibiotics like erythromycin, clarithromycin  · certain medicines for anxiety or sleep  · certain medicines for bladder problems like oxybutynin, tolterodine  · certain medicines for depression like amitriptyline, fluoxetine, sertraline  · certain medicines for fungal infections like ketoconazole and itraconazole  · certain medicines for Parkinson's disease like benztropine, trihexyphenidyl  · certain medicines for seizures like carbamazepine, phenobarbital, phenytoin, primidone  · certain medicines for stomach problems like dicyclomine, hyoscyamine  · certain medicines for travel sickness like scopolamine  · general anesthetics like halothane, isoflurane, methoxyflurane, propofol  · ipratropium  · local anesthetics like lidocaine, pramoxine, tetracaine  · MAOIs like Carbex, Eldepryl, Marplan, Nardil, and Parnate  · medicines that relax muscles for surgery  · other medicines with acetaminophen  · other narcotic medicines for pain or cough  · phenothiazines like chlorpromazine, mesoridazine, prochlorperazine, thioridazine  · rifampin  This list may not describe all possible interactions. Give your health care  provider a list of all the medicines, herbs, non-prescription drugs, or dietary supplements you use. Also tell them if you smoke, drink alcohol, or use illegal drugs. Some items may interact with your medicine.  What should I watch for while using this medicine?  Tell your doctor or health care professional if your pain does not go away, if it gets worse, or if you have new or a different type of pain. You may develop tolerance to the medicine. Tolerance means that you will need a higher dose of the medicine for pain relief. Tolerance is normal and is expected if you take the medicine for a long time.  Do not suddenly stop taking your medicine because you may develop a severe reaction. Your body becomes used to the medicine. This does NOT mean you are addicted. Addiction is a behavior related to getting and using a drug for a non-medical reason. If you have pain, you have a medical reason to take pain medicine. Your doctor will tell you how much medicine to take. If your doctor wants you to stop the medicine, the dose will be slowly lowered over time to avoid any side effects.  There are different types of narcotic medicines (opiates). If you take more than one type at the same time or if you are taking another medicine that also causes drowsiness, you may have more side effects. Give your health care provider a list of all medicines you use. Your doctor will tell you how much medicine to take. Do not take more medicine than directed. Call emergency for help if you have problems breathing or unusual sleepiness.  Do not take other medicines that contain acetaminophen with this medicine. Always read labels carefully. If you have questions, ask your doctor or pharmacist.  If you take too much acetaminophen get medical help right away. Too much acetaminophen can be very dangerous and cause liver damage. Even if you do not have symptoms, it is important to get help right away.  You may get drowsy or dizzy. Do not drive,  use machinery, or do anything that needs mental alertness until you know how this medicine affects you. Do not stand or sit up quickly, especially if you are an older patient. This reduces the risk of dizzy or fainting spells. Alcohol may interfere with the effect of this medicine. Avoid alcoholic drinks.  The medicine will cause constipation. Try to have a bowel movement at least every 2 to 3 days. If you do not have a bowel movement for 3 days, call your doctor or health care professional.  Your mouth may get dry. Chewing sugarless gum or sucking hard candy, and drinking plenty of water may help. Contact your doctor if the problem does not go away or is severe.  What side effects may I notice from receiving this medicine?  Side effects that you should report to your doctor or health care professional as soon as possible:  · allergic reactions like skin rash, itching or hives, swelling of the face, lips, or tongue  · breathing problems  · confusion  · redness, blistering, peeling or loosening of the skin, including inside the mouth  · signs and symptoms of low blood pressure like dizziness; feeling faint or lightheaded, falls; unusually weak or tired  · trouble passing urine or change in the amount of urine  · yellowing of the eyes or skin  Side effects that usually do not require medical attention (report to your doctor or health care professional if they continue or are bothersome):  · constipation  · dry mouth  · nausea, vomiting  · tiredness  This list may not describe all possible side effects. Call your doctor for medical advice about side effects. You may report side effects to FDA at 9-714-FDA-2823.  Where should I keep my medicine?  Keep out of the reach of children. This medicine can be abused. Keep your medicine in a safe place to protect it from theft. Do not share this medicine with anyone. Selling or giving away this medicine is dangerous and against the law.  Store at room temperature between 15 and  30 degrees C (59 and 86 degrees F).  This medicine may cause harm and death if it is taken by other adults, children, or pets. Return medicine that has not been used to an official disposal site. Contact the [facility] at [phone] or your [facility] government to find a site. If you cannot return the medicine, flush it down the toilet. Do not use the medicine after the expiration date.  NOTE: This sheet is a summary. It may not cover all possible information. If you have questions about this medicine, talk to your doctor, pharmacist, or health care provider.  © 2020 [employer] (2018-04-24 12:44:49)  Methocarbamol tablets  What is this medicine?  METHOCARBAMOL (meth oh LORENZO ba mole) helps to relieve pain and stiffness in muscles caused by strains, sprains, or other injury to your muscles.  This medicine may be used for other purposes; ask your health care provider or pharmacist if you have questions.  COMMON BRAND NAME(S): Robaxin  What should I tell my health care provider before I take this medicine?  They need to know if you have any of these conditions:  · kidney disease  · seizures  · an unusual or allergic reaction to methocarbamol, other medicines, foods, dyes, or preservatives  · pregnant or trying to get pregnant  · breast-feeding  How should I use this medicine?  Take this medicine by mouth with a full glass of water. Follow the directions on the prescription label. Take your medicine at regular intervals. Do not take your medicine more often than directed.  Talk to your pediatrician regarding the use of this medicine in children. Special care may be needed.  Overdosage: If you think you have taken too much of this medicine contact a poison control center or emergency room at once.  NOTE: This medicine is only for you. Do not share this medicine with others.  What if I miss a dose?  If you miss a dose, take it as soon as you can. If it is almost time for your next dose, take only the next dose.  Do not take double or extra doses.  What may interact with this medicine?  Do not take this medication with any of the following medicines:  · narcotic medicines for cough  This medicine may also interact with the following medications:  · alcohol  · antihistamines for allergy, cough and cold  · certain medicines for anxiety or sleep  · certain medicines for depression like amitriptyline, fluoxetine, sertraline  · certain medicines for seizures like phenobarbital, primidone  · cholinesterase inhibitors like neostigmine, ambenonium, and pyridostigmine bromide  · general anesthetics like halothane, isoflurane, methoxyflurane, propofol  · local anesthetics like lidocaine, pramoxine, tetracaine  · medicines that relax muscles for surgery  · narcotic medicines for pain  · phenothiazines like chlorpromazine, mesoridazine, prochlorperazine, thioridazine  This list may not describe all possible interactions. Give your health care provider a list of all the medicines, herbs, non-prescription drugs, or dietary supplements you use. Also tell them if you smoke, drink alcohol, or use illegal drugs. Some items may interact with your medicine.  What should I watch for while using this medicine?  Tell your doctor or health care professional if your symptoms do not start to get better or if they get worse.  You may get drowsy or dizzy. Do not drive, use machinery, or do anything that needs mental alertness until you know how this medicine affects you. Do not stand or sit up quickly, especially if you are an older patient. This reduces the risk of dizzy or fainting spells. Alcohol may interfere with the effect of this medicine. Avoid alcoholic drinks.  If you are taking another medicine that also causes drowsiness, you may have more side effects. Give your health care provider a list of all medicines you use. Your doctor will tell you how much medicine to take. Do not take more medicine than directed. Call emergency for help if you  have problems breathing or unusual sleepiness.  What side effects may I notice from receiving this medicine?  Side effects that you should report to your doctor or health care professional as soon as possible:  · allergic reactions like skin rash, itching or hives, swelling of the face, lips, or tongue  · breathing problems  · confusion  · seizures  · unusually weak or tired  Side effects that usually do not require medical attention (report to your doctor or health care professional if they continue or are bothersome):  · dizziness  · headache  · metallic taste  · tiredness  · upset stomach  This list may not describe all possible side effects. Call your doctor for medical advice about side effects. You may report side effects to FDA at 8-362-GBN-8369.  Where should I keep my medicine?  Keep out of the reach of children.  Store at room temperature between 20 and 25 degrees C (68 and 77 degrees F). Keep container tightly closed. Throw away any unused medicine after the expiration date.  NOTE: This sheet is a summary. It may not cover all possible information. If you have questions about this medicine, talk to your doctor, pharmacist, or health care provider.  © 2020 Elsevier/Gold Standard (2016-09-27 13:11:54)    Depression / Suicide Risk    As you are discharged from this [facility] facility, it is important to learn how to keep safe from harming yourself.    Recognize the warning signs:  · Abrupt changes in personality, positive or negative- including increase in energy   · Giving away possessions  · Change in eating patterns- significant weight changes-  positive or negative  · Change in sleeping patterns- unable to sleep or sleeping all the time   · Unwillingness or inability to communicate  · Depression  · Unusual sadness, discouragement and loneliness  · Talk of wanting to die  · Neglect of personal appearance   · Rebelliousness- reckless behavior  · Withdrawal from people/activities they love  · Confusion-  inability to concentrate     If you or a loved one observes any of these behaviors or has concerns about self-harm, here's what you can do:  · Talk about it- your feelings and reasons for harming yourself  · Remove any means that you might use to hurt yourself (examples: pills, rope, extension cords, firearm)  · Get professional help from the community (Mental Health, Substance Abuse, psychological counseling)  · Do not be alone:Call your Safe Contact- someone whom you trust who will be there for you.  · Call your local CRISIS HOTLINE 333-2796 or 290-390-5890  · Call your local Children's Mobile Crisis Response Team [place] (948) 498-0688 or www.eReplicant  · Call the toll free National Suicide Prevention Hotlines   · National Suicide Prevention Lifeline 353-482-SXQE (4872)  · National Hope Line Network 800-SUICIDE (748-0237)

## 2022-05-03 NOTE — H&P
REHABILITATION HISTORY AND PHYSICAL/POST ADMISSION PHYSICAL EVALUATION    Date of Admission: 5/3/2022    [facility] Code / Diagnosis to Support: 0008.4 - Orthopaedic Disorders: Status Post Major Multiple Fractures  Etiologic Diagnosis: Fracture of femoral neck, right, closed (HCC)    CC: right wrist, hip and foot pain    This history was prepared after interviewing the patient and reviewing the patient's chart on [facility].    HPI:  The patient is a 72 y.o. female with a past medical history of bilateral knee arthritis; now admitted for acute inpatient rehabilitation with severe functional debility secondary to multiple orthopedic fractures. Per documentation, patient presented to [facility] on 4/29/2022  5:21 PM for pain after fall on her right side.      Patient was found to have:   no acute fracture or malalignment of the right knee  impacted transcervical right femoral neck fracture  old right healed pubic rami fractures with bone remodeling  right distal radial metaphyseal fracture    She underwent the following procedures on 4/29/2022 by Dr. Cassius Zuniga MD:  Open treatment with internal fixation of right femoral neck fracture.  Closed treatment without manipulation, right extra-articular distal radius   fracture.  toe-touch weightbearing of right lower limb and nonweight bearing of right wrist (ok for platform walker)     Recovery was complicated by:  -Thrombocytopenia: Abdominal ultrasound with no acute abnormalities  -Dizziness with standing  -Constipation  -Postoperative anemia    Patient was evaluated by Rehab Medicine physician and Physical Therapy and Occupational Therapy and determined to be appropriate for acute inpatient rehab and was transferred to [facility] on 5/3/[facility]22.    Patient is.  Postoperative course significant for anemia and thrombocytopenia.     On admission the patient reports icing has been helping the pain. She was receiving Toradol at [facility] and asking for  an antiinflammatory to avoid using narcotics. Last BM 4/29. Urinating well. Walking has been difficult due to the weight bearing restrictions of her right leg     Goals for rehabilitation include:  improving independence and pain, reducing caregiver burden, and returning home safely.    PSYCHOSOCIAL HISTORY:  Pre-mobidly, the patient lives with spouse in a two level Home with  2 stairs to enter.  Spouse plans to build ramp for entry.    With this acute therapeutic intervention, this patient hopes to improve her functional status, and return to independent living with the supportive care of spouse.    REVIEW OF SYSTEMS:     Constitutional: denies fevers and chills  Eyes: denies change in vision  Ears, nose, mouth, throat: denies sore throat  Cardiovascular: denies palpitations  Respiratory: denies respiratory discomfort  Gastrointestinal: last BM 4/29  Genitourinary: continent  Musculoskeletal: admits to pain at surgical site as well as right wrist and right ankle  Integumentary: denies pressure ulcer, has incision site from surgery  Neurological: denies spasms, denies burning or shooting pain, denies headaches, denies weakness in arms / legs  Psychiatric: denies change in mood  Endocrine: denies diabetes mellitus  Hematologic/lymphatic: denies history of blood disorders  Allergic/immunologic: has the following allergies - Patient has no known allergies.    PMH:  Past Medical History:   Diagnosis Date   • Patient denies medical problems        PSH:  Past Surgical History:   Procedure Laterality Date   • ORIF, HIP Right 4/29/2022    Procedure: ORIF, HIP, SPLINTING RIGHT WRIST;  Surgeon: Cassius Zuniga M.D.;  Location: SURGERY [facility];  Service: Orthopedics       FAMILY HISTORY:  No family history     LEVEL OF FUNCTION PRIOR TO DISABILTY:  Independent with ADLs and mobility without an assistive device    LEVEL OF FUNCTION PRIOR TO ADMISSION to [facility]:  Cognition    Level of Consciousness  Alert   Comments motivated   Balance   Sitting Balance (Static) Fair   Sitting Balance (Dynamic) Fair   Standing Balance (Static) Poor +   Standing Balance (Dynamic) Poor +   Weight Shift Sitting Fair   Weight Shift Standing Poor   Skilled Intervention Tactile Cuing;Verbal Cuing;Sequencing   Comments PFWW   Bed Mobility    Comments not observed this session, receieved EOB with PT   Activities of Daily Living   Eating Modified Independent   Grooming Supervision;Seated   Upper Body Dressing Minimal Assist   Toileting Minimal Assist   How much help from another person does the patient currently need...   Putting on and taking off regular lower body clothing? 3   Bathing (including washing, rinsing, and drying)? 3   Toileting, which includes using a toilet, bedpan, or urinal? 3   Putting on and taking off regular upper body clothing? 3   Taking care of personal grooming such as brushing teeth? 3   Eating meals? 4   6 Clicks Daily Activity Score 19   Functional Mobility   Sit to Stand Minimal Assist   Bed, Chair, Wheelchair Transfer Minimal Assist   Tub / Shower Transfers Minimal Assist   Skilled Intervention Verbal Cuing;Tactile Cuing;Sequencing   Comments repeated cues required for hand placement to facilitate sit <> stand   Activity Tolerance   Sitting in Chair 20+min   Sitting Edge of Bed functional   Standing 4min at a time     Cognition    Level of Consciousness Alert   Comments cooperative   Other Treatments   Other Treatments Provided spoke with  on speaker phone and encoutraged ramp and WC   Balance   Sitting Balance (Static) Fair   Sitting Balance (Dynamic) Fair   Standing Balance (Static) Poor +   Standing Balance (Dynamic) Poor +   Weight Shift Sitting Fair   Weight Shift Standing Poor   Skilled Intervention Verbal Cuing;Compensatory Strategies   Comments PFWW   Gait Analysis   Gait Level Of Assist Minimal Assist   Assistive Device Platform Walker   Distance (Feet) 5   # of Times Distance was Traveled  2   Deviation Antalgic;Step To   # of Stairs Climbed 0   Weight Bearing Status TTWB RLE and PWB R UE   Skilled Intervention Verbal Cuing;Compensatory Strategies   Comments demonstration required for gait   Bed Mobility    Comments received in recliner and left EOB with OT for handoff   Functional Mobility   Sit to Stand Minimal Assist   Bed, Chair, Wheelchair Transfer Minimal Assist   Transfer Method Stand Step   Mobility in room with Platform  FWW   Skilled Intervention Verbal Cuing;Compensatory Strategies;Sequencing     CURRENT LEVEL OF FUNCTION:   Same as level of function prior to admission to [facility]    MEDICATIONS:  Current Facility-Administered Medications   Medication Dose   • [START ON 5/4/2022] enoxaparin (Lovenox) inj 40 mg  40 mg   • methocarbamol (ROBAXIN) tablet 750 mg  750 mg   • Respiratory Therapy Consult     • Pharmacy Consult Request ...Pain Management Review 1 Each  1 Each   • [START ON 5/4/2022] omeprazole (PRILOSEC) capsule 20 mg  20 mg   • acetaminophen (TYLENOL) tablet 1,000 mg  1,000 mg    Followed by   • [START ON 5/8/2022] acetaminophen (TYLENOL) tablet 1,000 mg  1,000 mg   • lidocaine (LIDODERM) 5 % 1 Patch  1 Patch   • artificial tears ophthalmic solution 1 Drop  1 Drop   • benzocaine-menthol (Cepacol) lozenge 1 Lozenge  1 Lozenge   • mag hydrox-al hydrox-simeth (MAALOX PLUS ES or MYLANTA DS) suspension 20 mL  20 mL   • ondansetron (ZOFRAN ODT) dispertab 4 mg  4 mg    Or   • ondansetron (ZOFRAN) syringe/vial injection 4 mg  4 mg   • sodium chloride (OCEAN) 0.65 % nasal spray 2 Spray  2 Spray   • [START ON 5/4/2022] therapeutic multivitamin-minerals (THERAGRAN-M) tablet 1 Tablet  1 Tablet   • melatonin tablet 3 mg  3 mg   • magnesium hydroxide (MILK OF MAGNESIA) suspension 30 mL  30 mL   • polyethylene glycol/lytes (MIRALAX) PACKET 1 Packet  1 Packet   • oxyCODONE immediate-release (ROXICODONE) tablet 2.5-5 mg  2.5-5 mg   • naproxen (NAPROSYN) tablet 500 mg  500 mg  "      PHYSICAL EXAM:     VITAL SIGNS:   height is 1.676 m (5' 6\") and weight is 91.7 kg (202 lb 1.6 oz). Her oral temperature is 36.4 °C (97.6 °F). Her blood pressure is 135/67 and her pulse is 90. Her respiration is 18 and oxygen saturation is 96%.     General: well-groomed sitting up in no acute distress, no visitors present  Eyes: no scleral icterus or conjunctival injection  Ears, nose, mouth and throat: moist oral mucosa  Cardiovascular: regular rate, good peripheral perfusion, negative Isidoro sign bilaterally  Respiratory: breathing comfortably without use of accessory muscles  Gastrointestinal: soft, nontender, nondistended  Genitourinary: no indwelling baker  Musculoskeletal: good symmetry in bilateral shoulders, tender to palpation in anterolateral aspect of right ankle with swelling, minimal tenderness to posterolateral aspect of right ankle with no swelling  Skin: no wounds seen on exposed skin    Neurologic:  Mental status:  A&Ox4 (person, place, date, situation) answers questions appropriately follows commands  Speech: fluent, no aphasia or dysarthria    Motor:    Upper Extremity  Myotome R L   Shoulder flexion C5 5/5 5/5   Elbow flexion C5 5/5 5/5   Wrist extension C6 5/5 5/5   Elbow extension C7 5/5 5/5   Finger flexion C8 5/5 5/5   Finger abduction T1 5/5 5/5     Lower Extremity Myotome R L   Hip flexion L2 3*/5 5/5   Knee extension L3 3*/5 5/5   Ankle dorsiflexion L4 5/5 5/5   Toe extension L5 5/5 5/5   Ankle plantarflexion S1 5/5 5/5     *limited by pain    Sensory:   intact to light touch through out  No clonus at bilateral ankles  Tone: no spasticity noted  Psychiatric: appropriate affect  Hematologic/lymphatic/immunologic: no IV access, +bruises seen on exposed skin (previous IV access spots)    LABS:  Recent Labs     05/01/22  0424 05/02/22  0406   SODIUM 136 132*   POTASSIUM 4.0 4.1   CHLORIDE 103 100   CO2 25 25   GLUCOSE 108* 106*   BUN 12 14   CREATININE 0.54 0.59   CALCIUM 8.7 8.8 "     Recent Labs     05/01/22  0424 05/02/22  0406 05/03/22  0452   WBC 6.9 5.6 4.6*   RBC 3.12* 3.01* 2.93*   HEMOGLOBIN 10.2* 9.8* 9.3*   HEMATOCRIT 30.4* 29.4* 28.2*   MCV 97.4 97.7 96.2   MCH 32.7 32.6 31.7   MCHC 33.6 33.3* 33.0*   RDW 50.2* 50.3* 49.7   PLATELETCT 61* 62* 66*   MPV 10.7 9.9 10.3           PRIMARY REHAB DIAGNOSIS:    This patient is a 72 y.o. female admitted for acute inpatient rehabilitation with Fracture of femoral neck, right, closed (HCC).    IMPAIRMENTS:   ADLs/IADLs  Mobility    SECONDARY DIAGNOSIS/MEDICAL CO-MORBIDITIES AFFECTING FUNCTION:  -pancytopenia  -Dizziness with standing  -Constipation  -Postoperative anemia    RELEVANT CHANGES SINCE PREADMISSION EVALUATION:    Status unchanged    The patient's rehabilitation potential is Good  The patient's medical prognosis is good    PLAN:   Discussion and Recommendations:   1. The patient requires an acute inpatient rehabilitation program with a coordinated program of care at an intensity and frequency not available at a lower level of care. This recommendation is substantiated by the patient's medical physicians who recommend that the patient's intervention and assessment of medical issues needs to be done at an acute level of care for patient's safety and maximum outcome.   2. A coordinated program of care will be supplied by an interdisciplinary team of physical therapy, occupational therapy, rehab physician, rehab nursing, and, if needed, speech therapy and rehab psychology. Rehab team presents a patient-specific rehabilitation and education program concentrating on prevention of future problems related to accessibility, mobility, skin, bowel, bladder, sexuality, and psychosocial and medical/surgical problems.   3. Need for Rehabilitation Physician: The rehab physician will be evaluating the patient on a multi-weekly basis to help coordinate the program of care. The rehab physician communicates between medical physicians, therapists, and  nurses to maximize the patient's potential outcome. Specific areas in which the rehab physician will be providing daily assessment include the following:   A. Assessing the patient's heart rate and blood pressure response (vitals monitoring) to activity and making adjustments in medications or conservative measures as needed.   B. The rehab physician will be assessing the frequency at which the program can be increased to allow the patient to reach optimal functional outcome.   C. The rehab physician will also provide assessments in daily skin care, especially in light of patient's impairments in mobility.   D. The rehab physician will provide special expertise in understanding how to work with functional impairment and recommend appropriate interventions, compensatory techniques, and education that will facilitate the patient's outcome.   4. Rehab R.N.   The rehab RN will be working with patient to carry over in room mobility and activities of daily living when the patient is not in 3 hours of skilled therapy. Rehab nursing will be working in conjunction with rehab physician to address all the medical issues above and continue to assess laboratory work and discuss abnormalities with the treating physicians, assess vitals, and response to activity, and discuss and report abnormalities with the rehab physician. Rehab RN will also continue daily skin care, supervise bladder/bowel program, instruct in medication administration, and ensure patient safety.   5. Rehab Therapy: Therapies to treat at intensity and frequency of (may change after completion of evaluation by all therapeutic disciplines):       PT:  Physical therapy to address mobility, transfer, gait training and evaluation for adaptive equipment needs 1-1.5 hour/day at least 5 days/week for the duration of the ELOS (see below)       OT:  Occupational therapy to address ADLs, self-care, home management training, functional mobility/transfers and assistive  device evaluation, and community re-integration 1 -1.5 hour/day at least 5 days/week for the duration of the ELOS (see below).     Medical management / Rehabilitation Issues/ Adverse Potential as part of rehabilitation plan     REHABILITATION ISSUES/ADVERSE POTENTIAL and MEDICAL CO-MORBIDITIES/ADVERSE POTENTIAL AFFECTING FUNCTION:    Data points:  Reviewed results of radiology tests  Reviewed clinical lab tests  Reviewed old records    ##MSK  #Impaired ADLs and mobility  Patient is admitted to [facility] for comprehensive rehabilitation therapy as described.   Rehabilitation nursing monitors bowel and bladder control, educates on medication administration, co-morbidities and monitors patient safety.  Estimated length of stay: 5-14 days  Anticipated discharge date: TBD  Anticipated discharge destination: home with spouse  Prognosis: good  Equipment: TBD  Postdischarge therapies: TBD  Followup: orthopedic surgery (Dr. Cassius Zuniga MD)   Code: full resuscitation    #Posttraumatic pain, acute, improving  #Postsurgical pain, acute, improving  Ordered tylenol 1000mg TID, lidocaine patch 5% one patch daily, robaxin 750mg TID PRN spasms, oxycodone 2.5-5mg Q4hr PRN pain, naproxen 250mg BID  Encourage ice to affected locations    #Acute impacted transcervical right femoral neck fracture s/p Open treatment with internal fixation of right femoral neck fracture on 4/29/2022 by Dr. Cassius Zuniga MD  #Acute right distal radial metaphyseal fracture s/p Closed treatment without manipulation  #old right healed pubic rami fractures with bone remodeling  toe-touch weightbearing of right lower limb and nonweight bearing of right wrist (ok for platform walker)     ##NEURO  Monitor    #RESP  Respiratory therapy: RT performs O2 management prn, breathing retraining, pulmonary hygiene and bronchospasm management prn to optimize participation in therapies.     ##CARDIAC  DVT prophylaxis:  Patient is on lovenox 40mg SC  daily for anticoagulation upon transfer. Encourage OOB. Monitor daily for signs and symptoms of DVT including but not limited to swelling and pain to prevent the development of DVT that may interfere with therapies.    ##GI  GI prophylaxis:  On prilosec 20mg daily to prevent gastritis/dyspepsia which may interfere with therapies.  #At risk of opioid induced constipation  Goal of one continent BM daily  Ordered miralax 17g BID    #Nutrition/Dysphagia: Dietician monitors nutrient intake, recommend supplements prn and provide nutrition education to pt/family to promote optimal nutrition for wound healing/recovery.   Orders Placed This Encounter   Procedures   • Diet Order Diet: Regular     Standing Status:   Standing     Number of Occurrences:   1     Order Specific Question:   Diet:     Answer:   Regular [1]   Ordered multivitamin daily    ##/RENAL  #At risk for urinary retention  timed voids Q3-4hr while awake followed by checking postvoid residual to ensure complete emptying. If PVR>200mL, intermittent catheterization    ##HEME  #Pancytopenia  Vitamin B12 496  Ordered anemia panel: iron panel, ferritin, Vitamin D 25 OH levels  Monitor    ##SKIN  Skin/dermal ulcer prophylaxis: Monitor for new skin conditions with q.2 h. turns as required to prevent the development of skin breakdown.     I personally performed a complete drug regimen review and no potential clinically significant medication issues were identified.     Patient was seen for 84 minutes on unit/floor of which > 50% of time was spent on counseling and coordination of care regarding the above, including prognosis, risk reduction, benefits of treatment, and options for next stage of care.    GOALS/EXPECTED LEVEL OF FUNCTION BASED ON CURRENT MEDICAL AND FUNCTIONAL STATUS (may change based on patient's medical status and rate of impairment recovery):  Transfers:   Supervision  Mobility/Gait:   Supervision at the wheelchair level for longer distances  ADL's:    Supervision

## 2022-05-03 NOTE — PROGRESS NOTES
Patient admitted to facility at 1125 via W/C; accompanied by hospital transport.  Patient assisted to room and positioned in bed for comfort and safety; call light within reach.  Patient assisted with stowing belongings and oriented to room and facility.  Admission assessment performed and documented in computer.  Admission paperwork completed; signed copies placed in chart.  Will continue to monitor.

## 2022-05-03 NOTE — DISCHARGE PLANNING
Anticipated Discharge Disposition: [facility]    Action: COBRA form filled out and provided to bedside nurse. Transport set between 11:00-11:30am.    Barriers to Discharge: physician signature on COBRA form.    Plan: Obtain physician signature. F/u with nursing regarding COBRA form and any additional needs.

## 2022-05-03 NOTE — PROGRESS NOTES
Discharge instructions provided to pt.  Pt states understanding.  Pt states all questions have been answered.  Copy of discharge provided to pt.  Signed copy in chart. Pt states that all personal belongings are in possession. Patient discharged to [facility] via T transportation.

## 2022-05-03 NOTE — CARE PLAN
The patient is Stable - Low risk of patient condition declining or worsening    Shift Goals  Clinical Goals: ambulate, up for meals, pain control  Patient Goals: res, comfort, heal  Family Goals: Not present    Progress made toward(s) clinical / shift goals:    Problem: Knowledge Deficit - Standard  Goal: Patient and family/care givers will demonstrate understanding of plan of care, disease process/condition, diagnostic tests and medications  Outcome: Progressing   Pt verbalizes understanding to RN teachings and asks questions and is involved in her care.   Problem: Fall Risk  Goal: Patient will remain free from falls  Outcome: Progressing   Pt is a moderate to low fall risk. Precautions are in place.   Problem: Mobility  Goal: Patient's capacity to carry out activities will improve  Outcome: Progressing   Pt is mobilizing with staff to commode and is attempting increase distance and time with patient.    Patient is not progressing towards the following goals:

## 2022-05-03 NOTE — PROGRESS NOTES
Assessment completed. Pt A&Ox 4. Respirations are even and unlabored on room air. Pt reports pain at this time. Medical patient, VS stable, call light and belongings within reach. POC updated (US Abdomen, D/C possible Rehab). Pt educated on room and call light, pt verbalized understanding. Communication board updated. Needs met.

## 2022-05-03 NOTE — CARE PLAN
"The patient is Stable - Low risk of patient condition declining or worsening       Problem: Pain - Standard  Goal: Alleviation of pain or a reduction in pain to the patient’s comfort goal  Note: Patient able to verbalize pain level and verbalize an acceptable level of pain.      Problem: Fall Risk - Rehab  Goal: Patient will remain free from falls  Note: Em Salcido Fall risk Assessment Score: 12      Moderate fall risk Interventions  - Bed and strip alarm   - Yellow sign by the door   - Yellow wrist band \"Fall risk\"  - Room near to the nurse station  - Do not leave patient unattended in the bathroom  - Fall risk education provided           "

## 2022-05-03 NOTE — THERAPY
"Occupational Therapy  Daily Treatment     Patient Name: Carmtia Miller  Age:  72 y.o., Sex:  female  Medical Record #: [id number]  Today's Date: 5/2/2022     Precautions: Fall Risk,Toe Touch Weight Bearing Right Lower Extremity,Platform Weight Bearing Right Upper Extremity  Comments: NWB R wrist and hand    Assessment    Pt seen for OT tx, very motivated to clean up and give herself a seated sponge bath. Pt maintains NWB RUE and TTWB RLE well, repeated cues required for safe L hand placement during sit <> stand transfers. See objective for session details. Pt receptive to education regarding post-acute options, Physiatrist present at end of OT session to consult.     Plan    Continue current treatment plan.    DC Equipment Recommendations: 3-in-1 commode  Discharge Recommendations: Recommend post-acute placement for additional occupational therapy services prior to discharge home    Subjective    \"My  is working on getting a ramp built for me.\"     Objective       05/02/22 1645   Precautions   Precautions Fall Risk;Toe Touch Weight Bearing Right Lower Extremity;Platform Weight Bearing Right Upper Extremity   Pain 0 - 10 Group   Therapist Pain Assessment Post Activity Pain Same as Prior to Activity;Nurse Notified;0   Cognition    Level of Consciousness Alert   Comments motivated   Balance   Sitting Balance (Static) Fair   Sitting Balance (Dynamic) Fair   Standing Balance (Static) Poor +   Standing Balance (Dynamic) Poor +   Weight Shift Sitting Fair   Weight Shift Standing Poor   Skilled Intervention Tactile Cuing;Verbal Cuing;Sequencing   Comments PFWW   Bed Mobility    Comments not observed this session, receieved EOB with PT   Activities of Daily Living   Eating Modified Independent   Grooming Supervision;Seated   Upper Body Dressing Minimal Assist   Toileting Minimal Assist   How much help from another person does the patient currently need...   Putting on and taking off regular lower body clothing? 3 "   Bathing (including washing, rinsing, and drying)? 3   Toileting, which includes using a toilet, bedpan, or urinal? 3   Putting on and taking off regular upper body clothing? 3   Taking care of personal grooming such as brushing teeth? 3   Eating meals? 4   6 Clicks Daily Activity Score 19   Functional Mobility   Sit to Stand Minimal Assist   Bed, Chair, Wheelchair Transfer Minimal Assist   Tub / Shower Transfers Minimal Assist   Skilled Intervention Verbal Cuing;Tactile Cuing;Sequencing   Comments repeated cues required for hand placement to facilitate sit <> stand   Activity Tolerance   Sitting in Chair 20+min   Sitting Edge of Bed functional   Standing 4min at a time   Patient / Family Goals   Patient / Family Goal #1 to go home   Short Term Goals   Short Term Goal # 1 pt will complete grooming seated w/set up   Goal Outcome # 1 Goal met   Short Term Goal # 2 pt will complete FB Dressing w/spv   Goal Outcome # 2 Progressing as expected   Short Term Goal # 3 pt will complete toilet txf w/spv   Goal Outcome # 3 Progressing as expected   Education Group   Education Provided Role of Occupational Therapist;Activities of Daily Living;Transfers   Role of Occupational Therapist Patient Response Patient;Acceptance;Explanation;Verbal Demonstration   Transfers Patient Response Patient;Acceptance;Explanation;Verbal Demonstration   ADL Patient Response Patient;Acceptance;Explanation;Verbal Demonstration   Anticipated Discharge Equipment and Recommendations   DC Equipment Recommendations Unable to determine at this time   Discharge Recommendations Recommend post-acute placement for additional occupational therapy services prior to discharge home

## 2022-05-03 NOTE — DISCHARGE SUMMARY
Discharge Summary    CHIEF COMPLAINT ON ADMISSION  Chief Complaint   Patient presents with   • T-5000 GLF     Pt fell onto her R side after a dog caused her to fall onto her R wrist and R hip. Pt has R hip and R wrist pain. No obvious deformities, CMS intact       Reason for Admission  ems     CODE STATUS  Full Code    HPI & HOSPITAL COURSE  This is a 72 y.o. female here with right femoral neck fracture, right distal radius fracture after a fall.  Orthopedic surgery was consulted on the case.  Patient underwent Open treatment with internal fixation of right femoral neck fracture and Closed treatment without manipulation, right extra-articular distal radius fracture on 4/29/2022.  Postoperatively her pain was well controlled.  Physical therapy and Occupational Therapy came to evaluate her and she will need continued rehabilitation.  She was also noted to have pancytopenia, on further questioning diabetes appears to be chronic.  She was referred to hematology 2 years ago but was not able to follow-up.  She has been advised and recommended to follow-up with hematology as outpatient for further work-up.  Orthopedic surgery has cleared her for discharge.      Therefore, she is discharged in good and stable condition to an inpatient rehabilitation hospital.    The patient met 2-midnight criteria for an inpatient stay at the time of discharge.      FOLLOW UP ITEMS POST DISCHARGE  Follow-up with orthopedic surgery  Follow-up with hematology    DISCHARGE DIAGNOSES  Principal Problem:    Fracture of femoral neck, right, closed (HCC) POA: Yes  Active Problems:    Closed fracture of distal end of right radius POA: Yes    Macrocytosis POA: Yes    Pancytopenia (HCC) POA: Unknown    Hyponatremia POA: Unknown    Obesity (BMI 30-39.9) POA: Yes  Resolved Problems:    * No resolved hospital problems. *      FOLLOW UP  No future appointments.  Cassius Zuniga M.D.  [address]  [address]  [place]  [address]  [phone]    In 2 weeks        MEDICATIONS ON DISCHARGE     Medication List      START taking these medications      Instructions   enoxaparin 40 MG/0.4ML Sosy inj  Start taking on: May 4, 2022  Commonly known as: Lovenox   Inject 40 mg under the skin every day.  Dose: 40 mg     HYDROcodone-acetaminophen 5-325 MG Tabs per tablet  Commonly known as: NORCO   Take 1 Tablet by mouth every four hours as needed (pain) for up to 3 days.  Dose: 1 Tablet     methocarbamol 750 MG Tabs  Commonly known as: ROBAXIN   Take 1 Tablet by mouth 3 times a day as needed (spasms).  Dose: 750 mg        CONTINUE taking these medications      Instructions   Multivitamin Women 50+ Tabs   Take 1 Tablet by mouth every morning.  Dose: 1 Tablet     vitamin D3 1000 Unit (25 mcg) Tabs  Commonly known as: cholecalciferol   Take 1,000 Units by mouth every morning.  Dose: 1,000 Units        STOP taking these medications    GAS ENZYME SUPPLEMENT PO            Allergies  No Known Allergies    DIET  Orders Placed This Encounter   Procedures   • Diet Order Diet: Regular     Standing Status:   Standing     Number of Occurrences:   1     Order Specific Question:   Diet:     Answer:   Regular [1]       ACTIVITY  As tolerated and directed by rehab.  0-lb weight restriction RIGHT arm    LINES, DRAINS, AND WOUNDS  This is an automated list. Peripheral IVs will be removed prior to discharge.  Peripheral IV 04/29/22 20 G Left Wrist (Active)   Site Assessment Clean;Dry;Intact 05/03/22 0800   Dressing Type Transparent 05/03/22 0800   Line Status Flushed;Scrubbed the hub prior to access;Saline locked 05/03/22 0800   Dressing Status Clean;Dry;Intact 05/03/22 0800   Dressing Intervention N/A 05/03/22 0800   Date Secondary Tubing Changed 04/30/22 04/30/22 0900   Infiltration Grading ([facility], [place]) 0 05/03/22 0800   Phlebitis Scale ([facility] Only) 0 05/03/22 0800       Peripheral IV 05/02/22 22 G Right Antecubital (Active)       Wound 04/29/22 Incision  Hip Right Xeroform, 4x4s, Leukomed (Active)   Site Assessment CASPER 05/03/22 0730   Periwound Assessment Clean;Dry;Intact;CASPER 05/03/22 0730   Margins Attached edges;CASPER 05/03/22 0730   Closure Staples;CASPER 05/03/22 0730   Drainage Amount None 05/03/22 0730   Treatments Site care 05/03/22 0730   Wound Cleansing Not Applicable 05/02/22 2000   Periwound Protectant Not Applicable 05/02/22 2000   Dressing Cleansing/Solutions Not Applicable 05/02/22 2000   Dressing Options [place] Dressing 05/03/22 0730   Dressing Changed Observed 05/03/22 0730   Dressing Status Clean;Dry;Intact 05/03/22 0730   Dressing Change/Treatment Frequency Every 48 hrs, and As Needed 05/03/22 0730       Wound 04/29/22 Arm Right (Active)   Site Assessment CASPER 05/03/22 0730   Periwound Assessment CASPER 05/03/22 0730   Margins CASPER 05/03/22 0730   Closure CASPER 05/03/22 0730   Drainage Amount CASPER 05/03/22 0730   Drainage Description CASPER 05/03/22 0730   Treatments Site care 05/03/22 0730   Wound Cleansing Not Applicable 05/02/22 2000   Periwound Protectant Not Applicable 05/02/22 2000   Dressing Cleansing/Solutions Not Applicable 05/02/22 2000   Dressing Options Ace Wrap;Dry Roll Gauze;Splint 05/02/22 2000   Dressing Changed Observed 05/03/22 0730   Dressing Status Clean;Dry;Intact 05/03/22 0730   Dressing Change/Treatment Frequency As Needed 05/03/22 0730       Wound 04/30/22 Skin Tear Buttocks (Active)   Wound Image   04/30/22 0019   Site Assessment Red 05/03/22 0730   Periwound Assessment Clean;Dry;Intact 05/03/22 0730   Margins Defined edges 05/03/22 0730   Closure Open to air 05/03/22 0730   Drainage Amount None 05/03/22 0730   Drainage Description Sanguineous 04/30/22 0019   Treatments Site care 05/03/22 0730   Wound Cleansing Not Applicable 05/02/22 2000   Periwound Protectant Not Applicable 05/02/22 2000   Dressing Cleansing/Solutions Not Applicable 05/02/22 2000   Dressing Options Open to Air 05/03/22 0730   Dressing Changed Observed 05/03/22 0730    Dressing Status Clean;Dry;Intact 05/03/22 0730   Dressing Change/Treatment Frequency As Needed 05/03/22 0730       Peripheral IV 04/29/22 20 G Left Wrist (Active)   Site Assessment Clean;Dry;Intact 05/03/22 0800   Dressing Type Transparent 05/03/22 0800   Line Status Flushed;Scrubbed the hub prior to access;Saline locked 05/03/22 0800   Dressing Status Clean;Dry;Intact 05/03/22 0800   Dressing Intervention N/A 05/03/22 0800   Date Secondary Tubing Changed 04/30/22 04/30/22 0900   [place] [facility] [place] 0 05/03/22 0800   Phlebitis Scale ([facility]) 0 05/03/22 0800       Peripheral IV 05/02/22 22 G Right Antecubital (Active)               MENTAL STATUS ON TRANSFER  Alert oriented x4          CONSULTATIONS  Orthopedic surgery - [place]    PROCEDURES  Open treatment with internal fixation of right femoral neck fracture. Closed treatment without manipulation, right extra-articular distal radius fracture.  4/29/2022    LABORATORY  Lab Results   Component Value Date    SODIUM 132 (L) 05/02/2022    POTASSIUM 4.1 05/02/2022    CHLORIDE 100 05/02/2022    CO2 25 05/02/2022    GLUCOSE 106 (H) 05/02/2022    BUN 14 05/02/2022    CREATININE 0.59 05/02/2022        Lab Results   Component Value Date    WBC 4.6 (L) 05/03/2022    HEMOGLOBIN 9.3 (L) 05/03/2022    HEMATOCRIT 28.2 (L) 05/03/2022    PLATELETCT 66 (L) 05/03/2022        Total time of the discharge process exceeds 35 minutes.

## 2022-05-03 NOTE — PROGRESS NOTES
4 Eyes Skin Assessment Completed by ERIC BAUTISTA and ERIC GUPTA.    Head WDL  Ears WDL  Nose WDL  Mouth WDL  Neck WDL  Breast/Chest WDL  Shoulder Blades WDL  Spine WDL  (R) Arm/Elbow/Hand CASTED  (L) Arm/Elbow/Hand WDL  Abdomen WDL  Groin WDL  Scrotum/Coccyx/Buttocks WDL  (R) Leg Bruising, Edema and Incision  (L) Leg WDL  (R) Heel/Foot/Toe WDL  (L) Heel/Foot/Toe WDL    Devices In Places NONE    Interventions In Place Pillows and Pressure Redistribution Mattress    Possible Skin Injury No    Pictures Uploaded Into Epic Yes  Wound Consult Placed N/A  RN Wound Prevention Protocol Ordered No

## 2022-05-03 NOTE — FLOWSHEET NOTE
05/03/22 1350   Patient History   Pulmonary Diagnosis None   Procedures Relevant to Respiratory Status none   Home O2 No   Nocturnal CPAP No   Home Treatments/Frequency No

## 2022-05-03 NOTE — PREADMISSION SCREENING NOTE
Pre-Admission Screening Form    Patient Information:   Name: Carmita Miller     MRN: [id number]       : 1950      Age: 72 y.o.   Gender: female      Race: White [7]       Marital Status:  [2]  Family Contact: Anthony Miller        Relationship: Spouse [17]  Home Phone: [phone]           Cell Phone:   Advanced Directives: None  Code Status:  FULL  Current Attending Provider: Kavin Lomeli M.D.  Referring Physician: Dr. Mills  Physiatrist Consult: Dr. Shah   Referral Date: 22  Primary Payor Source:  MEDICARE  Secondary Payor Source:  [facility]    Medical Information:   Date of Admission to Acute Care [place]  Room Number: T303/02  Rehabilitation Diagnosis: 0008.11 - Orthopaedic Disorders: Status Post Unilateral Hip Fracture  Immunization History   Administered Date(s) Administered   • PFIZER PURPLE CAP SARS-COV-2 VACCINATION (12+) 2021, 2021, 10/12/2021     No Known Allergies  Past Medical History:   Diagnosis Date   • Patient denies medical problems      Past Surgical History:   Procedure Laterality Date   • ORIF, HIP Right 2022    Procedure: ORIF, HIP, SPLINTING RIGHT WRIST;  Surgeon: Cassius Zuniga M.D.;  Location: SURGERY [facility];  Service: Orthopedics       History Leading to Admission, Conditions that Caused the Need for Rehab (CMS):     Dr. Diaz H&P:  Chief Complaint   Patient presents with   • T-5000 GLF       Pt fell onto her R side after a dog caused her to fall onto her R wrist and R hip. Pt has R hip and R wrist pain. No obvious deformities, CMS intact         History of Presenting Illness  Carmita Miller is a 72 y.o. female with no significant past medical history who presented 2022 with status post fall landing on her right hip and right wrist.  Patient was seen postoperatively.  History is obtained from chart review as patient is currently waking up from anesthesia.  X-rays done showed right valgus impacted femoral neck fracture and  minimally displaced distal radius metaphyseal fracture.  Orthopedics was consulted, patient was taken to the OR for surgical stabilization of her right femoral neck fracture.  Assessment/Plan:  Justification for Admission Status  I anticipate this patient will require at least two midnights for appropriate medical management, necessitating inpatient admission because impacted right femoral neck fracture and comminuted intra-articular distal radius fracture     * Fracture of femoral neck, right, closed (HCC)  Assessment & Plan  S/p surgical stabilization   PT/OT  Early mobility   Pain control      Obesity (BMI 30-39.9)  Assessment & Plan   on dietary and lifestyle modification once mental status improves      Closed fracture of distal end of right radius  Assessment & Plan  Non op. Ortho on board   PT/OT  Pain control         VTE prophylaxis: enoxaparin ppx    Dr. Shah (Physiatry) recommendations:  ASSESSMENT:  Patient is a 72 y.o. female admitted with right hip fracture and right wrist fracture now s/p ORIF right hip on 4/29 with Dr. Zuniga.      [facility] Code / Diagnosis to Support: 0008.11 - Orthopaedic Disorders: Status Post Unilateral Hip Fracture     Rehabilitation: Impaired ADLs and mobility  Patient is a good candidate for inpatient rehab based on needs for PT, OT.  Patient will also benefit from family training.  Patient has a good discharge situation which will be home with spouse.      Barriers to transfer include: Insurance authorization, TCCs to verify disposition, medical clearance and bed availability      All cases are subject to administrative review and recommendations may change     Additional Recommendations:  - Great candidate for IPR. Patient has deficits with mobility and ADLs secondary to her right hip and wrist fracture, now s/p ORIF with WB restrictions on her right leg and arm.   - spouse is very supportive, will install a ramp at their house, she can stay on the first floor.   - TCC  to arrange transport to [facility] tomorrow, pending medical clearance.      Anemia   - 9.8, slightly down from yesterday   - monitor      Thrombocytopenia   - Plt 62 - stable   - Monitor closely, must be above 50 for RRH     Constipation   - increase PRN bowel meds  - Miralax once now      Hyponatremia   - Na 132 today   - monitor closely   - consider salt tabs if sodium continues to drop      Pain control   - AVOID IV pain medication - had Toradol once this morning   - Continue with tylenol as first line and norco for breakthrough         Medical Complexity:  Anemia   Thrombocytopenia   Hyponatremia         DVT PPX: Lovenox     Dr. Zuniga (Surgery Orthopedic) recommendations:  ASSESSMENT:  A 72-year-old female with a right valgus impacted, but complete   femoral neck fracture and right distal radial metaphyseal fracture which is   minimally displaced.  She is being evaluated for admission to the hospitalist   service.     RECOMMENDATIONS:  1.  I discussed these findings with the patient and her  and I do   recommend surgical stabilization of her right femoral neck fracture to allow   for early mobilization and minimize the risk of fracture, collapse and   displacement, which would potentially require arthroplasty.  I recommend   nonoperative management for her right distal radius fracture with splint   immobilization for now.  2.  We did discuss potential risks of surgery including infection, potential   for injury to neurovascular structures, DVT, pulmonary embolism, bleeding,   loss of fixation, potential need for further surgery such as hip arthroplasty   and general risks of anesthesia.  She expressed understanding and wished to   proceed with surgery when possible.  3.  The patient is n.p.o., so make preparations to go to the operating room   for surgical fixation of right femoral neck fracture and splinting to the   right wrist if they are unable to get to it in the emergency department prior   to surgical  management.    Cassius Zuniga M.D.   Physician   Surgery Orthopedic   OP Report      Unsigned Transcription   Date of Service:  4/29/2022 10:40 PM             (suggestion)   Draft: Not electronically signed.                []Hide copied text    []Tomver for details    DATE OF SERVICE:  04/29/2022      PREOPERATIVE DIAGNOSES:  1.  Right nondisplaced femoral neck fracture.  2.  Right minimally displaced distal radial metaphyseal fracture.     POSTOPERATIVE DIAGNOSES:  1.  Right nondisplaced femoral neck fracture.  2.  Right minimally displaced distal radial metaphyseal fracture.     PROCEDURES PERFORMED:  1.  Open treatment with internal fixation of right femoral neck fracture.  2.  Closed treatment without manipulation, right extra-articular distal radius   fracture.     SURGEON:  Cassius Zuniga MD     ANESTHESIOLOGIST:  Troy Porter MD     ANESTHESIA:  General.     ESTIMATED BLOOD LOSS:  300 mL.     IMPLANTS:  Synthes 2-hole femoral neck system with a 100 mm bolt and 100 mm   anti-rotation screw and two 5.0 mm locking screws.     INDICATIONS FOR PROCEDURE:  The patient is a 72-year-old female.  She had a   fall, sustained a right nondisplaced slightly valgus impacted femoral neck   fracture as well as a right minimally displaced distal radial metaphyseal   fracture.  She was seen in the emergency department, evaluated for admission   to the hospitalist service, felt to be medically optimized for surgical   management.  I recommended surgical reduction and fixation of her femoral neck   fracture to prevent fracture displacement and allow for early mobility and   partial weightbearing as well as nonoperative management of right distal   radius fracture.  She signed informed consent preoperatively and wished to   proceed with surgery.        [facility] 04-29-22:  FINDINGS:  There is an impacted transcervical right femoral neck fracture. No distal femur fracture identified. There appear to be old right healed  "pubic rami fractures with bone remodeling.     IMPRESSION:     Impacted transcervical right femoral neck fracture.    Co-morbidities: See PMH  Potential Risk - Complications: Contractures, Deep Vein Thrombosis, Incontinence, Malnutrition, Pain, Pneumonia, Pressure Ulcer and Urinary Tract Infection  Level of Risk: High    Ongoing Medical Management Needed (Medical/Nursing Needs):   Patient Active Problem List    Diagnosis Date Noted   • Hyponatremia 05/02/2022   • Anemia 05/01/2022   • Closed fracture of distal end of right radius 04/30/2022   • Obesity (BMI 30-39.9) 04/30/2022   • Macrocytosis 04/30/2022   • Thrombocytopenia (HCC) 04/30/2022   • Fracture of femoral neck, right, closed (HCC) 04/29/2022     A & O    Current Vital Signs:   Temperature: 36.1 °C (97 °F) Pulse: (!) 59 Respiration: 17 Blood Pressure : 120/56  Weight: 88.5 kg (195 lb) Height: 167.6 cm (5' 6\")  Pulse Oximetry: 90 % O2 (LPM): 0      Completed Laboratory Reports:  Recent Labs     05/01/22  0424 05/02/22  0406 05/03/22  0452   WBC 6.9 5.6 4.6*   HEMOGLOBIN 10.2* 9.8* 9.3*   HEMATOCRIT 30.4* 29.4* 28.2*   PLATELETCT 61* 62* 66*   SODIUM 136 132*  --    POTASSIUM 4.0 4.1  --    BUN 12 14  --    CREATININE 0.54 0.59  --    ALBUMIN  --  3.3  --    GLUCOSE 108* 106*  --      Additional Labs: Not Applicable    Prior Living Situation:   Housing / Facility: 2 Story House  Steps Into Home: 2  Steps In Home: 12  Lives with - Patient's Self Care Capacity: Spouse (2 large dogs)  Equipment Owned: Front-Wheel Walker,Single Point Cane,Crutches    Prior Level of Function / Living Situation:   Physical Therapy: Prior Services: Home-Independent  Housing / Facility: 2 Story House  Steps Into Home: 2  Steps In Home: 12  Bathroom Set up: Walk In Shower  Equipment Owned: Front-Wheel Walker,Single Point Cane,Crutches  Lives with - Patient's Self Care Capacity: Spouse (2 large dogs)  Bed Mobility: Independent  Transfer Status: Independent  Ambulation: " Independent  Assistive Devices Used: None  Stairs: Independent  Current Level of Function:   Gait Level Of Assist: Minimal Assist  Assistive Device: Platform Walker  Distance (Feet): 5  Deviation: Antalgic,Step To  # of Stairs Climbed: 0  Weight Bearing Status: TTWB RLE and PWB R UE  Skilled Intervention: Verbal Cuing,Compensatory Strategies  Supine to Sit: Standby Assist  Scooting: Standby Assist  Comments: not observed this session, receieved EOB with PT  Sit to Stand: Minimal Assist  Bed, Chair, Wheelchair Transfer: Minimal Assist  Tub / Shower Transfers: Minimal Assist  Transfer Method: Stand Step  Skilled Intervention: Verbal Cuing,Tactile Cuing,Sequencing  Sitting in Chair: 20+min  Sitting Edge of Bed: functional  Standinmin at a time  Occupational Therapy:   Self Feeding: Independent  Grooming / Hygiene: Independent  Bathing: Independent  Dressing: Independent  Toileting: Independent  Medication Management: Independent  Laundry: Independent  Kitchen Mobility: Independent  Finances: Independent  Home Management: Independent  Shopping: Independent  Prior Level Of Mobility: Independent Without Device in Community  Prior Services: Home-Independent  Housing / Facility: [address] [facility]  Current Level of Function:   Eating: Modified Independent  Upper Body Dressing: Minimal Assist  Lower Body Dressing: Moderate Assist  Toileting: Minimal Assist  Speech Language Pathology:      Rehabilitation Prognosis/Potential: Good  Estimated Length of Stay: 10-14 days    Nursing:      [facility]    Scope/Intensity of Services Recommended:  Physical Therapy: 1.5 hr / day  5 days / week. Therapeutic Interventions Required: Maximize Endurance, Mobility, Strength and Safety  Occupational Therapy: 1.5 hr / day 5 days / week. Therapeutic Interventions Required: Maximize Self Care, ADLs, IADLs and Energy Conservation  Rehabilitation Nursin/7. Therapeutic Interventions Required: Monitor Pain, Skin, Wound(s), Vital Signs, Intake and  Output, Labs, Safety and Family Training  Rehabilitation Physician: 3 - 5 days / week. Therapeutic Interventions Required: Medical Management    She requires 24-hour rehabilitation nursing to manage skin care, surgical incision, wound, nutrition and fluid intake, pain control, safety, medication management and patient/family goals. In addition, rehabilitation nursing will reiterate and reinforce therapy skills and equipment use, including ADLs, as well as provide education to the patient and family. Carmita Miller is willing to participate in and is able to tolerate the proposed plan of care.    Rehabilitation Goals and Plan (Expected frequency & duration of treatment in the IRF):   Return to the Community, Modified Independent Level of Care and Family Able to Provide 24/7 Assistance  Anticipated Date of Rehabilitation Admission: 05-03-22  Patient/Family oriented IRF level of care/facility/plan: Yes  Patient/Family willing to participate in IRF care/facility/plan: Yes  Patient able to tolerate IRF level of care proposed: Yes  Patient has potential to benefit IRF level of care proposed: Yes  Comments: Not Applicable    Special Needs or Precautions - Medical Necessity:  Safety Concerns/Precautions:  Fall Risk / High Risk for Falls, Balance and Bed / Chair Alarm  Complex Wound Care: Surgical  Precautions: Fall Risk,Toe Touch Weight Bearing Right Lower Extremity,Platform Weight Bearing Right Upper Extremity  Comments: NWB R wrist and hand  Pain Management  IV Site: Peripheral  Current Medications:    Current Facility-Administered Medications Ordered in Epic   Medication Dose Route Frequency Provider Last Rate Last Admin   • sennosides (SENOKOT) 8.6 MG tablet 8.6 mg  8.6 mg Oral QDAY PRN Vivien Mills M.D.       • polyethylene glycol/lytes (MIRALAX) PACKET 1 Packet  1 Packet Oral DAILY Vivien Mills M.D.   1 Packet at 05/03/22 0532   • bisacodyl (DULCOLAX) suppository 10 mg  10 mg Rectal DAILY Vivien Mills M.D.        • magnesium hydroxide (MILK OF MAGNESIA) suspension 30 mL  30 mL Oral QDAY PRN Vivien Mills M.D.   30 mL at 05/03/22 0533   • HYDROcodone-acetaminophen (NORCO) 5-325 MG per tablet 1 Tablet  1 Tablet Oral Q6HRS PRN Vivien Mills M.D.   1 Tablet at 05/03/22 0533   • ketorolac (TORADOL) injection 15 mg  15 mg Intravenous Q6HRS PRN Vivien Mills M.D.   15 mg at 05/02/22 1611   • morphine 4 MG/ML injection 2-4 mg  2-4 mg Intravenous Q3HRS PRN Vivien Mills M.D.       • methocarbamol (ROBAXIN) tablet 750 mg  750 mg Oral TID PRN Vivien Mills M.D.   750 mg at 05/03/22 0535   • acetaminophen (TYLENOL) tablet 1,000 mg  1,000 mg Oral Q6HRS PRN Harish De La Cruz P.AJOSEMANUEL   1,000 mg at 05/01/22 0030   • enoxaparin (Lovenox) inj 40 mg  40 mg Subcutaneous DAILY Vivien Mills M.D.   40 mg at 05/03/22 0534     No current [facility]-ordered outpatient medications on file.     Diet:   DIET ORDERS (From admission to next 24h)     Start     Ordered    04/30/22 0016  Diet Order Diet: Regular  ALL MEALS        Question:  Diet:  Answer:  Regular    04/30/22 0015                Anticipated Discharge Destination / Patient/Family Goal:  Destination: Home with Assistance Support System: Spouse and Family   Anticipated home health services: OT and PT  Previously used HH service/ provider: Not Applicable  Anticipated DME Needs: [employer]  Outpatient Services: OT and PT  Alternative resources to address additional identified needs:   No future appointments.  Pre-Screen Completed: 5/3/2022 9:24 AM Lloyd Brooks L.P.N.

## 2022-05-04 LAB
25(OH)D3 SERPL-MCNC: 31 NG/ML (ref 30–100)
ALBUMIN SERPL BCP-MCNC: 3.4 G/DL (ref 3.2–4.9)
ALBUMIN/GLOB SERPL: 1.4 G/DL
ALP SERPL-CCNC: 67 U/L (ref 30–99)
ALT SERPL-CCNC: 22 U/L (ref 2–50)
ANION GAP SERPL CALC-SCNC: 10 MMOL/L (ref 7–16)
AST SERPL-CCNC: 27 U/L (ref 12–45)
BASOPHILS # BLD AUTO: 0.5 % (ref 0–1.8)
BASOPHILS # BLD: 0.02 K/UL (ref 0–0.12)
BILIRUB SERPL-MCNC: 0.5 MG/DL (ref 0.1–1.5)
BUN SERPL-MCNC: 16 MG/DL (ref 8–22)
CALCIUM SERPL-MCNC: 9.1 MG/DL (ref 8.5–10.5)
CHLORIDE SERPL-SCNC: 102 MMOL/L (ref 96–112)
CO2 SERPL-SCNC: 25 MMOL/L (ref 20–33)
CREAT SERPL-MCNC: 0.54 MG/DL (ref 0.5–1.4)
EOSINOPHIL # BLD AUTO: 0.12 K/UL (ref 0–0.51)
EOSINOPHIL NFR BLD: 2.8 % (ref 0–6.9)
ERYTHROCYTE [DISTWIDTH] IN BLOOD BY AUTOMATED COUNT: 49.8 FL (ref 35.9–50)
FERRITIN SERPL-MCNC: 212 NG/ML (ref 10–291)
FLUAV RNA SPEC QL NAA+PROBE: NEGATIVE
FLUBV RNA SPEC QL NAA+PROBE: NEGATIVE
GFR SERPLBLD CREATININE-BSD FMLA CKD-EPI: 99 ML/MIN/1.73 M 2
GLOBULIN SER CALC-MCNC: 2.4 G/DL (ref 1.9–3.5)
GLUCOSE SERPL-MCNC: 97 MG/DL (ref 65–99)
HCT VFR BLD AUTO: 29 % (ref 37–47)
HGB BLD-MCNC: 9.5 G/DL (ref 12–16)
IMM GRANULOCYTES # BLD AUTO: 0.02 K/UL (ref 0–0.11)
IMM GRANULOCYTES NFR BLD AUTO: 0.5 % (ref 0–0.9)
IRON SATN MFR SERPL: 25 % (ref 15–55)
IRON SERPL-MCNC: 54 UG/DL (ref 40–170)
LYMPHOCYTES # BLD AUTO: 1.83 K/UL (ref 1–4.8)
LYMPHOCYTES NFR BLD: 42.7 % (ref 22–41)
MAGNESIUM SERPL-MCNC: 2.2 MG/DL (ref 1.5–2.5)
MCH RBC QN AUTO: 31.8 PG (ref 27–33)
MCHC RBC AUTO-ENTMCNC: 32.8 G/DL (ref 33.6–35)
MCV RBC AUTO: 97 FL (ref 81.4–97.8)
MONOCYTES # BLD AUTO: 0.44 K/UL (ref 0–0.85)
MONOCYTES NFR BLD AUTO: 10.3 % (ref 0–13.4)
NEUTROPHILS # BLD AUTO: 1.86 K/UL (ref 2–7.15)
NEUTROPHILS NFR BLD: 43.2 % (ref 44–72)
NRBC # BLD AUTO: 0 K/UL
NRBC BLD-RTO: 0 /100 WBC
PHOSPHATE SERPL-MCNC: 4.8 MG/DL (ref 2.5–4.5)
PLATELET # BLD AUTO: 76 K/UL (ref 164–446)
PMV BLD AUTO: 10.3 FL (ref 9–12.9)
POTASSIUM SERPL-SCNC: 4.7 MMOL/L (ref 3.6–5.5)
PREALB SERPL-MCNC: 14.3 MG/DL (ref 18–38)
PROT SERPL-MCNC: 5.8 G/DL (ref 6–8.2)
RBC # BLD AUTO: 2.99 M/UL (ref 4.2–5.4)
SARS-COV-2 RNA RESP QL NAA+PROBE: NOTDETECTED
SODIUM SERPL-SCNC: 137 MMOL/L (ref 135–145)
SPECIMEN SOURCE: NORMAL
TIBC SERPL-MCNC: 216 UG/DL (ref 250–450)
UIBC SERPL-MCNC: 162 UG/DL (ref 110–370)
WBC # BLD AUTO: 4.3 K/UL (ref 4.8–10.8)

## 2022-05-04 PROCEDURE — 83550 IRON BINDING TEST: CPT

## 2022-05-04 PROCEDURE — 85025 COMPLETE CBC W/AUTO DIFF WBC: CPT

## 2022-05-04 PROCEDURE — 83540 ASSAY OF IRON: CPT

## 2022-05-04 PROCEDURE — 700111 HCHG RX REV CODE 636 W/ 250 OVERRIDE (IP): Performed by: PHYSICAL MEDICINE & REHABILITATION

## 2022-05-04 PROCEDURE — 700101 HCHG RX REV CODE 250: Performed by: PHYSICAL MEDICINE & REHABILITATION

## 2022-05-04 PROCEDURE — A9270 NON-COVERED ITEM OR SERVICE: HCPCS | Performed by: PHYSICAL MEDICINE & REHABILITATION

## 2022-05-04 PROCEDURE — 97535 SELF CARE MNGMENT TRAINING: CPT

## 2022-05-04 PROCEDURE — 36415 COLL VENOUS BLD VENIPUNCTURE: CPT

## 2022-05-04 PROCEDURE — 84134 ASSAY OF PREALBUMIN: CPT

## 2022-05-04 PROCEDURE — 80053 COMPREHEN METABOLIC PANEL: CPT

## 2022-05-04 PROCEDURE — 99232 SBSQ HOSP IP/OBS MODERATE 35: CPT | Performed by: PHYSICAL MEDICINE & REHABILITATION

## 2022-05-04 PROCEDURE — 770010 HCHG ROOM/CARE - REHAB SEMI PRIVAT*

## 2022-05-04 PROCEDURE — 84100 ASSAY OF PHOSPHORUS: CPT

## 2022-05-04 PROCEDURE — 700102 HCHG RX REV CODE 250 W/ 637 OVERRIDE(OP): Performed by: PHYSICAL MEDICINE & REHABILITATION

## 2022-05-04 PROCEDURE — 97162 PT EVAL MOD COMPLEX 30 MIN: CPT

## 2022-05-04 PROCEDURE — 97166 OT EVAL MOD COMPLEX 45 MIN: CPT

## 2022-05-04 PROCEDURE — 97530 THERAPEUTIC ACTIVITIES: CPT

## 2022-05-04 PROCEDURE — 82306 VITAMIN D 25 HYDROXY: CPT

## 2022-05-04 PROCEDURE — 83735 ASSAY OF MAGNESIUM: CPT

## 2022-05-04 PROCEDURE — 82728 ASSAY OF FERRITIN: CPT

## 2022-05-04 RX ADMIN — ACETAMINOPHEN 1000 MG: 500 TABLET, FILM COATED ORAL at 08:15

## 2022-05-04 RX ADMIN — LIDOCAINE 1 PATCH: 50 PATCH TOPICAL at 08:16

## 2022-05-04 RX ADMIN — NAPROXEN 250 MG: 500 TABLET ORAL at 08:16

## 2022-05-04 RX ADMIN — ACETAMINOPHEN 1000 MG: 500 TABLET, FILM COATED ORAL at 14:58

## 2022-05-04 RX ADMIN — ACETAMINOPHEN 1000 MG: 500 TABLET, FILM COATED ORAL at 20:22

## 2022-05-04 RX ADMIN — POLYETHYLENE GLYCOL 3350 1 PACKET: 17 POWDER, FOR SOLUTION ORAL at 08:16

## 2022-05-04 RX ADMIN — OMEPRAZOLE 20 MG: 20 CAPSULE, DELAYED RELEASE ORAL at 08:16

## 2022-05-04 RX ADMIN — POLYETHYLENE GLYCOL 3350 1 PACKET: 17 POWDER, FOR SOLUTION ORAL at 20:22

## 2022-05-04 RX ADMIN — OXYCODONE 5 MG: 5 TABLET ORAL at 08:16

## 2022-05-04 RX ADMIN — ENOXAPARIN SODIUM 40 MG: 40 INJECTION SUBCUTANEOUS at 08:16

## 2022-05-04 RX ADMIN — MULTIPLE VITAMINS W/ MINERALS TAB 1 TABLET: TAB at 11:32

## 2022-05-04 RX ADMIN — NAPROXEN 250 MG: 500 TABLET ORAL at 20:22

## 2022-05-04 ASSESSMENT — BRIEF INTERVIEW FOR MENTAL STATUS (BIMS)
INITIAL REPETITION OF BED BLUE SOCK - FIRST ATTEMPT: 3
WHAT YEAR IS IT: CORRECT
BIMS SUMMARY SCORE: 15
ASKED TO RECALL SOCK: YES, NO CUE REQUIRED
ASKED TO RECALL BED: YES, NO CUE REQUIRED
ASKED TO RECALL BLUE: YES, NO CUE REQUIRED
WHAT DAY OF THE WEEK IS IT: CORRECT
WHAT MONTH IS IT: ACCURATE WITHIN 5 DAYS

## 2022-05-04 ASSESSMENT — GAIT ASSESSMENTS
GAIT LEVEL OF ASSIST: MINIMAL ASSIST
ASSISTIVE DEVICE: PLATFORM WALKER
DISTANCE (FEET): 2

## 2022-05-04 ASSESSMENT — ACTIVITIES OF DAILY LIVING (ADL): TOILETING: INDEPENDENT

## 2022-05-04 NOTE — PROGRESS NOTES
"REHABILITATION PROGRESS NOTE    Date of Admission: 5/3/2022    [facility] Code / Diagnosis to Support: 0008.4 - Orthopaedic Disorders: Status Post Major Multiple Fractures  Etiologic Diagnosis: Fracture of femoral neck, right, closed (HCC)    SUBJECTIVE  Patient seen in room participating in therapies  GI: BM 5/4 (prior BM 4/29)  : continent  Psych: did not sleep well last night, reports it was due to new scenery  MSK: pain unchanged but feels like she can participate better in therapies    I have performed a physical exam and reviewed and updated history and plan today (5/4/2022).     MEDICATIONS:  Current Facility-Administered Medications   Medication Dose   • enoxaparin (Lovenox) inj 40 mg  40 mg   • methocarbamol (ROBAXIN) tablet 750 mg  750 mg   • Respiratory Therapy Consult     • Pharmacy Consult Request ...Pain Management Review 1 Each  1 Each   • omeprazole (PRILOSEC) capsule 20 mg  20 mg   • acetaminophen (TYLENOL) tablet 1,000 mg  1,000 mg    Followed by   • [START ON 5/8/2022] acetaminophen (TYLENOL) tablet 1,000 mg  1,000 mg   • lidocaine (LIDODERM) 5 % 1 Patch  1 Patch   • artificial tears ophthalmic solution 1 Drop  1 Drop   • benzocaine-menthol (Cepacol) lozenge 1 Lozenge  1 Lozenge   • mag hydrox-al hydrox-simeth (MAALOX PLUS ES or MYLANTA DS) suspension 20 mL  20 mL   • ondansetron (ZOFRAN ODT) dispertab 4 mg  4 mg    Or   • ondansetron (ZOFRAN) syringe/vial injection 4 mg  4 mg   • sodium chloride (OCEAN) 0.65 % nasal spray 2 Spray  2 Spray   • therapeutic multivitamin-minerals (THERAGRAN-M) tablet 1 Tablet  1 Tablet   • melatonin tablet 3 mg  3 mg   • magnesium hydroxide (MILK OF MAGNESIA) suspension 30 mL  30 mL   • oxyCODONE immediate-release (ROXICODONE) tablet 2.5-5 mg  2.5-5 mg   • naproxen (NAPROSYN) tablet 250 mg  250 mg   • polyethylene glycol/lytes (MIRALAX) PACKET 1 Packet  1 Packet       PHYSICAL EXAM:     VITAL SIGNS:   height is 1.676 m (5' 6\") and weight is 91.7 kg (202 lb 1.6 oz). Her " oral temperature is 37 °C (98.6 °F). Her blood pressure is 109/69 and her pulse is 54 (abnormal). Her respiration is 17 and oxygen saturation is 98%.     General: well-groomed sitting up in no acute distress, no visitors present  Eyes: no scleral icterus or conjunctival injection  Ears, nose, mouth and throat: moist oral mucosa  Cardiovascular: regular rate, good peripheral perfusion, negative Isidoro sign bilaterally  Respiratory: breathing comfortably without use of accessory muscles  Gastrointestinal: soft, nontender, nondistended  Genitourinary: no indwelling baker  Musculoskeletal: good symmetry in bilateral shoulders, tender to palpation in anterolateral aspect of right ankle with swelling, minimal tenderness to posterolateral aspect of right ankle with no swelling  Skin: incision site visualized - mild serosanguinous drainage, staples in place    Neurologic:  Mental status:  A&Ox4 (person, place, date, situation) answers questions appropriately follows commands  Speech: fluent, no aphasia or dysarthria    Tone: no spasticity noted  Psychiatric: appropriate affect  Hematologic/lymphatic/immunologic: no IV access, +bruises seen on exposed skin (previous IV access spots)    LABS:  Recent Labs     05/02/22  0406 05/04/22  0554   SODIUM 132* 137   POTASSIUM 4.1 4.7   CHLORIDE 100 102   CO2 25 25   GLUCOSE 106* 97   BUN 14 16   CREATININE 0.59 0.54   CALCIUM 8.8 9.1     Recent Labs     05/02/22  0406 05/03/22  0452 05/04/22  0554   WBC 5.6 4.6* 4.3*   RBC 3.01* 2.93* 2.99*   HEMOGLOBIN 9.8* 9.3* 9.5*   HEMATOCRIT 29.4* 28.2* 29.0*   MCV 97.7 96.2 97.0   MCH 32.6 31.7 31.8   MCHC 33.3* 33.0* 32.8*   RDW 50.3* 49.7 49.8   PLATELETCT 62* 66* 76*   MPV 9.9 10.3 10.3           PRIMARY REHAB DIAGNOSIS:    This patient is a 72 y.o. female admitted for acute inpatient rehabilitation with Fracture of femoral neck, right, closed (HCC).    IMPAIRMENTS:   ADLs/IADLs  Mobility    SECONDARY DIAGNOSIS/MEDICAL CO-MORBIDITIES  AFFECTING FUNCTION:  -pancytopenia  -Dizziness with standing  -Constipation  -Postoperative anemia    RELEVANT CHANGES SINCE PREADMISSION EVALUATION:    Status unchanged    The patient's rehabilitation potential is Good  The patient's medical prognosis is good    PLAN:   Discussion and Recommendations:   1. The patient requires an acute inpatient rehabilitation program with a coordinated program of care at an intensity and frequency not available at a lower level of care. This recommendation is substantiated by the patient's medical physicians who recommend that the patient's intervention and assessment of medical issues needs to be done at an acute level of care for patient's safety and maximum outcome.   2. A coordinated program of care will be supplied by an interdisciplinary team of physical therapy, occupational therapy, rehab physician, rehab nursing, and, if needed, speech therapy and rehab psychology. Rehab team presents a patient-specific rehabilitation and education program concentrating on prevention of future problems related to accessibility, mobility, skin, bowel, bladder, sexuality, and psychosocial and medical/surgical problems.   3. Need for Rehabilitation Physician: The rehab physician will be evaluating the patient on a multi-weekly basis to help coordinate the program of care. The rehab physician communicates between medical physicians, therapists, and nurses to maximize the patient's potential outcome. Specific areas in which the rehab physician will be providing daily assessment include the following:   A. Assessing the patient's heart rate and blood pressure response (vitals monitoring) to activity and making adjustments in medications or conservative measures as needed.   B. The rehab physician will be assessing the frequency at which the program can be increased to allow the patient to reach optimal functional outcome.   C. The rehab physician will also provide assessments in daily skin  care, especially in light of patient's impairments in mobility.   D. The rehab physician will provide special expertise in understanding how to work with functional impairment and recommend appropriate interventions, compensatory techniques, and education that will facilitate the patient's outcome.   4. Rehab R.N.   The rehab RN will be working with patient to carry over in room mobility and activities of daily living when the patient is not in 3 hours of skilled therapy. Rehab nursing will be working in conjunction with rehab physician to address all the medical issues above and continue to assess laboratory work and discuss abnormalities with the treating physicians, assess vitals, and response to activity, and discuss and report abnormalities with the rehab physician. Rehab RN will also continue daily skin care, supervise bladder/bowel program, instruct in medication administration, and ensure patient safety.   5. Rehab Therapy: Therapies to treat at intensity and frequency of (may change after completion of evaluation by all therapeutic disciplines):       PT:  Physical therapy to address mobility, transfer, gait training and evaluation for adaptive equipment needs 1-1.5 hour/day at least 5 days/week for the duration of the ELOS (see below)       OT:  Occupational therapy to address ADLs, self-care, home management training, functional mobility/transfers and assistive device evaluation, and community re-integration 1 -1.5 hour/day at least 5 days/week for the duration of the ELOS (see below).     Medical management / Rehabilitation Issues/ Adverse Potential as part of rehabilitation plan     REHABILITATION ISSUES/ADVERSE POTENTIAL and MEDICAL CO-MORBIDITIES/ADVERSE POTENTIAL AFFECTING FUNCTION:    ##MSK  #Impaired ADLs and mobility  Patient is admitted to [facility] for comprehensive rehabilitation therapy as described.   Rehabilitation nursing monitors bowel and bladder control, educates on  medication administration, co-morbidities and monitors patient safety.  Estimated length of stay: 5-14 days  Anticipated discharge date: TBD  Anticipated discharge destination: home with spouse  Prognosis: good  Equipment: TBD  Postdischarge therapies: TBD  Followup: orthopedic surgery (Dr. Cassius Zuniga MD)   Code: full resuscitation    #Posttraumatic pain, acute, improving  #Postsurgical pain, acute, improving  Continue tylenol 1000mg TID, lidocaine patch 5% one patch daily, robaxin 750mg TID PRN spasms, oxycodone 2.5-5mg Q4hr PRN pain, naproxen 250mg BID  Encourage ice to affected locations    #Acute impacted transcervical right femoral neck fracture s/p Open treatment with internal fixation of right femoral neck fracture on 4/29/2022 by Dr. Cassius Zuniga MD  #Acute right distal radial metaphyseal fracture s/p Closed treatment without manipulation  #old right healed pubic rami fractures with bone remodeling  toe-touch weightbearing of right lower limb and nonweight bearing of right wrist (ok for platform walker)     ##NEURO  Monitor    #RESP  Respiratory therapy: RT performs O2 management prn, breathing retraining, pulmonary hygiene and bronchospasm management prn to optimize participation in therapies.     ##CARDIAC  DVT prophylaxis:  Patient is on lovenox 40mg SC daily for anticoagulation upon transfer. Encourage OOB. Monitor daily for signs and symptoms of DVT including but not limited to swelling and pain to prevent the development of DVT that may interfere with therapies.    ##GI  GI prophylaxis:  On prilosec 20mg daily to prevent gastritis/dyspepsia which may interfere with therapies.  #At risk of opioid induced constipation  Goal of one continent BM daily  Continue miralax 17g BID    #Nutrition/Dysphagia: Dietician monitors nutrient intake, recommend supplements prn and provide nutrition education to pt/family to promote optimal nutrition for wound healing/recovery.   Orders Placed This Encounter    Procedures   • Diet Order Diet: Regular     Standing Status:   Standing     Number of Occurrences:   1     Order Specific Question:   Diet:     Answer:   Regular [1]   Ordered multivitamin daily    ##/RENAL  #At risk for urinary retention  timed voids Q3-4hr while awake followed by checking postvoid residual to ensure complete emptying. If PVR>200mL, intermittent catheterization    ##HEME  #Pancytopenia  Vitamin B12 496  iron panel, ferritin, Vitamin D 25 OH wnl  Platelets mildly improving - recheck Friday  Monitor    ##SKIN  Skin/dermal ulcer prophylaxis: Monitor for new skin conditions with q.2 h. turns as required to prevent the development of skin breakdown.     Clement Ugarte, DO  Physical Medicine and Rehabilitation

## 2022-05-04 NOTE — CARE PLAN
"  Problem: Pain - Standard  Goal: Alleviation of pain or a reduction in pain to the patient’s comfort goal  Note: Pain is manageable with scheduled medication at this time.Repositioned with pillows for comfort.Will continue to monitor and assess pain level and medicate as needed.     Problem: Fall Risk - Rehab  Goal: Patient will remain free from falls  Note: Em [place] Fall risk Assessment Score: 12    Moderate fall risk Interventions  - Bed and strip alarm   - Yellow sign by the door   - Yellow wrist band \"Fall risk\"  - Room near to the nurse station  - Do not leave patient unattended in the bathroom  - Fall risk education provided        Problem: Bowel Elimination  Goal: Patient will participate in bowel management program  Note: Scheduled miralax given at hs.Offered prn bowel medication, pt refused.Education given on the importance of bowel medication.LBM 4/29 per report.         "

## 2022-05-04 NOTE — DISCHARGE PLANNING
CASE MANAGEMENT INITIAL ASSESSMENT    Admit Date:  5/3/2022     Reviewed chart.  PT is in isolation to R/O covid  Patient is a 72 y.o. female transferred from [facility] where she was hospitalized form 4/29 to 5/3.      Prior Living Situation:   2SH w/ 2 JAMES; 12 steps to negotiate stairs; bedroom and bathroom are on 1st floor.     Dc needs: Anticipate platform walker on R; home health vs out pt; follow up with Dr Zuniga Ortho; pt will need to establish w/ a PCP       Diagnosis: Hip fracture requiring operative repair, right, closed, with routine healing, subsequent encounter [S72.001D]    Co-morbidities:   Patient Active Problem List    Diagnosis Date Noted   • Hip fracture requiring operative repair, right, closed, with routine healing, subsequent encounter 05/03/2022   • Hyponatremia 05/02/2022   • Pancytopenia (HCC) 05/01/2022   • Closed fracture of distal end of right radius 04/30/2022   • Obesity (BMI 30-39.9) 04/30/2022   • Macrocytosis 04/30/2022   • Fracture of femoral neck, right, closed (HCC) 04/29/2022     Prior Living Situation:  Housing / Facility: 2 Story Apartment / Condo (2 JAMES; bedroom/bath on 1st floor; walk in shower; 12 steps to 2nd story)  Lives with - Patient's Self Care Capacity: Spouse Anthony Miller     Prior Level of Function:  Medication Management: Independent  Finances: Independent  Home Management: Independent  Shopping: Independent  Prior Level Of Mobility: Independent Without Device in Community  Driving / Transportation: Driving Independent    Support Systems:  Primary : Anthony Miller Spouse  Other support systems: unknown  Advance Directives: No  Power of  (Name & Phone): none    Previous Services Utilized:   Equipment Owned: Front-Wheel Walker,Single Point Cane,Crutches  Prior Services: None    Other Information:  Occupation (Pre-Hospital Vocational): Retired Due To Age  Primary Payor Source: Medicare A  Secondary Payor Source:   ([employer])  Primary Care Practitioner : none  Other MDs: Dr Cassius Zuniga    Patient / Family Goal:  Return home.      Plan:  1. IDT ; anticipate short stay @ [facility]; follow up with pt.   1. Continue to follow patient through hospitalization and provide discharge planning in collaboration with patient, family, physicians and ancillary services.     2. Utilize community resources to ensure a safe discharge.

## 2022-05-04 NOTE — THERAPY
Occupational Therapy   Initial Evaluation     Patient Name: Carmita Miller  Age:  72 y.o., Sex:  female  Medical Record #: [id number]  Today's Date: 5/4/2022     Subjective    Pt seated in w/c upon arrival, pleasant and cooperative, agreeable to therapy and shower     Objective       05/04/22 0831   Prior Living Situation   Prior Services Home-Independent   Housing / Facility 2 Story House   Steps Into Home   (flight of stairs)   Steps In Home 2   Bathroom Set up Walk In Shower   Equipment Owned Front-Wheel Walker;Single Point Cane;Crutches   Lives with - Patient's Self Care Capacity Spouse;Other (Comments)   Comments pt reports living in [place] with spouse and brother (has TBI - requires supervision)   Prior Level of ADL Function   Self Feeding Independent   Grooming / Hygiene Independent   Bathing Independent   Dressing Independent   Toileting Independent   Prior Level of IADL Function   Medication Management Independent   Laundry Independent   Kitchen Mobility Independent   Finances Independent   Home Management Independent   Shopping Independent   Prior Level Of Mobility Independent Without Device in Community;Independent Without Device in Home   Driving / Transportation Driving Independent   Occupation (Pre-Hospital Vocational) 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)   Leisure Interests Pets   Prior Functioning: Everyday Activities   Self Care Independent   Indoor Mobility (Ambulation) Independent   Stairs Independent   Functional Cognition Independent   Prior Device Use None of the given options   Pain 0 - 10 Group   Therapist Pain Assessment   (RN provided meds prior to session - pt reported little pain d/t having meds)   Cognition    Cognition / Consciousness WDL   Level of Consciousness Alert   ABS (Agitated Behavior Scale)   Agitated Behavior Scale Performed No   Cognitive Pattern Assessment   Cognitive Pattern Assessment Used BIMS   Brief Interview for Mental Status (BIMS)   Repetition of Three Words (First  "Attempt) 3   Temporal Orientation: Year Correct   Temporal Orientation: Month Accurate within 5 days   Temporal Orientation: Day Correct   Recall: \"Sock\" Yes, no cue required   Recall: \"Blue\" Yes, no cue required   Recall: \"Bed\" Yes, no cue required   BIMS Summary Score 15   Passive ROM Upper Body   Passive ROM Upper Body X   Comments R lower arm in cast - wrist immobilized. Digits are able to move freely   Active ROM Upper Body   Active ROM Upper Body  X   Dominant Hand Right   Comments R lower arm in cast - wrist immobilized. Digits are able to move freely   Strength Upper Body   Upper Body Strength  X   Comments RUE weakness d/t immobilized fx wrist. LUE WFL   Sensation Upper Body   Upper Extremity Sensation  WDL   Comments NT - pt reported no issues   Upper Body Muscle Tone   Upper Body Muscle Tone  WDL   Balance Assessment   Sitting Balance (Static) Good   Sitting Balance (Dynamic) Fair +   Standing Balance (Static) Fair -   Standing Balance (Dynamic) Fair -   Weight Shift Sitting Fair   Weight Shift Standing Poor   Bed Mobility    Sit to Stand Minimal Assist   Coordination Upper Body   Coordination X   Fine Motor Coordination restricted R lower arm mobility   Gross Motor Coordination restricted R lower arm mobility   Eating   Assistance Needed Supervision   Physical Assistance Level No physical assistance   CARE Score - Eating 4   Eating Discharge Goal   Discharge Goal 6   Oral Hygiene   Assistance Needed Supervision   Physical Assistance Level No physical assistance   CARE Score - Oral Hygiene 4   Oral Hygiene Discharge Goal   Discharge Goal 6   Shower/Bathe Self   Assistance Needed Physical assistance   Physical Assistance Level 51%-75%   CARE Score - Shower/Bathe Self 2   Shower/Bathe Self Discharge Goal   Discharge Goal 4   Upper Body Dressing   Assistance Needed Physical assistance   Physical Assistance Level 25% or less   CARE Score - Upper Body Dressing 3   Upper Body Dressing Discharge Goal   Discharge " Goal 5   Lower Body Dressing   Assistance Needed Physical assistance   Physical Assistance Level 26%-50%   CARE Score - Lower Body Dressing 3   Lower Body Dressing Discharge Goal   Discharge Goal 4   Putting On/Taking Off Footwear   Assistance Needed Physical assistance   Physical Assistance Level Total assistance   CARE Score - Putting On/Taking Off Footwear 1   Putting On/Taking Off Footwear Discharge Goal   Discharge Goal 4   Toileting Hygiene   Assistance Needed Physical assistance   Physical Assistance Level 76% or more   CARE Score - Toileting Hygiene 2   Toileting Hygiene Discharge Goal   Discharge Goal 4   Toilet Transfer   Assistance Needed Physical assistance   Physical Assistance Level 26%-50%   CARE Score - Toilet Transfer 3   Toilet Transfer Discharge Goal   Discharge Goal 4   Functional Level of Assist   Eating Stand by Assist  (set-up assist)   Grooming Standby Assist;Seated  (set-up assist)   Bathing Minimal Assist  (assist with feet, buttocks)   Upper Body Dressing Minimal Assist  (pull shirt down)   Lower Body Dressing Moderate Assist  (assist with socks, pt able to don/doff brief)   Toileting Maximal Assist  (assist with clothing mgmt)   Toilet Transfers Minimal Assist  (stand pivot w/c<>Toilet with GB)   Tub / Shower Transfers Minimal Assist  (stand pivot w/c<>Fold down bench with GB)   Comprehension Independent   Expression Independent   Problem Solving Modified Independent   Memory Modified Independent   Problem List   Problem List Decreased Upper Extremity Strength Right;Decreased Upper Extremity AROM Right;Decreased Upper Extremity PROM Right;Decreased Active Daily Living Skills;Decreased Homemaking Skills;Decreased Functional Mobility;Decreased Activity Tolerance;Impaired Coordination Right Upper Extremity;Impaired Postural Control / Balance;Limited Knowledge of Post Op Precautions   Precautions   Precautions Fall Risk;Toe Touch Weight Bearing Right Lower Extremity;Platform Weight Bearing  Right Upper Extremity   Current Discharge Plan   Current Discharge Plan Return to Prior Living Situation   Benefit    Therapy Benefit Patient Would Benefit from Inpatient Rehab Occupational Therapy to Maximize [place] with ADLs, IADLs and Functional Mobility.   Interdisciplinary Plan of Care Collaboration   IDT Collaboration with  Physical Therapist;Nursing   Patient Position at End of Therapy Seated;Call Light within Reach;Tray Table within Reach  (handoff to PT)   Collaboration Comments re: CLOF   Equipment Needs   Assistive Device / DME Grab Bars By Toilet;Grab Bars In Shower / Tub;Shower Chair;Platform Walker   Adaptive Equipment Reacher;Sock Aide;Long Handled Shoe Horn   Strengths & Barriers   Strengths Able to follow instructions;Alert and oriented;Effective communication skills;Good insight into deficits/needs;Independent prior level of function;Making steady progress towards goals;Manages pain appropriately;Motivated for self care and independence;Pleasant and cooperative;Supportive family;Willingly participates in therapeutic activities   Barriers Decreased endurance;Generalized weakness;Impaired balance;Impaired activity tolerance;Home accessibility;Pain   OT Total Time Spent   OT Individual Total Time Spent (Mins) 90   OT Charge Group   Charges Yes   OT Self Care / ADL 3   OT Evaluation OT Evaluation Mod       Assessment  Patient is 72 y.o. female with a diagnosis of Acute impacted transcervical right femoral neck fracture s/p Open treatment with internal fixation of right femoral neck fracture on 4/29/2022 by Dr. Cassius Zuniga MD. Acute right distal radial metaphyseal fracture s/p Closed treatment without manipulation.  Additional factors influencing patient status / progress (ie: cognitive factors, co-morbidities, social support, etc): Pt presents with poor standing balance, toe-touch weightbearing of right lower limb and nonweight bearing of right wrist (ok for platform walker), restricted  active/passive mobility at RUE, and decrease strength/activity tolerance/coordination that impacts full participation with ADL's and functional mobility.      Plan  Recommend Occupational Therapy  minutes per day 5-7 days per week for 7 days for the following treatments:  OT Group Therapy, OT Self Care/ADL, OT Manual Ther Technique, OT Neuro Re-Ed/Balance, OT Therapeutic Activity and OT Therapeutic Exercise.    Passport items to be completed:  Perform bathroom transfers, complete dressing, complete feeding, get ready for the day, prepare a simple meal, participate in household tasks, adapt home for safety needs, demonstrate home exercise program, complete caregiver training     Goals:  Long term and short term goals have been discussed with patient and they are in agreement.    Occupational Therapy Goals (Active)       Problem: Dressing       Dates: Start: 05/04/22         Goal: STG-Within one week, patient will dress UB with supervision       Dates: Start: 05/04/22            Goal: STG-Within one week, patient will dress LB with Min A using DME/AE as needed       Dates: Start: 05/04/22               Problem: Functional Transfers       Dates: Start: 05/04/22         Goal: STG-Within one week, patient will transfer to step in shower with Mod A using DME/AE as needed       Dates: Start: 05/04/22               Problem: OT Long Term Goals       Dates: Start: 05/04/22         Goal: LTG-By discharge, patient will complete basic self care tasks with supervision using DME/AE as needed       Dates: Start: 05/04/22            Goal: LTG-By discharge, patient will perform bathroom transfers with CGA using DME/AE as needed       Dates: Start: 05/04/22

## 2022-05-04 NOTE — CARE PLAN
The patient is Watcher - Medium risk of patient condition declining or worsening    Shift Goals  Clinical Goals: Safety, Pain control      Problem: Bowel Elimination  Goal: Patient will participate in bowel management program  Note: Patient is continent of bowel, received scheduled miralax, last BM 5/4, no abdominal discomfort at this time, will continue to monitor.      Problem: Skin Integrity  Goal: Patient's skin integrity will be maintained or improve  Note: Right hip incision has been redressed, scant drainage noted, will continue to monitor.       DC instructions

## 2022-05-04 NOTE — THERAPY
Physical Therapy   Initial Evaluation     Patient Name: Carmita Miller  Age:  72 y.o., Sex:  female  Medical Record #: [id number]  Today's Date: 5/4/2022     Subjective    Pt was seated in w/c upon arrival and agreeable to treatment.      Objective     05/04/22 1001   Prior Living Situation   Prior Services None   Housing / Facility 2 Story House   Steps Into Home 2  (Pt's spouse will build ramp)   Steps In Home 13  (6, landing, 7)   Rail Both Rail (Steps in Home)  (L rail on 1st set, landing,  R rail on 2nd set)   Elevator No   Equipment Owned Front-Wheel Walker;Single Point Cane   Lives with - Patient's Self Care Capacity Spouse;Other (Comments)  (Brother)   Comments Pt lives with spouse and brother with baseline TBI requiring SPV   Prior Level of Functional Mobility   Bed Mobility Independent   Transfer Status Independent   Ambulation Independent   Distance Ambulation (Feet)   (Community)   Assistive Devices Used None   Stairs Independent   Prior Functioning: Everyday Activities   Self Care Independent   Indoor Mobility (Ambulation) Independent   Stairs Independent   Functional Cognition Independent   Prior Device Use None of the given options   Pain 0 - 10 Group   Location Hip;Wrist   Therapist Pain Assessment Post Activity Pain Same as Prior to Activity;4;Nurse Notified   Cognition    Level of Consciousness Alert   ABS (Agitated Behavior Scale)   Agitated Behavior Scale Performed No   Passive ROM Lower Body   Passive ROM Lower Body WDL   Active ROM Lower Body    Active ROM Lower Body  WDL   Strength Lower Body   Rt Hip Flexion Strength 2+ (P+)   Rt Knee Flexion Strength 4 (G)   Rt Knee Extension Strength 4 (G)   Rt Ankle Dorsiflexion Strength 5 (N)   Lt Hip Flexion Strength 4 (G)   Lt Knee Flexion Strength 5 (N)   Lt Knee Extension Strength 5 (N)   Lt Ankle Dorsiflexion Strength 5 (N)   Sensation Lower Body   Lower Extremity Sensation   WDL   Comments Intact BLE light touch   Bed Mobility    Supine to Sit Minimal  Assist   Sit to Supine Minimal Assist   Sit to Stand Moderate Assist   Scooting Contact Guard Assist   Rolling Supervised   Roll Left and Right   Assistance Needed Supervision   CARE Score - Roll Left and Right 4   Roll Left and Right Discharge Goal   Discharge Goal 6   Sit to Lying   Assistance Needed Physical assistance   Physical Assistance Level 26%-50%   CARE Score - Sit to Lying 3   Sit to Lying Discharge Goal   Discharge Goal 6   Lying to Sitting on Side of Bed   Assistance Needed Physical assistance   Physical Assistance Level 26%-50%   CARE Score - Lying to Sitting on Side of Bed 3   Lying to Sitting on Side of Bed Discharge Goal   Discharge Goal 6   Sit to Stand   Assistance Needed Physical assistance   Physical Assistance Level 51%-75%   CARE Score - Sit to Stand 2   Sit to Stand Discharge Goal   Discharge Goal 6   Chair/Bed-to-Chair Transfer   Assistance Needed Physical assistance   Physical Assistance Level 26%-50%   CARE Score - Chair/Bed-to-Chair Transfer 3   Chair/Bed-to-Chair Transfer Discharge Goal   Discharge Goal 6   Car Transfer   Reason if not Attempted Environmental limitations   CARE Score - Car Transfer 10   Car Transfer Discharge Goal   Discharge Goal 4   Walk 10 Feet   Reason if not Attempted Safety concerns   CARE Score - Walk 10 Feet 88   Walk 10 Feet Discharge Goal   Discharge Goal 6   Walk 50 Feet with Two Turns   Reason if not Attempted Safety concerns   CARE Score - Walk 50 Feet with Two Turns 88   Walk 50 Feet with Two Turns Discharge Goal   Discharge Goal 6   Walk 150 Feet   Reason if not Attempted Safety concerns   CARE Score - Walk 150 Feet 88   Walk 150 Feet Discharge Goal   Discharge Goal 6   Walking 10 Feet on Uneven Surfaces   Reason if not Attempted Safety concerns   CARE Score - Walking 10 Feet on Uneven Surfaces 88   Walking 10 Feet on Uneven Surfaces Discharge Goal   Discharge Goal 6   1 Step (Curb)   Reason if not Attempted Activity not applicable  (d/t WBing status)    CARE Score - 1 Step (Curb) 9   1 Step (Curb) Discharge Goal   Discharge Goal 9   4 Steps   Reason if not Attempted Activity not applicable  (d/t WBing status)   CARE Score - 4 Steps 9   4 Steps Discharge Goal   Discharge Goal 9   12 Steps   Reason if not Attempted Activity not applicable  (d/t WBing status)   CARE Score - 12 Steps 9   Picking Up Object   Reason if not Attempted Safety concerns   CARE Score - Picking Up Object 88   Picking Up Object Discharge Goal   Discharge Goal 6   Wheel 50 Feet with Two Turns   Reason if not Attempted Activity not applicable   CARE Score - Wheel 50 Feet with Two Turns 9   Wheel 50 Feet with Two Turns Discharge Goal   Discharge Goal 9   Wheel 150 Feet   Reason if not Attempted Activity not applicable   CARE Score - Wheel 150 Feet 9   Wheel 150 Feet Discharge Goal   Discharge Goal 9   Gait Functional Level of Assist    Gait Level Of Assist Minimal Assist   Assistive Device Platform Walker   Distance (Feet) 2   # of Times Distance was Traveled 1   Deviation Decreased Heel Strike;Decreased Toe Off;Bradykinetic;Step To  (TTWBing RLE)   Wheelchair Functional Level of Assist   Wheelchair Assist   (N/A)   Stairs Functional Level of Assist   Level of Assist with Stairs Unable to Participate   Transfer Functional Level of Assist   Bed, Chair, Wheelchair Transfer Minimal Assist   Bed Chair Wheelchair Transfer Description Increased time;Set-up of equipment;Supervision for safety;Squat pivot transfer to wheelchair;Verbal cueing   Problem List    Problems Pain;Impaired Bed Mobility;Impaired Transfers;Impaired Ambulation;Functional Strength Deficit;Impaired Balance;Decreased Activity Tolerance   Precautions   Precautions Fall Risk;Toe Touch Weight Bearing Right Lower Extremity;Non Weight Bearing Right Upper Extremity   Current Discharge Plan   Current Discharge Plan Return to Prior Living Situation   Interdisciplinary Plan of Care Collaboration   IDT Collaboration with  Occupational  Therapist;Nursing   Patient Position at End of Therapy In Bed;Call Light within Reach;Tray Table within Reach;Phone within Reach   Collaboration Comments CLOF   Benefit   Therapy Benefit Patient Would Benefit from Inpatient Rehabilitation Physical Therapy to Maximize Functional [place] with ADLs, IADLs and Mobility.   Strengths & Barriers   Strengths Able to follow instructions;Effective communication skills;Independent prior level of function;Motivated for self care and independence;Pleasant and cooperative;Supportive family;Willingly participates in therapeutic activities   Barriers Decreased endurance;Fatigue;Generalized weakness;Home accessibility;Impaired activity tolerance;Impaired balance;Limited mobility;Pain;Pain poorly managed   PT Total Time Spent   PT Individual Total Time Spent (Mins) 90   PT Charge Group   PT Therapeutic Activities 3   PT Evaluation PT Evaluation Mod     Pt educated on PT role, PT POC, rehab orientation.  Pt given leg  with bed mobility training with pt able to progress from min A to SBA with AE.  Pt given ramp specs, ice to R hip/ankle, and completed ace wrap to R ankle for compression and pain management.      Assessment  Patient is 72 y.o. female with a diagnosis of GLF resulting in ORIF of R hip and R wrist fx.  Additional factors influencing patient status / progress (ie: cognitive factors, co-morbidities, social support, etc): independent PLOF, good social support, PMH of bilateral knee arthritis, WBing status.      Plan  Recommend Physical Therapy  minutes per day 5-7 days per week for 7-10 days for the following treatments:  PT Group Therapy, PT E Stim Attended, PT Gait Training, PT Therapeutic Exercises, PT Neuro Re-Ed/Balance, PT Aquatic Therapy, PT Therapeutic Activity, PT Manual Therapy, and PT Evaluation.    Passport items to be completed:  Get in/out of bed safely, in/out of a vehicle, safely use mobility device, walk or wheel around home/community,  navigate up and down stairs, show how to get up/down from the ground, ensure home is accessible, demonstrate HEP, complete caregiver training    Goals:  Long term and short term goals have been discussed with patient and they are in agreement.    Physical Therapy Problems (Active)       Problem: Mobility       Dates: Start: 05/04/22         Goal: STG-Within one week, patient will propel wheelchair community x 50 feet with L LE/L UE and SBA       Dates: Start: 05/04/22            Goal: STG-Within one week, patient will ambulate household distance x 20 feet with PFWW and min A       Dates: Start: 05/04/22               Problem: Mobility Transfers       Dates: Start: 05/04/22         Goal: STG-Within one week, patient will transfer bed to chair with PFWW and SBA       Dates: Start: 05/04/22               Problem: PT-Long Term Goals       Dates: Start: 05/04/22         Goal: LTG-By discharge, patient will ambulate x 50 feet with PFWW, mod I        Dates: Start: 05/04/22            Goal: LTG-By discharge, patient will transfer one surface to another with PFWW, mod I        Dates: Start: 05/04/22            Goal: LTG-By discharge, patient will transfer in/out of a car with PFWW and SPV       Dates: Start: 05/04/22

## 2022-05-05 PROCEDURE — 97110 THERAPEUTIC EXERCISES: CPT

## 2022-05-05 PROCEDURE — 700101 HCHG RX REV CODE 250: Performed by: PHYSICAL MEDICINE & REHABILITATION

## 2022-05-05 PROCEDURE — A9270 NON-COVERED ITEM OR SERVICE: HCPCS | Performed by: PHYSICAL MEDICINE & REHABILITATION

## 2022-05-05 PROCEDURE — 97116 GAIT TRAINING THERAPY: CPT

## 2022-05-05 PROCEDURE — 700102 HCHG RX REV CODE 250 W/ 637 OVERRIDE(OP): Performed by: PHYSICAL MEDICINE & REHABILITATION

## 2022-05-05 PROCEDURE — 770010 HCHG ROOM/CARE - REHAB SEMI PRIVAT*

## 2022-05-05 PROCEDURE — 97530 THERAPEUTIC ACTIVITIES: CPT

## 2022-05-05 PROCEDURE — 700111 HCHG RX REV CODE 636 W/ 250 OVERRIDE (IP): Performed by: PHYSICAL MEDICINE & REHABILITATION

## 2022-05-05 PROCEDURE — 97535 SELF CARE MNGMENT TRAINING: CPT

## 2022-05-05 PROCEDURE — 99232 SBSQ HOSP IP/OBS MODERATE 35: CPT | Performed by: PHYSICAL MEDICINE & REHABILITATION

## 2022-05-05 RX ADMIN — ACETAMINOPHEN 1000 MG: 500 TABLET, FILM COATED ORAL at 15:45

## 2022-05-05 RX ADMIN — MULTIPLE VITAMINS W/ MINERALS TAB 1 TABLET: TAB at 11:48

## 2022-05-05 RX ADMIN — OMEPRAZOLE 20 MG: 20 CAPSULE, DELAYED RELEASE ORAL at 08:05

## 2022-05-05 RX ADMIN — ACETAMINOPHEN 1000 MG: 500 TABLET, FILM COATED ORAL at 08:05

## 2022-05-05 RX ADMIN — OXYCODONE 5 MG: 5 TABLET ORAL at 12:46

## 2022-05-05 RX ADMIN — ENOXAPARIN SODIUM 40 MG: 40 INJECTION SUBCUTANEOUS at 08:06

## 2022-05-05 RX ADMIN — LIDOCAINE 1 PATCH: 50 PATCH TOPICAL at 08:06

## 2022-05-05 RX ADMIN — OXYCODONE 5 MG: 5 TABLET ORAL at 03:42

## 2022-05-05 RX ADMIN — POLYETHYLENE GLYCOL 3350 1 PACKET: 17 POWDER, FOR SOLUTION ORAL at 08:06

## 2022-05-05 RX ADMIN — NAPROXEN 250 MG: 500 TABLET ORAL at 20:17

## 2022-05-05 RX ADMIN — NAPROXEN 250 MG: 500 TABLET ORAL at 08:06

## 2022-05-05 RX ADMIN — ACETAMINOPHEN 1000 MG: 500 TABLET, FILM COATED ORAL at 20:17

## 2022-05-05 RX ADMIN — OXYCODONE 2.5 MG: 5 TABLET ORAL at 08:06

## 2022-05-05 ASSESSMENT — GAIT ASSESSMENTS
ASSISTIVE DEVICE: PLATFORM WALKER
DEVIATION: ANTALGIC;STEP TO;DECREASED BASE OF SUPPORT;BRADYKINETIC;DECREASED HEEL STRIKE
GAIT LEVEL OF ASSIST: CONTACT GUARD ASSIST

## 2022-05-05 NOTE — CARE PLAN
The patient is Watcher - Medium risk of patient condition declining or worsening    Shift Goals  Clinical Goals: Safety, Pain control      Problem: Pain - Standard  Goal: Alleviation of pain or a reduction in pain to the patient’s comfort goal  Note: Patient having 6/10 right hip and leg pain, PRN oxy and scheduled pain meds have been administered, ice packs applied to right hip and foot, will continue to monitor.      Problem: Bladder / Voiding  Goal: Patient will establish and maintain regular urinary output  Note: Patient has been continent of void, output clear and yellow, no dysuria noted, will continue to monitor.

## 2022-05-05 NOTE — CARE PLAN
Problem: Fall Risk - Rehab  Goal: Patient will remain free from falls  Note: [place] Fall risk Assessment Score: 9    Low fall risk interventions   - Call light within reach   - Yellow  socks   - Belongings within reach   - Bed in the lowest position     Appropriately uses call light to make needs known.Will continue to monitor and assess needs and safety.         Problem: Bowel Elimination  Goal: Patient will participate in bowel management program  Note: Scheduled miralax given at hs.Continent of bowel per report.LBM 5/4.Will continue to monitor.

## 2022-05-05 NOTE — CARE PLAN
Problem: Dressing  Goal: STG-Within one week, patient will dress UB with supervision  Outcome: Not Met  Note: New admit - not yet addressed  Goal: STG-Within one week, patient will dress LB with Min A using DME/AE as needed  Outcome: Not Met  Note: New admit - not yet addressed     Problem: Functional Transfers  Goal: STG-Within one week, patient will transfer to step in shower with Mod A using DME/AE as needed  Outcome: Not Met  Note: New admit - not yet addressed

## 2022-05-05 NOTE — PROGRESS NOTES
REHABILITATION PROGRESS NOTE    Date of Admission: 5/3/2022    [facility] Code / Diagnosis to Support: 0008.4 - Orthopaedic Disorders: Status Post Major Multiple Fractures  Etiologic Diagnosis: Fracture of femoral neck, right, closed (HCC)    SUBJECTIVE  Patient seen in room resting comfortably. No visitors present  GI: Last BM 5/5, continent  : continent  Psych: slept better  MSK: pain improving    I have performed a physical exam and reviewed and updated history and plan today (5/5/2022).     I, Clement Ugarte D.O., was present and led the interdisciplinary team conference on 5/5/2022.  I led the IDT conference and agree with the IDT conference documentation and plan of care as noted below.   Continent in bowel and bladder. Min A for most ability. Went 2 feet because of her weight bearing status. Mod A for bathroom ADLs as patient has a small corner shower - which is main ADL barrier. Will need ramp, bed downstairs, commode over existing toilet, hip kit, bathroom modifications, shower chair. Needs letter for conservatorship.     MEDICATIONS:  Current Facility-Administered Medications   Medication Dose   • enoxaparin (Lovenox) inj 40 mg  40 mg   • methocarbamol (ROBAXIN) tablet 750 mg  750 mg   • Respiratory Therapy Consult     • Pharmacy Consult Request ...Pain Management Review 1 Each  1 Each   • omeprazole (PRILOSEC) capsule 20 mg  20 mg   • acetaminophen (TYLENOL) tablet 1,000 mg  1,000 mg    Followed by   • [START ON 5/8/2022] acetaminophen (TYLENOL) tablet 1,000 mg  1,000 mg   • lidocaine (LIDODERM) 5 % 1 Patch  1 Patch   • artificial tears ophthalmic solution 1 Drop  1 Drop   • benzocaine-menthol (Cepacol) lozenge 1 Lozenge  1 Lozenge   • mag hydrox-al hydrox-simeth (MAALOX PLUS ES or MYLANTA DS) suspension 20 mL  20 mL   • ondansetron (ZOFRAN ODT) dispertab 4 mg  4 mg    Or   • ondansetron (ZOFRAN) syringe/vial injection 4 mg  4 mg   • sodium chloride (OCEAN) 0.65 % nasal spray 2 Spray  2 Spray   •  "therapeutic multivitamin-minerals (THERAGRAN-M) tablet 1 Tablet  1 Tablet   • melatonin tablet 3 mg  3 mg   • magnesium hydroxide (MILK OF MAGNESIA) suspension 30 mL  30 mL   • oxyCODONE immediate-release (ROXICODONE) tablet 2.5-5 mg  2.5-5 mg   • naproxen (NAPROSYN) tablet 250 mg  250 mg   • polyethylene glycol/lytes (MIRALAX) PACKET 1 Packet  1 Packet       PHYSICAL EXAM:     VITAL SIGNS:   height is 1.676 m (5' 6\") and weight is 91.7 kg (202 lb 1.6 oz). Her oral temperature is 36.3 °C (97.4 °F). Her blood pressure is 116/68 and her pulse is 50 (abnormal). Her respiration is 18 and oxygen saturation is 97%.     General: well-groomed sitting up in no acute distress, no visitors present  Eyes: no scleral icterus or conjunctival injection  Ears, nose, mouth and throat: moist oral mucosa  Cardiovascular: good peripheral perfusion  Respiratory: breathing comfortably without use of accessory muscles  Gastrointestinal: nondistended  Genitourinary: no indwelling baker  Neurologic:  Mental status:  A&Ox4 (person, place, date, situation) answers questions appropriately follows commands  Speech: fluent, no aphasia or dysarthria    Tone: no spasticity noted  Psychiatric: appropriate affect  Hematologic/lymphatic/immunologic: no IV access    LABS:  Recent Labs     05/04/22  0554   SODIUM 137   POTASSIUM 4.7   CHLORIDE 102   CO2 25   GLUCOSE 97   BUN 16   CREATININE 0.54   CALCIUM 9.1     Recent Labs     05/03/22  0452 05/04/22  0554   WBC 4.6* 4.3*   RBC 2.93* 2.99*   HEMOGLOBIN 9.3* 9.5*   HEMATOCRIT 28.2* 29.0*   MCV 96.2 97.0   MCH 31.7 31.8   MCHC 33.0* 32.8*   RDW 49.7 49.8   PLATELETCT 66* 76*   MPV 10.3 10.3           PRIMARY REHAB DIAGNOSIS:    This patient is a 72 y.o. female admitted for acute inpatient rehabilitation with Fracture of femoral neck, right, closed (HCC).    IMPAIRMENTS:   ADLs/IADLs  Mobility    SECONDARY DIAGNOSIS/MEDICAL CO-MORBIDITIES AFFECTING FUNCTION:  -pancytopenia  -Dizziness with " standing  -Constipation  -Postoperative anemia    RELEVANT CHANGES SINCE PREADMISSION EVALUATION:    Status unchanged    The patient's rehabilitation potential is Good  The patient's medical prognosis is good    PLAN:   Discussion and Recommendations:   1. The patient requires an acute inpatient rehabilitation program with a coordinated program of care at an intensity and frequency not available at a lower level of care. This recommendation is substantiated by the patient's medical physicians who recommend that the patient's intervention and assessment of medical issues needs to be done at an acute level of care for patient's safety and maximum outcome.   2. A coordinated program of care will be supplied by an interdisciplinary team of physical therapy, occupational therapy, rehab physician, rehab nursing, and, if needed, speech therapy and rehab psychology. Rehab team presents a patient-specific rehabilitation and education program concentrating on prevention of future problems related to accessibility, mobility, skin, bowel, bladder, sexuality, and psychosocial and medical/surgical problems.   3. Need for Rehabilitation Physician: The rehab physician will be evaluating the patient on a multi-weekly basis to help coordinate the program of care. The rehab physician communicates between medical physicians, therapists, and nurses to maximize the patient's potential outcome. Specific areas in which the rehab physician will be providing daily assessment include the following:   A. Assessing the patient's heart rate and blood pressure response (vitals monitoring) to activity and making adjustments in medications or conservative measures as needed.   B. The rehab physician will be assessing the frequency at which the program can be increased to allow the patient to reach optimal functional outcome.   C. The rehab physician will also provide assessments in daily skin care, especially in light of patient's impairments in  mobility.   D. The rehab physician will provide special expertise in understanding how to work with functional impairment and recommend appropriate interventions, compensatory techniques, and education that will facilitate the patient's outcome.   4. Rehab R.N.   The rehab RN will be working with patient to carry over in room mobility and activities of daily living when the patient is not in 3 hours of skilled therapy. Rehab nursing will be working in conjunction with rehab physician to address all the medical issues above and continue to assess laboratory work and discuss abnormalities with the treating physicians, assess vitals, and response to activity, and discuss and report abnormalities with the rehab physician. Rehab RN will also continue daily skin care, supervise bladder/bowel program, instruct in medication administration, and ensure patient safety.   5. Rehab Therapy: Therapies to treat at intensity and frequency of (may change after completion of evaluation by all therapeutic disciplines):       PT:  Physical therapy to address mobility, transfer, gait training and evaluation for adaptive equipment needs 1-1.5 hour/day at least 5 days/week for the duration of the ELOS (see below)       OT:  Occupational therapy to address ADLs, self-care, home management training, functional mobility/transfers and assistive device evaluation, and community re-integration 1 -1.5 hour/day at least 5 days/week for the duration of the ELOS (see below).     Medical management / Rehabilitation Issues/ Adverse Potential as part of rehabilitation plan     REHABILITATION ISSUES/ADVERSE POTENTIAL and MEDICAL CO-MORBIDITIES/ADVERSE POTENTIAL AFFECTING FUNCTION:    ##MSK  #Impaired ADLs and mobility  Patient is admitted to [facility] for comprehensive rehabilitation therapy as described.   Rehabilitation nursing monitors bowel and bladder control, educates on medication administration, co-morbidities and monitors  patient safety.  Anticipated discharge date: 5/11/2022  Anticipated discharge destination: home with spouse  Prognosis: good  Equipment: TBD  Postdischarge therapies: TBD  Followup: orthopedic surgery (Dr. Cassius Zuniga MD)   Code: full resuscitation    #Posttraumatic pain, acute, improving  #Postsurgical pain, acute, improving  Continue tylenol 1000mg TID, lidocaine patch 5% one patch daily, robaxin 750mg TID PRN spasms, oxycodone 2.5-5mg Q4hr PRN pain, naproxen 250mg BID  Encourage ice to affected locations    #Acute impacted transcervical right femoral neck fracture s/p Open treatment with internal fixation of right femoral neck fracture on 4/29/2022 by Dr. Cassius Zuniga MD  #Acute right distal radial metaphyseal fracture s/p Closed treatment without manipulation  #old right healed pubic rami fractures with bone remodeling  toe-touch weightbearing of right lower limb and nonweight bearing of right wrist (ok for platform walker)     ##NEURO  Monitor    #RESP  Respiratory therapy: RT performs O2 management prn, breathing retraining, pulmonary hygiene and bronchospasm management prn to optimize participation in therapies.     ##CARDIAC  DVT prophylaxis:  Patient is on lovenox 40mg SC daily for anticoagulation upon transfer. Encourage OOB. Monitor daily for signs and symptoms of DVT including but not limited to swelling and pain to prevent the development of DVT that may interfere with therapies.    ##GI  GI prophylaxis:  On prilosec 20mg daily to prevent gastritis/dyspepsia which may interfere with therapies.  #At risk of opioid induced constipation  Goal of one continent BM daily  Continue miralax 17g BID    #Nutrition/Dysphagia: Dietician monitors nutrient intake, recommend supplements prn and provide nutrition education to pt/family to promote optimal nutrition for wound healing/recovery.   Orders Placed This Encounter   Procedures   • Diet Order Diet: Regular     Standing Status:   Standing     Number of  Occurrences:   1     Order Specific Question:   Diet:     Answer:   Regular [1]   Ordered multivitamin daily    ##/RENAL  #At risk for urinary retention  timed voids Q3-4hr while awake followed by checking postvoid residual to ensure complete emptying. If PVR>200mL, intermittent catheterization    #Mild hyperphosphatemia  Monitor    ##HEME  #Pancytopenia  Vitamin B12 496  iron panel, ferritin, Vitamin D 25 OH wnl  Platelets mildly improving - recheck Friday  Monitor    ##SKIN  Skin/dermal ulcer prophylaxis: Monitor for new skin conditions with q.2 h. turns as required to prevent the development of skin breakdown.     Clement Ugarte, DO  Physical Medicine and Rehabilitation

## 2022-05-05 NOTE — DISCHARGE PLANNING
Case Management/IDT follow up.   Met with pt who just started to eat lunch.   She prefers I speak with her  also.     Summary from IDT:     Projected dc date set for 5/11/2022  Recommendations made for home health for PT/OT/RN  DME:  possible wc/cushion / platform walker R side; Ramp, shower chair, grab bars in shower, commode over toilet, hip kit   plus a ramp.   Choice forms signed  Follow up with Dr Franco/ obtain pcp.     Plan:    Meet with pt and spouse.  Dc home w/ spouse on 5/11/2022

## 2022-05-05 NOTE — THERAPY
"Occupational Therapy  Daily Treatment     Patient Name: Carmita Miller  Age:  72 y.o., Sex:  female  Medical Record #: [id number]  Today's Date: 5/5/2022     Precautions  Precautions: Fall Risk,Toe Touch Weight Bearing Right Lower Extremity,Non Weight Bearing Right Upper Extremity - Platform ok         Subjective    Pt seen at 9:30 am (60 min bathroom transfer practice tx) and 13:00 (UB therex and review bathroom recommendations)    \"we should have done this a long time ago with my past injuries, but we never ended up doing it\" re: home/bathroom modifications for improve accessibility      Objective       05/05/22 0931   Sitting Upper Body Exercises   Comments BUE HEP: provided pt with blue resistance band for R shoulder flex/abduct/extension, L shoulder in all directions and elbow 1 x 10 each   Interdisciplinary Plan of Care Collaboration   Patient Position at End of Therapy Seated;Call Light within Reach;Tray Table within Reach   OT Total Time Spent   OT Individual Total Time Spent (Mins) 90   OT Charge Group   OT Self Care / ADL 5   OT Therapeutic Exercise  1     Edu on bathroom DME recommendations and set-up:  - pt provided home/bathroom measurements and pictures  - 3-in-1 commode over toilet, possibly commode in shower as shower seat  - tub transfer bench if able dependent on whether it fits in shower, remove glass doors and install shower curtains  - grab bars at toilet and shower  - rental hospital bed, permament or rental ramp for front entry  - hip kit    Bathroom transfer practice at platform FWW - CGA for functional mobility in bathroom    - walk-in step in shower transfer with use of 3-in-1 commode as shower chair- Mod A d/t lower seat and significant reliance on grab bar  - sit<>stand from tub transfer bench at tub shower - CGA    Assessment    Continue edu/problem solving on bathroom DME set-up provided. Spouse currently working on installation of ramp - spouse could not find a rental ramp with " recommended dimensions. Will possibly have friend install a permament ramp. Pt receptive to edu re: bathroom modifications including removing glass doors, installing grab bars, and purchasing tub transfer bench. Pt currently requires Min - Mod A for transfers    Strengths: Able to follow instructions,Alert and oriented,Effective communication skills,Good insight into deficits/needs,Independent prior level of function,Making steady progress towards goals,Manages pain appropriately,Motivated for self care and independence,Pleasant and cooperative,Supportive family,Willingly participates in therapeutic activities  Barriers: Decreased endurance,Generalized weakness,Impaired balance,Impaired activity tolerance,Home accessibility,Pain    Plan    Bathroom transfers, general strength/endurance to assist with bathroom transfers, ADL's at platform FWW, standing tolerance    Occupational Therapy Goals (Active)       Problem: Dressing       Dates: Start: 05/04/22         Goal: STG-Within one week, patient will dress UB with supervision       Dates: Start: 05/04/22         Goal Note filed on 05/05/22 0817 by Tuyet Dykes MS,OTR/L       New admit - not yet addressed              Goal: STG-Within one week, patient will dress LB with Min A using DME/AE as needed       Dates: Start: 05/04/22         Goal Note filed on 05/05/22 0817 by Tuyet Dykes MS,OTR/L       New admit - not yet addressed                 Problem: Functional Transfers       Dates: Start: 05/04/22         Goal: STG-Within one week, patient will transfer to step in shower with Mod A using DME/AE as needed       Dates: Start: 05/04/22         Goal Note filed on 05/05/22 0817 by Tuyet Dykes MS,OTR/L       New admit - not yet addressed                 Problem: OT Long Term Goals       Dates: Start: 05/04/22         Goal: LTG-By discharge, patient will complete basic self care tasks with supervision using DME/AE as needed       Dates: Start:  05/04/22            Goal: LTG-By discharge, patient will perform bathroom transfers with CGA using DME/AE as needed       Dates: Start: 05/04/22

## 2022-05-05 NOTE — CARE PLAN
Problem: Mobility  Goal: STG-Within one week, patient will propel wheelchair community x 50 feet with L LE/L UE and SBA  Outcome: Not Met  Note: Pt just evaluated  Goal: STG-Within one week, patient will ambulate household distance x 20 feet with PFWW and min A  Outcome: Not Met  Note: Pt just evaluated     Problem: Mobility Transfers  Goal: STG-Within one week, patient will transfer bed to chair with PFWW and SBA  Outcome: Not Met  Note: Pt just evaluated

## 2022-05-05 NOTE — THERAPY
Physical Therapy   Daily Treatment     Patient Name: Carmita Miller  Age:  72 y.o., Sex:  female  Medical Record #: [id number]  Today's Date: 5/5/2022     Precautions  Precautions: Fall Risk,Toe Touch Weight Bearing Right Lower Extremity,Non Weight Bearing Right Upper Extremity    Subjective    Pt found in room, ready and agreeable to therapy.  Pt was seen from [id number] and [id number]     Objective       05/05/22 0831   Gait Functional Level of Assist    Gait Level Of Assist Contact Guard Assist  (/SBA)   Assistive Device Platform Walker   Distance (Feet)   (20'x1 in room, 125'x1, 100' x1 in gym)   Deviation Antalgic;Step To;Decreased Base Of Support;Bradykinetic;Decreased Heel Strike   Wheelchair Functional Level of Assist   Wheelchair Assist Stand by Assist   Distance Wheelchair (Feet or Distance) 20  (cues to use L LE to assist propulsion)   Wheelchair Description Extra time;Assistance with steering  (in room mobility)   Transfer Functional Level of Assist   Bed, Chair, Wheelchair Transfer Contact Guard Assist  (CGA w/c<> platform walker)   Bed Chair Wheelchair Transfer Description Increased time;Requires lift;Assist with one limb  (sit>sup Swati R LE onto mat table, sup>sit SBA with inc time)   Supine Lower Body Exercise   Bridges Two Legged;3 sets of 10  (on bolster)   Hip Abduction 3 sets of 10  (SL in supine)   Straight Leg Raises Front;1 set of 10  (AAROM, limited by fatigue)   Short Arc Quad 3 sets of 10;Right    Heel Slide 3 sets of 10;Right   Ankle Pumps Reciprocal;3 sets of 10   Comments Supine ex's performed on mat, reinforced supine HEP indep, use of leg  to increase AAROM   Bed Mobility    Supine to Sit Standby Assist   Sit to Supine Minimal Assist   Sit to Stand Contact Guard Assist   Interdisciplinary Plan of Care Collaboration   IDT Collaboration with  Physical Therapist;Occupational Therapist   Patient Position at End of Therapy Seated;Call Light within Reach  (hand off to Rec therapy)    Collaboration Comments re: barriers, CLOF   PT Total Time Spent   PT Individual Total Time Spent (Mins) 90   PT Charge Group   PT Gait Training 3   PT Therapeutic Exercise 2   PT Therapeutic Activities 1   [id number]    Pt was seen in AM, seated in bed, attempting to rita pants.  Performed sit<>stand EOB with platform walker with CGA and elevated bed height. Swati to pull pants up over hips.   Gait training in room: edu on TTWBing maintaining toe contact/knee flex to allow step through gait pattern.  Vc's for upright posture and spacing within walker.    [id number]  Pt was seen in gym for gait training: vc's for step through pattern, upright posture and inc gait speed.  Platform walker adjusted to height in standing.  Supine there ex completed.   Requires inc time with transfers d/t pain and difficulty with moving R LE in/out of bed.    Assessment    Pt progressing endurance and tolerance to gait training.  Reinforcing goals for indep at walker level for in home mobility.    Strengths: Able to follow instructions,Effective communication skills,Independent prior level of function,Motivated for self care and independence,Pleasant and cooperative,Supportive family,Willingly participates in therapeutic activities  Barriers: Decreased endurance,Fatigue,Generalized weakness,Home accessibility,Impaired activity tolerance,Impaired balance,Limited mobility,Pain,Pain poorly managed    Plan    LE strengthening: STS, standing ex's/balance, endurance, gait with platform walker, w/c mobility outdoors, transfer training: standard bed?may be d/c'in to hospital bed on main floor    Passport items to be completed:  Get in/out of bed safely, in/out of a vehicle, safely use mobility device, walk or wheel around home/community, navigate up and down stairs, show how to get up/down from the ground, ensure home is accessible, demonstrate HEP, complete caregiver training    Physical Therapy Problems (Active)       Problem: Mobility        Dates: Start: 05/04/22         Goal: STG-Within one week, patient will propel wheelchair community x 50 feet with L LE/L UE and SBA       Dates: Start: 05/04/22         Goal Note filed on 05/05/22 0833 by Hannah Jo DPT       Pt just evaluated              Goal: STG-Within one week, patient will ambulate household distance x 20 feet with PFWW and min A       Dates: Start: 05/04/22         Goal Note filed on 05/05/22 0833 by Hannah Jo DPT       Pt just evaluated                 Problem: Mobility Transfers       Dates: Start: 05/04/22         Goal: STG-Within one week, patient will transfer bed to chair with PFWW and SBA       Dates: Start: 05/04/22         Goal Note filed on 05/05/22 0833 by Hannah Jo DPT       Pt just evaluated                 Problem: PT-Long Term Goals       Dates: Start: 05/04/22         Goal: LTG-By discharge, patient will ambulate x 50 feet with PFWW, mod I        Dates: Start: 05/04/22            Goal: LTG-By discharge, patient will transfer one surface to another with PFWW, mod I        Dates: Start: 05/04/22            Goal: LTG-By discharge, patient will transfer in/out of a car with PFWW and SPV       Dates: Start: 05/04/22

## 2022-05-06 LAB
BASOPHILS # BLD AUTO: 0.6 % (ref 0–1.8)
BASOPHILS # BLD: 0.02 K/UL (ref 0–0.12)
EOSINOPHIL # BLD AUTO: 0.13 K/UL (ref 0–0.51)
EOSINOPHIL NFR BLD: 3.7 % (ref 0–6.9)
ERYTHROCYTE [DISTWIDTH] IN BLOOD BY AUTOMATED COUNT: 50.4 FL (ref 35.9–50)
HCT VFR BLD AUTO: 29.6 % (ref 37–47)
HGB BLD-MCNC: 9.7 G/DL (ref 12–16)
IMM GRANULOCYTES # BLD AUTO: 0.03 K/UL (ref 0–0.11)
IMM GRANULOCYTES NFR BLD AUTO: 0.9 % (ref 0–0.9)
LYMPHOCYTES # BLD AUTO: 1.62 K/UL (ref 1–4.8)
LYMPHOCYTES NFR BLD: 46.2 % (ref 22–41)
MCH RBC QN AUTO: 32.6 PG (ref 27–33)
MCHC RBC AUTO-ENTMCNC: 32.8 G/DL (ref 33.6–35)
MCV RBC AUTO: 99.3 FL (ref 81.4–97.8)
MONOCYTES # BLD AUTO: 0.45 K/UL (ref 0–0.85)
MONOCYTES NFR BLD AUTO: 12.8 % (ref 0–13.4)
NEUTROPHILS # BLD AUTO: 1.26 K/UL (ref 2–7.15)
NEUTROPHILS NFR BLD: 35.8 % (ref 44–72)
NRBC # BLD AUTO: 0 K/UL
NRBC BLD-RTO: 0 /100 WBC
PLATELET # BLD AUTO: 80 K/UL (ref 164–446)
PMV BLD AUTO: 10.4 FL (ref 9–12.9)
RBC # BLD AUTO: 2.98 M/UL (ref 4.2–5.4)
WBC # BLD AUTO: 3.5 K/UL (ref 4.8–10.8)

## 2022-05-06 PROCEDURE — 700102 HCHG RX REV CODE 250 W/ 637 OVERRIDE(OP): Performed by: PHYSICAL MEDICINE & REHABILITATION

## 2022-05-06 PROCEDURE — 36415 COLL VENOUS BLD VENIPUNCTURE: CPT

## 2022-05-06 PROCEDURE — 85025 COMPLETE CBC W/AUTO DIFF WBC: CPT

## 2022-05-06 PROCEDURE — 700111 HCHG RX REV CODE 636 W/ 250 OVERRIDE (IP): Performed by: PHYSICAL MEDICINE & REHABILITATION

## 2022-05-06 PROCEDURE — 97530 THERAPEUTIC ACTIVITIES: CPT

## 2022-05-06 PROCEDURE — 700101 HCHG RX REV CODE 250: Performed by: PHYSICAL MEDICINE & REHABILITATION

## 2022-05-06 PROCEDURE — 770010 HCHG ROOM/CARE - REHAB SEMI PRIVAT*

## 2022-05-06 PROCEDURE — 97535 SELF CARE MNGMENT TRAINING: CPT

## 2022-05-06 PROCEDURE — 97110 THERAPEUTIC EXERCISES: CPT

## 2022-05-06 PROCEDURE — 97116 GAIT TRAINING THERAPY: CPT

## 2022-05-06 PROCEDURE — 99232 SBSQ HOSP IP/OBS MODERATE 35: CPT | Performed by: PHYSICAL MEDICINE & REHABILITATION

## 2022-05-06 PROCEDURE — 97140 MANUAL THERAPY 1/> REGIONS: CPT

## 2022-05-06 PROCEDURE — A9270 NON-COVERED ITEM OR SERVICE: HCPCS | Performed by: PHYSICAL MEDICINE & REHABILITATION

## 2022-05-06 RX ORDER — POLYETHYLENE GLYCOL 3350 17 G/17G
1 POWDER, FOR SOLUTION ORAL DAILY
Status: DISCONTINUED | OUTPATIENT
Start: 2022-05-07 | End: 2022-05-11 | Stop reason: HOSPADM

## 2022-05-06 RX ADMIN — OXYCODONE 5 MG: 5 TABLET ORAL at 01:11

## 2022-05-06 RX ADMIN — ACETAMINOPHEN 1000 MG: 500 TABLET, FILM COATED ORAL at 20:05

## 2022-05-06 RX ADMIN — LIDOCAINE 1 PATCH: 50 PATCH TOPICAL at 08:11

## 2022-05-06 RX ADMIN — MULTIPLE VITAMINS W/ MINERALS TAB 1 TABLET: TAB at 11:16

## 2022-05-06 RX ADMIN — ENOXAPARIN SODIUM 40 MG: 40 INJECTION SUBCUTANEOUS at 08:11

## 2022-05-06 RX ADMIN — METHOCARBAMOL TABLETS 750 MG: 750 TABLET, COATED ORAL at 05:24

## 2022-05-06 RX ADMIN — OXYCODONE 5 MG: 5 TABLET ORAL at 08:11

## 2022-05-06 RX ADMIN — OXYCODONE 5 MG: 5 TABLET ORAL at 13:57

## 2022-05-06 RX ADMIN — ACETAMINOPHEN 1000 MG: 500 TABLET, FILM COATED ORAL at 08:11

## 2022-05-06 RX ADMIN — ACETAMINOPHEN 1000 MG: 500 TABLET, FILM COATED ORAL at 14:46

## 2022-05-06 RX ADMIN — POLYETHYLENE GLYCOL 3350 1 PACKET: 17 POWDER, FOR SOLUTION ORAL at 08:11

## 2022-05-06 RX ADMIN — NAPROXEN 250 MG: 500 TABLET ORAL at 08:10

## 2022-05-06 RX ADMIN — OMEPRAZOLE 20 MG: 20 CAPSULE, DELAYED RELEASE ORAL at 08:11

## 2022-05-06 ASSESSMENT — ACTIVITIES OF DAILY LIVING (ADL)
BED_CHAIR_WHEELCHAIR_TRANSFER_DESCRIPTION: INCREASED TIME;ADAPTIVE EQUIPMENT
BED_CHAIR_WHEELCHAIR_TRANSFER_DESCRIPTION: INCREASED TIME;INITIAL PREPARATION FOR TASK;SET-UP OF EQUIPMENT;SUPERVISION FOR SAFETY;VERBAL CUEING
TOILET_TRANSFER_DESCRIPTION: GRAB BAR;INCREASED TIME
TOILET_TRANSFER_DESCRIPTION: GRAB BAR;INCREASED TIME;INITIAL PREPARATION FOR TASK;REQUIRES LIFT;SET-UP OF EQUIPMENT;SUPERVISION FOR SAFETY;VERBAL CUEING

## 2022-05-06 ASSESSMENT — GAIT ASSESSMENTS
DEVIATION: STEP TO;DECREASED BASE OF SUPPORT;BRADYKINETIC
ASSISTIVE DEVICE: PLATFORM WALKER
GAIT LEVEL OF ASSIST: STANDBY ASSIST

## 2022-05-06 NOTE — THERAPY
Recreational Therapy  Daily Treatment     Patient Name: Carmita Miller  AGE:  72 y.o., SEX:  female  Medical Record #: [id number]  Today's Date: 5/6/2022       Subjective    Pt sharing that she wanted to make sure that she was meeting her goals in recreation therapy.      Objective       05/06/22 1501   Functional Ability Status - Cognitive   Attention Span Remains on Task   Comprehension Follows Three Step Commands   Judgment Able to Make Independent Decisions   Functional Ability Status - Emotional    Affect Appropriate;Bright   Mood Appropriate   Behavior Appropriate   Skilled Intervention    Skilled Intervention Relaxation / Coping Skills   Skilled Intervention Comments Seated connect four   Interdisciplinary Plan of Care Collaboration   Patient Position at End of Therapy In Bed;Tray Table within Reach;Call Light within Reach   Strengths & Barriers   Strengths Able to follow instructions;Alert and oriented;Manages pain appropriately;Motivated for self care and independence;Pleasant and cooperative;Supportive family;Willingly participates in therapeutic activities   Barriers Decreased endurance;Impaired activity tolerance   Treatment Time   Total Time Spent (mins) 30   Procedural Tracking   Procedural Tracking Community Re-Integration;Community Skills Development;Leisure Skills Awareness;Leisure Skills Development       Assessment    Pt sharing on post dc leisure planning for travel. Conversation on how she was traveling in the past and katia like to return to this lifestyle in the future.     Strengths: (P) Able to follow instructions,Alert and oriented,Manages pain appropriately,Motivated for self care and independence,Pleasant and cooperative,Supportive family,Willingly participates in therapeutic activities  Barriers: (P) Decreased endurance,Impaired activity tolerance    Plan    Standing endurance in weight shift.

## 2022-05-06 NOTE — CARE PLAN
The patient is Watcher - Medium risk of patient condition declining or worsening    Shift Goals  Clinical Goals: Safety, Pain control      Problem: Pain - Standard  Goal: Alleviation of pain or a reduction in pain to the patient’s comfort goal  Note: Patient having 6/10 right hip and foot pain, PRN oxy and scheduled pain meds have been administered, ice packs were provided, and assisted patient to a comfortable position when in bed, will continue to monitor.      Problem: Skin Integrity  Goal: Patient's skin integrity will be maintained or improve  Note: Patients right hip incision has been redressed, staples present, photos uploaded, will continue to monitor.

## 2022-05-06 NOTE — THERAPY
Recreational Therapy   Initial Evaluation     Patient Name: Carmita Miller  Age:  72 y.o., Sex:  female  Medical Record #: [id number]  Today's Date: 5/6/2022     Subjective    Pt sharing that she would like to work toward being more independent.     Objective       05/05/22 1501   Leisure History   Leisure Interests Pets;Travel   Leisure Comments Dog   Prior Living Arrangements   Lives with - Patient's Self Care Capacity Spouse;Other (Comments)  (Brother lives with her. Brother has an aquired brain injury)   Steps Into Home 2   Steps In Home 13   Ambulation Independent   Assistive Devices Used None   Driving / Transportation Driving Independent   Functional Ability Status - Physical   Endurance Low   Right  Strong   Left  Strong   Right Arm Strong   Left Arm Strong   Right Leg Weak  (pain)   Left Leg Strong   Upper Extremity Gross Motor Uses Both Arms / Hands   Lower Extremity Gross Motor Uses Both Legs  (RLE pain)   Fine Motor Manipulates Small Objects   Functional Ability Status - Cognitive   Attention Span Remains on Task   Comprehension Follows Three Step Commands   Judgment Able to Make Independent Decisions   Functional Ability Status - Emotional    Affect Appropriate;Bright   Mood Appropriate   Behavior Appropriate   Leisure Competence Measure   Leisure Awareness Independent   Leisure Attitude Independent   Leisure Skills Minimal Assist   Cultural / Social Behaviors Independent   Interpersonal Skills Independent   Community Integration Skills Minimal Assist   Social Contact Independent   Community Participation Minimal Assist   Clinical Impression   Clinical Impression Impaired Fine Motor Leisure Functioning;Impaired Gross Motor Leisure Functioning;Impaired Endurance;Impaired Community Skills;Impaired Relaxation and Coping Skills;Impaired Leisure Skills   Current Discharge Plan   Current Discharge Plan Return to Prior Living Situation   Benefit    Benefit Patient would Benefit from Inpatient Recreational  Therapy to Maximize Independent Leisure Functioning    Interdisciplinary Plan of Care Collaboration   Patient Position at End of Therapy Seated;Call Light within Reach;Tray Table within Reach   Strengths & Barriers   Strengths Able to follow instructions;Alert and oriented;Motivated for self care and independence;Pleasant and cooperative;Supportive family;Willingly participates in therapeutic activities   Barriers Decreased endurance   Treatment Time   Total Time Spent (mins) 30   Procedural Tracking   Procedural Tracking Community Re-Integration;Community Skills Development;Leisure Skills Awareness;Leisure Skills Development;Gross Motor Functional Leisure Skills       Assessment  Patient is 72 y.o. female with a diagnosis of  Fracture of femoral neck, right, closed .  Additional factors influencing patient status / progress (ie: cognitive factors, co-morbidities, social support, etc): past medical history of bilateral knee arthritis; now admitted for acute inpatient rehabilitation with severe functional debility secondary to multiple orthopedic fractures. Per documentation, patient presented to [facility] on 4/29/2022  5:21 PM for pain after fall on her right side.        Plan  Recommend Recreational Therapy 30 minutes per day 2-3 days per week for 1 weeks for the following treatments:  Community Re-Integration, Community Skills Development, Leisure Skills Awareness, Leisure Skills Development and Gross Motor Functional Leisure Skills    Passport items to be completed:  Verbalize two positive leisure activities, discuss returning to work, hobbies, community groups or volunteer activities, explore community resources     Goals:  Long term and short term goals have been discussed with patient and they are in agreement.    Recreation Therapy Problems (Active)       Problem: Recreation Therapy       Dates: Start: 05/06/22         Goal: STG-Within one week, patient will demonstrate leisure problem solving by sharing  on two positive lesiure activities they would like to participate in either in session or during their free time and any perceived barriers to their participation.       Dates: Start: 05/06/22            Goal: STG-Within one week, patient will demonstrate a standing tolerance while dual tasking or 2 minutes x3 CGA and no LOB.        Dates: Start: 05/06/22            Goal: LTG-By discharge, patient will demonstrate leisure problem solving by sharing on two positive lesiure activities they would like to participate in post dc and any perceived barriers to their participation.       Dates: Start: 05/06/22            Goal: LTG-By discharge, patient will demonstrate a standing tolerance while dual tasking or 3 minutes x3 CGA and no LOB.        Dates: Start: 05/06/22

## 2022-05-06 NOTE — DISCHARGE PLANNING
Cm  I met with pt and spoke w/ her , Anthony via phone.   He and  neighbors are building a ramp.   He has ordered a shower seat.   Pt prefers to go ahead and use current walker w/ platform as she does not qualify for a bariatric fww.  DME provider is [employer] [facility].   Pt wants a hospital bed @ home and understands insurance will not cover.   A wheelchair w/ cushion will be ordered for community use.    Referral made to [facility].   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        Plan:  Dc home on 5/11/2022

## 2022-05-06 NOTE — CARE PLAN
The patient is Stable - Low risk of patient condition declining or worsening    Shift Goals  Clinical Goals: Safety, Pain control    Progress made toward(s) clinical / shift goals:      Problem: Pain - Standard  Goal: Alleviation of pain or a reduction in pain to the patient’s comfort goal  Outcome: Progressing  Note: Pain meds given as scheduled and as needed per MAR for c/o right hip pain with adequate relief. Pt sleeps good. Will continue to monitor.     Problem: Fall Risk - Rehab  Goal: Patient will remain free from falls  Outcome: Progressing  Note: Em [place] Fall risk Assessment Score: 9    Low fall risk interventions   - Call light within reach   - Yellow  socks   - Belongings within reach   - Bed in the lowest position     Pt calls appropriately and waits for assistance before all transfers.             Patient is not progressing towards the following goals:

## 2022-05-06 NOTE — THERAPY
Occupational Therapy  Daily Treatment     Patient Name: Carmita Miller  Age:  72 y.o., Sex:  female  Medical Record #: [id number]  Today's Date: 5/6/2022     Precautions  Precautions: Fall Risk,Toe Touch Weight Bearing Right Lower Extremity,Non Weight Bearing Right Upper Extremity         Subjective    Pt seen 2x today: 9:30-10:30 and 1-1:30 pm      Objective       05/06/22 0931   Cognition    Level of Consciousness Alert   Functional Level of Assist   Bathing Minimal Assist   Bathing Description Grab bar;Hand held shower;Tub bench;Assit with back;Assit wtih lower extremities;Assit with perineal;Increased time;Initial preparation for task;Set up for wound protection;Supervision for safety;Verbal cueing;Set-up of equipment  (assist with feet seated on bench and buttock in standing)   Upper Body Dressing Minimal Assist   Upper Body Dressing Description Increased time;Initial preparation for task;Set-up of equipment;Supervision for safety  (assist to turn bra around waist)   Lower Body Dressing Moderate Assist   Lower Body Dressing Description Grab bar;Assist with threading into pant leg;Increased time;Initial preparation for task;Set-up of equipment;Supervision for safety   Toileting Minimal Assist   Toileting Description Assist to pull pants up;Grab bar;Increased time;Initial preparation for task;Supervision for safety;Set-up of equipment   Bed, Chair, Wheelchair Transfer Contact Guard Assist   Bed Chair Wheelchair Transfer Description Increased time;Initial preparation for task;Set-up of equipment;Supervision for safety;Verbal cueing  (stand pivot using bed rail)   Toilet Transfers Minimal Assist   Toilet Transfer Description Grab bar;Increased time;Initial preparation for task;Requires lift;Set-up of equipment;Supervision for safety;Verbal cueing  (min a for STS from toilet)   Tub / Shower Transfers Minimal Assist   Tub Shower Transfer Description Grab bar;Shower bench;Increased time;Initial preparation for  task;Requires lift;Set-up of equipment;Supervision for safety  (min a for STS)   Sitting Upper Body Exercises   Chest Press 2 sets of 10;Bilateral;Weight (See Comments for lbs)   Internal Shoulder Rotation 2 sets of 10;Bilateral;Weight (See Comments for lbs)   Bicep Curls 2 sets of 10;Bilateral;Weight (See Comments for lbs)   Comments 3 lb weight with LUE; no weight RUE    Bed Mobility    Supine to Sit Standby Assist   Scooting Standby Assist   OT Total Time Spent   OT Individual Total Time Spent (Mins) 90   OT Charge Group   OT Self Care / ADL 4   OT Therapy Activity 1   OT Therapeutic Exercise  1       Assessment    Morning session: pt engaged in shower/ADL session this morning. Pt required assist for washing feet while seated and buttock in standing- pt would benefit from long handled sponge. Pt required mod a for LBD- would benefit from using AE next session to thread brief over B feet. Pt required min a for STS's from toilet and tub bench and cues for moving to edge of surfaces prior to standing.     Afternoon session: Pt engaged in BUE strengthening using 3 lb weight with LUE and no weight RUE; Discussed pt's goals prior to d/c. Pt wants to get able to cook a little and do laundry, open the doorway in standing to let her dog in/out, get up/down her stairs and get up from the floor. Discussed safest way for pt to get up the stairs now is on her bottom- scooting up the steps due to WB precautions on RLE and RUE.     Strengths: Able to follow instructions,Alert and oriented,Effective communication skills,Good insight into deficits/needs,Independent prior level of function,Making steady progress towards goals,Manages pain appropriately,Motivated for self care and independence,Pleasant and cooperative,Supportive family,Willingly participates in therapeutic activities  Barriers: Decreased endurance,Generalized weakness,Impaired balance,Impaired activity tolerance,Home accessibility,Pain    Plan    *cooking, laundry,  simulating opening a doorway while in standing to let the dog out/in, floor recovery*   Bathroom transfers, general strength/endurance to assist with bathroom transfers, ADL's at platform FWW, standing tolerance    Occupational Therapy Goals (Active)       Problem: Dressing       Dates: Start: 05/04/22         Goal: STG-Within one week, patient will dress UB with supervision       Dates: Start: 05/04/22         Goal Note filed on 05/05/22 0817 by Tuyet Dykes MS,OTR/L       New admit - not yet addressed              Goal: STG-Within one week, patient will dress LB with Min A using DME/AE as needed       Dates: Start: 05/04/22         Goal Note filed on 05/05/22 0817 by Tuyet Dykes MS,OTR/L       New admit - not yet addressed                 Problem: Functional Transfers       Dates: Start: 05/04/22         Goal: STG-Within one week, patient will transfer to step in shower with Mod A using DME/AE as needed       Dates: Start: 05/04/22         Goal Note filed on 05/05/22 0817 by Tuyet Dykes MS,OTR/L       New admit - not yet addressed                 Problem: OT Long Term Goals       Dates: Start: 05/04/22         Goal: LTG-By discharge, patient will complete basic self care tasks with supervision using DME/AE as needed       Dates: Start: 05/04/22            Goal: LTG-By discharge, patient will perform bathroom transfers with CGA using DME/AE as needed       Dates: Start: 05/04/22

## 2022-05-06 NOTE — THERAPY
Physical Therapy   Daily Treatment     Patient Name: Carmita Miller  Age:  72 y.o., Sex:  female  Medical Record #: [id number]  Today's Date: 5/6/2022     Precautions  Precautions: Fall Risk,Toe Touch Weight Bearing Right Lower Extremity,Non Weight Bearing Right Upper Extremity    Subjective    Pt was ready and up for therapy.   Pt was seen from 9130-1068 and [id number]     Objective       05/06/22 0831   Gait Functional Level of Assist    Gait Level Of Assist Standby Assist   Assistive Device Platform Walker   Distance (Feet)   (110'emiliana morning, 120' x1in afternoon, 25'x1 in room)   Deviation Step To;Decreased Base Of Support;Bradykinetic   Transfer Functional Level of Assist   Bed, Chair, Wheelchair Transfer Modified Independent  (pt seen at 1401 for Mary bed transfers)   Bed Chair Wheelchair Transfer Description Increased time;Adaptive equipment  (sit<>sup Mary with leg  and bed rail)   Toilet Transfers Modified Independent  (pt seen at 1401 for Mary transfers)   Toilet Transfer Description Grab bar;Increased time   Sitting Lower Body Exercises   Sit to Stand   (1 x 5 w/c<>platform walker CGA/SBA)   Nustep Resistance Level 2;Time (See Comments)  (L UE and BLE's, R LE TTWBing av 40SPM)   Other Exercises Soft tissue mobilization & massage for pain, edema and ROM improvement: foot, lateral>medial ankle & distal calf to proximal calf, AAROM R ankle DF & EV stretching.  Figure 8 ace wrap R ankle for edema/compression/stability   Bed Mobility    Supine to Sit Modified Independent   Sit to Supine Modified Independent   Sit to Stand Standby Assist   Neuro-Muscular Treatments   Neuro-Muscular Treatments Verbal Cuing;Tactile Cuing;Sequencing   Comments Demo & vc's for ant weight shifting & momentum for STS.   Interdisciplinary Plan of Care Collaboration   Patient Position at End of Therapy In Bed;Call Light within Reach;Tray Table within Reach;Phone within Reach   PT Total Time Spent   PT Individual Total Time Spent  (Mins) 90   PT Charge Group   PT Gait Training 2   PT Therapeutic Exercise 2   PT Therapeutic Activities 1   PT Manual Therapy 1     [id number]   STS training w/c<>platform walker x 5 reps CGA initially, then SPV by end  Gait training vc's for sequence and step through pattern    [id number]  Transfer training in room, progressed to Mary with platform walker.    Soft tissue mobilization  Nu Step for CV endurance/AAROM    Assessment    Pt progressed to Mary in room with platform walker.  Cont to reinforced ROM R ankle & hip for inc strength maintaining mobility.    Strengths: Able to follow instructions,Effective communication skills,Independent prior level of function,Motivated for self care and independence,Pleasant and cooperative,Supportive family,Willingly participates in therapeutic activities  Barriers: Decreased endurance,Fatigue,Generalized weakness,Home accessibility,Impaired activity tolerance,Impaired balance,Limited mobility,Pain,Pain poorly managed    Plan  LE strengthening: STS, standing ex's/balance, endurance, gait with platform walker, w/c mobility outdoors, transfer training: standard bed?may be d/c'in to hospital bed on main floor     Passport items to be completed:  Get in/out of bed safely, in/out of a vehicle, safely use mobility device, walk or wheel around home/community, navigate up and down stairs, show how to get up/down from the ground, ensure home is accessible, demonstrate HEP, complete caregiver training    Physical Therapy Problems (Active)       Problem: Mobility       Dates: Start: 05/04/22         Goal: STG-Within one week, patient will propel wheelchair community x 50 feet with L LE/L UE and SBA       Dates: Start: 05/04/22         Goal Note filed on 05/05/22 0833 by Hannah Jo DPT       Pt just evaluated              Goal: STG-Within one week, patient will ambulate household distance x 20 feet with PFWW and min A       Dates: Start: 05/04/22         Goal Note filed on 05/05/22  0833 by Hannah Jo DPT       Pt just evaluated                 Problem: Mobility Transfers       Dates: Start: 05/04/22         Goal: STG-Within one week, patient will transfer bed to chair with PFWW and SBA       Dates: Start: 05/04/22         Goal Note filed on 05/05/22 0833 by Hannah Jo DPT       Pt just evaluated                 Problem: PT-Long Term Goals       Dates: Start: 05/04/22         Goal: LTG-By discharge, patient will ambulate x 50 feet with PFWW, mod I        Dates: Start: 05/04/22            Goal: LTG-By discharge, patient will transfer one surface to another with PFWW, mod I        Dates: Start: 05/04/22            Goal: LTG-By discharge, patient will transfer in/out of a car with PFWW and SPV       Dates: Start: 05/04/22

## 2022-05-06 NOTE — PROGRESS NOTES
"REHABILITATION PROGRESS NOTE    Date of Admission: 5/3/2022    [facility] Code / Diagnosis to Support: 0008.4 - Orthopaedic Disorders: Status Post Major Multiple Fractures  Etiologic Diagnosis: Fracture of femoral neck, right, closed (HCC)    SUBJECTIVE  Patient seen in room working with therapy  GI: last BM 5/5, continent  : continent  Psych: sleeping well  MSK: pain improving - not a barrier to therapies, wrapping ankle for comfort    I have performed a physical exam and reviewed and updated history and plan today (5/6/2022).       MEDICATIONS:  Current Facility-Administered Medications   Medication Dose   • enoxaparin (Lovenox) inj 40 mg  40 mg   • methocarbamol (ROBAXIN) tablet 750 mg  750 mg   • Respiratory Therapy Consult     • Pharmacy Consult Request ...Pain Management Review 1 Each  1 Each   • omeprazole (PRILOSEC) capsule 20 mg  20 mg   • acetaminophen (TYLENOL) tablet 1,000 mg  1,000 mg    Followed by   • [START ON 5/8/2022] acetaminophen (TYLENOL) tablet 1,000 mg  1,000 mg   • lidocaine (LIDODERM) 5 % 1 Patch  1 Patch   • artificial tears ophthalmic solution 1 Drop  1 Drop   • benzocaine-menthol (Cepacol) lozenge 1 Lozenge  1 Lozenge   • mag hydrox-al hydrox-simeth (MAALOX PLUS ES or MYLANTA DS) suspension 20 mL  20 mL   • ondansetron (ZOFRAN ODT) dispertab 4 mg  4 mg    Or   • ondansetron (ZOFRAN) syringe/vial injection 4 mg  4 mg   • sodium chloride (OCEAN) 0.65 % nasal spray 2 Spray  2 Spray   • therapeutic multivitamin-minerals (THERAGRAN-M) tablet 1 Tablet  1 Tablet   • melatonin tablet 3 mg  3 mg   • magnesium hydroxide (MILK OF MAGNESIA) suspension 30 mL  30 mL   • oxyCODONE immediate-release (ROXICODONE) tablet 2.5-5 mg  2.5-5 mg   • naproxen (NAPROSYN) tablet 250 mg  250 mg   • polyethylene glycol/lytes (MIRALAX) PACKET 1 Packet  1 Packet       PHYSICAL EXAM:     VITAL SIGNS:   height is 1.676 m (5' 6\") and weight is 91.7 kg (202 lb 1.6 oz). Her oral temperature is 36.6 °C (97.8 °F). Her blood " pressure is 108/68 and her pulse is 51 (abnormal). Her respiration is 18 and oxygen saturation is 99%.     General: well-groomed sitting up in no acute distress, no visitors present  Eyes: no scleral icterus or conjunctival injection  Ears, nose, mouth and throat: moist oral mucosa  Cardiovascular: good peripheral perfusion, no pallor  Respiratory: breathing comfortably without use of accessory muscles  Gastrointestinal: nondistended  Genitourinary: no indwelling baker  Neurologic:  Mental status:  A&Ox4 (person, place, date, situation) answers questions appropriately follows commands  Speech: fluent, no aphasia or dysarthria    Tone: no spasticity noted  Psychiatric: appropriate affect  Hematologic/lymphatic/immunologic: no IV access    LABS:  Recent Labs     05/04/22  0554   SODIUM 137   POTASSIUM 4.7   CHLORIDE 102   CO2 25   GLUCOSE 97   BUN 16   CREATININE 0.54   CALCIUM 9.1     Recent Labs     05/04/22  0554 05/06/22  0631   WBC 4.3* 3.5*   RBC 2.99* 2.98*   HEMOGLOBIN 9.5* 9.7*   HEMATOCRIT 29.0* 29.6*   MCV 97.0 99.3*   MCH 31.8 32.6   MCHC 32.8* 32.8*   RDW 49.8 50.4*   PLATELETCT 76* 80*   MPV 10.3 10.4           PRIMARY REHAB DIAGNOSIS:    This patient is a 72 y.o. female admitted for acute inpatient rehabilitation with Fracture of femoral neck, right, closed (HCC).    IMPAIRMENTS:   ADLs/IADLs  Mobility    SECONDARY DIAGNOSIS/MEDICAL CO-MORBIDITIES AFFECTING FUNCTION:  -pancytopenia  -Dizziness with standing  -Postoperative anemia    RELEVANT CHANGES SINCE PREADMISSION EVALUATION:    Status unchanged    The patient's rehabilitation potential is Good  The patient's medical prognosis is good    PLAN:   Discussion and Recommendations:   1. The patient requires an acute inpatient rehabilitation program with a coordinated program of care at an intensity and frequency not available at a lower level of care. This recommendation is substantiated by the patient's medical physicians who recommend that the  patient's intervention and assessment of medical issues needs to be done at an acute level of care for patient's safety and maximum outcome.   2. A coordinated program of care will be supplied by an interdisciplinary team of physical therapy, occupational therapy, rehab physician, rehab nursing, and, if needed, speech therapy and rehab psychology. Rehab team presents a patient-specific rehabilitation and education program concentrating on prevention of future problems related to accessibility, mobility, skin, bowel, bladder, sexuality, and psychosocial and medical/surgical problems.   3. Need for Rehabilitation Physician: The rehab physician will be evaluating the patient on a multi-weekly basis to help coordinate the program of care. The rehab physician communicates between medical physicians, therapists, and nurses to maximize the patient's potential outcome. Specific areas in which the rehab physician will be providing daily assessment include the following:   A. Assessing the patient's heart rate and blood pressure response (vitals monitoring) to activity and making adjustments in medications or conservative measures as needed.   B. The rehab physician will be assessing the frequency at which the program can be increased to allow the patient to reach optimal functional outcome.   C. The rehab physician will also provide assessments in daily skin care, especially in light of patient's impairments in mobility.   D. The rehab physician will provide special expertise in understanding how to work with functional impairment and recommend appropriate interventions, compensatory techniques, and education that will facilitate the patient's outcome.   4. Rehab R.N.   The rehab RN will be working with patient to carry over in room mobility and activities of daily living when the patient is not in 3 hours of skilled therapy. Rehab nursing will be working in conjunction with rehab physician to address all the medical issues  above and continue to assess laboratory work and discuss abnormalities with the treating physicians, assess vitals, and response to activity, and discuss and report abnormalities with the rehab physician. Rehab RN will also continue daily skin care, supervise bladder/bowel program, instruct in medication administration, and ensure patient safety.   5. Rehab Therapy: Therapies to treat at intensity and frequency of (may change after completion of evaluation by all therapeutic disciplines):       PT:  Physical therapy to address mobility, transfer, gait training and evaluation for adaptive equipment needs 1-1.5 hour/day at least 5 days/week for the duration of the ELOS (see below)       OT:  Occupational therapy to address ADLs, self-care, home management training, functional mobility/transfers and assistive device evaluation, and community re-integration 1 -1.5 hour/day at least 5 days/week for the duration of the ELOS (see below).     Medical management / Rehabilitation Issues/ Adverse Potential as part of rehabilitation plan     REHABILITATION ISSUES/ADVERSE POTENTIAL and MEDICAL CO-MORBIDITIES/ADVERSE POTENTIAL AFFECTING FUNCTION:    ##MSK  #Impaired ADLs and mobility  Patient is admitted to [facility] for comprehensive rehabilitation therapy as described.   Rehabilitation nursing monitors bowel and bladder control, educates on medication administration, co-morbidities and monitors patient safety.  Anticipated discharge date: 5/11/2022  Anticipated discharge destination: home with spouse  Prognosis: good  Equipment: TBD  Postdischarge therapies: TBD  Followup: orthopedic surgery (Dr. Cassius Zuniga MD)   Code: full resuscitation    #Posttraumatic pain, acute, improving  #Postsurgical pain, acute, improving  Continue tylenol 1000mg TID, lidocaine patch 5% one patch daily, robaxin 750mg TID PRN spasms, oxycodone 2.5-5mg Q4hr PRN pain, naproxen 250mg BID  Encourage ice to affected  locations    #Acute impacted transcervical right femoral neck fracture s/p Open treatment with internal fixation of right femoral neck fracture on 4/29/2022 by Dr. Cassius Zuniga MD  #Acute right distal radial metaphyseal fracture s/p Closed treatment without manipulation  #old right healed pubic rami fractures with bone remodeling  toe-touch weightbearing of right lower limb and nonweight bearing of right wrist (ok for platform walker)     ##NEURO  Monitor    #RESP  Respiratory therapy: RT performs O2 management prn, breathing retraining, pulmonary hygiene and bronchospasm management prn to optimize participation in therapies.     ##CARDIAC  DVT prophylaxis:  Patient is on lovenox 40mg SC daily for anticoagulation upon transfer. Encourage OOB. Monitor daily for signs and symptoms of DVT including but not limited to swelling and pain to prevent the development of DVT that may interfere with therapies.    ##GI  GI prophylaxis:  On prilosec 20mg daily to prevent gastritis/dyspepsia which may interfere with therapies.  #At risk of opioid induced constipation  Goal of one continent BM daily  Ordered decrease to miralax 17g daily    #At risk for malnutrition  Prealbumin 14.3  Dietician monitors nutrient intake, recommend supplements prn and provide nutrition education to pt/family to promote optimal nutrition for wound healing/recovery.   Orders Placed This Encounter   Procedures   • Diet Order Diet: Regular     Standing Status:   Standing     Number of Occurrences:   1     Order Specific Question:   Diet:     Answer:   Regular [1]   Ordered multivitamin daily    ##/RENAL  #At risk for urinary retention  timed voids Q3-4hr while awake followed by checking postvoid residual to ensure complete emptying. If PVR>200mL, intermittent catheterization    #Mild hyperphosphatemia  Monitor    ##HEME  #Pancytopenia  Vitamin B12 496  iron panel, ferritin, Vitamin D 25 OH wnl  Platelets mildly  improving  Monitor    ##SKIN  Skin/dermal ulcer prophylaxis: Monitor for new skin conditions with q.2 h. turns as required to prevent the development of skin breakdown.     Clement Ugarte, DO  Physical Medicine and Rehabilitation

## 2022-05-07 PROCEDURE — 97530 THERAPEUTIC ACTIVITIES: CPT

## 2022-05-07 PROCEDURE — 97535 SELF CARE MNGMENT TRAINING: CPT

## 2022-05-07 PROCEDURE — 97530 THERAPEUTIC ACTIVITIES: CPT | Mod: CQ

## 2022-05-07 PROCEDURE — A9270 NON-COVERED ITEM OR SERVICE: HCPCS | Performed by: PHYSICAL MEDICINE & REHABILITATION

## 2022-05-07 PROCEDURE — 700102 HCHG RX REV CODE 250 W/ 637 OVERRIDE(OP): Performed by: PHYSICAL MEDICINE & REHABILITATION

## 2022-05-07 PROCEDURE — 97110 THERAPEUTIC EXERCISES: CPT

## 2022-05-07 PROCEDURE — 700101 HCHG RX REV CODE 250: Performed by: PHYSICAL MEDICINE & REHABILITATION

## 2022-05-07 PROCEDURE — 770010 HCHG ROOM/CARE - REHAB SEMI PRIVAT*

## 2022-05-07 PROCEDURE — 97110 THERAPEUTIC EXERCISES: CPT | Mod: CQ

## 2022-05-07 PROCEDURE — 97116 GAIT TRAINING THERAPY: CPT | Mod: CQ

## 2022-05-07 PROCEDURE — 700111 HCHG RX REV CODE 636 W/ 250 OVERRIDE (IP): Performed by: PHYSICAL MEDICINE & REHABILITATION

## 2022-05-07 RX ADMIN — LIDOCAINE 1 PATCH: 50 PATCH TOPICAL at 08:39

## 2022-05-07 RX ADMIN — ACETAMINOPHEN 1000 MG: 500 TABLET, FILM COATED ORAL at 08:40

## 2022-05-07 RX ADMIN — OXYCODONE 5 MG: 5 TABLET ORAL at 05:21

## 2022-05-07 RX ADMIN — MULTIPLE VITAMINS W/ MINERALS TAB 1 TABLET: TAB at 11:02

## 2022-05-07 RX ADMIN — METHOCARBAMOL TABLETS 750 MG: 750 TABLET, COATED ORAL at 16:34

## 2022-05-07 RX ADMIN — ACETAMINOPHEN 1000 MG: 500 TABLET, FILM COATED ORAL at 13:26

## 2022-05-07 RX ADMIN — OXYCODONE 5 MG: 5 TABLET ORAL at 16:34

## 2022-05-07 RX ADMIN — OMEPRAZOLE 20 MG: 20 CAPSULE, DELAYED RELEASE ORAL at 08:40

## 2022-05-07 RX ADMIN — ENOXAPARIN SODIUM 40 MG: 40 INJECTION SUBCUTANEOUS at 08:39

## 2022-05-07 RX ADMIN — NAPROXEN 250 MG: 500 TABLET ORAL at 08:40

## 2022-05-07 ASSESSMENT — ACTIVITIES OF DAILY LIVING (ADL)
BED_CHAIR_WHEELCHAIR_TRANSFER_DESCRIPTION: ADAPTIVE EQUIPMENT;INCREASED TIME;SET-UP OF EQUIPMENT;SUPERVISION FOR SAFETY;VERBAL CUEING

## 2022-05-07 ASSESSMENT — GAIT ASSESSMENTS
DISTANCE (FEET): 160
GAIT LEVEL OF ASSIST: STANDBY ASSIST
ASSISTIVE DEVICE: PLATFORM WALKER
ASSISTIVE DEVICE: PLATFORM WALKER
DEVIATION: STEP TO;DECREASED BASE OF SUPPORT;BRADYKINETIC
DEVIATION: STEP TO;DECREASED BASE OF SUPPORT;BRADYKINETIC
DISTANCE (FEET): 125
GAIT LEVEL OF ASSIST: STANDBY ASSIST

## 2022-05-07 NOTE — THERAPY
"Physical Therapy   Daily Treatment     Patient Name: Carmita Miller  Age:  72 y.o., Sex:  female  Medical Record #: [id number]  Today's Date: 5/7/2022     Precautions  Precautions: Fall Risk,Toe Touch Weight Bearing Right Lower Extremity,Non Weight Bearing Right Upper Extremity    Subjective    Pt was lying in bed upon arrival, agreeable to session.  \"How long will it take for me to be able to drive again\"     Objective       05/07/22 0701   Pain 0 - 10 Group   Location Hip;Foot   Pain Rating Scale (NPRS) 3   Therapist Pain Assessment During Activity   Cognition    Level of Consciousness Alert   Gait Functional Level of Assist    Gait Level Of Assist Standby Assist   Assistive Device Platform Walker   Distance (Feet) 125   # of Times Distance was Traveled 1   Deviation Step To;Decreased Base Of Support;Bradykinetic   Wheelchair Functional Level of Assist   Wheelchair Assist Stand by Assist   Distance Wheelchair (Feet or Distance) 20   Wheelchair Description Extra time;Leg rest management;Supervision for safety   Transfer Functional Level of Assist   Bed, Chair, Wheelchair Transfer Standby Assist   Bed Chair Wheelchair Transfer Description Adaptive equipment;Increased time;Set-up of equipment;Supervision for safety;Verbal cueing  (platform walker)   Sitting Lower Body Exercises   Sitting Lower Body Exercises   (orange resistance band)   Ankle Pumps 1 set of 10;Bilateral   Hip Abduction 1 set of 10;Right    Long Arc Quad 1 set of 10;Bilateral   Marching 1 set of 10   Bed Mobility    Supine to Sit Standby Assist  (leg )   Sit to Stand Standby Assist   Scooting Standby Assist   Interdisciplinary Plan of Care Collaboration   Patient Position at End of Therapy Seated;Call Light within Reach;Tray Table within Reach;Phone within Reach   PT Total Time Spent   PT Individual Total Time Spent (Mins) 60   PT Charge Group   PT Gait Training 1   PT Therapeutic Exercise 1   PT Therapeutic Activities 2   Supervising Physical " Therapist Chelle Flores     Assisted in LB dressing and pt completed UB dressing w/ SBA.  Wrapped R ankle w/ acewrap for stability per pt's request.  Provided w/c brake extension for R side.  Delivered seated LE therex.    Assessment    Pt tolerated session well and having questions for driving and stairs. Therapist advised that pt has to at least wait until all WB precautions are clear for both UE+LE and see how's to current condition by then. Bubble wrapped RLE to check TTWB precaution and pt did poop a few during first 5 steps but was able to maintain precaution later on and during xfer.    Strengths: Able to follow instructions,Effective communication skills,Independent prior level of function,Motivated for self care and independence,Pleasant and cooperative,Supportive family,Willingly participates in therapeutic activities  Barriers: Decreased endurance,Fatigue,Generalized weakness,Home accessibility,Impaired activity tolerance,Impaired balance,Limited mobility,Pain,Pain poorly managed    Plan    LE strengthening: STS, standing ex's/balance, endurance, gait with platform walker, w/c mobility outdoors, transfer training: standard bed?may be d/c'in to hospital bed on main floor     Passport items to be completed:  Get in/out of bed safely, in/out of a vehicle, safely use mobility device, walk or wheel around home/community, navigate up and down stairs, show how to get up/down from the ground, ensure home is accessible, demonstrate HEP, complete caregiver training    Physical Therapy Problems (Active)       Problem: Mobility       Dates: Start: 05/04/22         Goal: STG-Within one week, patient will propel wheelchair community x 50 feet with L LE/L UE and SBA       Dates: Start: 05/04/22         Goal Note filed on 05/05/22 0833 by Hannah Jo DPT       Pt just evaluated              Goal: STG-Within one week, patient will ambulate household distance x 20 feet with PFWW and min A       Dates: Start: 05/04/22          Goal Note filed on 05/05/22 0833 by Hannah Jo DPT       Pt just evaluated                 Problem: Mobility Transfers       Dates: Start: 05/04/22         Goal: STG-Within one week, patient will transfer bed to chair with PFWW and SBA       Dates: Start: 05/04/22         Goal Note filed on 05/05/22 0833 by Hannah Jo DPT       Pt just evaluated                 Problem: PT-Long Term Goals       Dates: Start: 05/04/22         Goal: LTG-By discharge, patient will ambulate x 50 feet with PFWW, mod I        Dates: Start: 05/04/22            Goal: LTG-By discharge, patient will transfer one surface to another with PFWW, mod I        Dates: Start: 05/04/22            Goal: LTG-By discharge, patient will transfer in/out of a car with PFWW and SPV       Dates: Start: 05/04/22

## 2022-05-07 NOTE — THERAPY
Occupational Therapy  Daily Treatment     Patient Name: Carmita Miller  Age:  72 y.o., Sex:  female  Medical Record #: [id number]  Today's Date: 5/7/2022     Precautions  Precautions: Fall Risk,Toe Touch Weight Bearing Right Lower Extremity,Non Weight Bearing Right Upper Extremity         Subjective    Pt seated EOB upon arrival, agreeable to OT session.     Objective       05/07/22 0931   Functional Level of Assist   Grooming Modified Independent   Grooming Description   (Oral care standing at sink with platfrom walker)   Sitting Upper Body Exercises   Internal Shoulder Rotation 3 sets of 10;Left;Weight (See Comments for lbs)  (2 lb weight)   Bicep Curls 3 sets of 10;Left;Weight (See Comments for lbs)  (2 lb weight)   Pronation / Supination 3 sets of 10;Left;Weight (See Comments for lbs)  (2 lb weight)   Wrist Flexion / Extension 3 sets of 10;Weight (See Comments for lbs);Left  (2 lb weight)   Interdisciplinary Plan of Care Collaboration   Patient Position at End of Therapy Seated;Call Light within Reach;Tray Table within Reach;Phone within Reach   OT Total Time Spent   OT Individual Total Time Spent (Mins) 60   OT Charge Group   OT Self Care / ADL 1   OT Therapy Activity 1   OT Therapeutic Exercise  2     Compelted functional mobility task at platform walker level from room to gym and gym to room. Pt required 1 standing RB when going from room-gym after completing oral hygiene.    Problem solved and simulated opening sliding door to let dog out/in house. Recommend standing with platform walker to R of sliding door using strong hand to push on handle/ door frame to ensure safety while moving door. Pt simulated task by pushing on upright mirror on wheels completed with SBA-Supervision overall.    Assessment    Pt tolerated session well today. Pt completed all mobility at platform FWW, and was able to problem solve with therapist how to open sliding door for IADLs upon d/c. Pt demo'd understanding of recommendation to  decrease fall risk and tripping hazard. Pt completed UB threx to the best of her ability.    Strengths: Able to follow instructions,Alert and oriented,Effective communication skills,Good insight into deficits/needs,Independent prior level of function,Making steady progress towards goals,Manages pain appropriately,Motivated for self care and independence,Pleasant and cooperative,Supportive family,Willingly participates in therapeutic activities  Barriers: Decreased endurance,Generalized weakness,Impaired balance,Impaired activity tolerance,Home accessibility,Pain    Plan    *cooking, laundry, floor recovery*   Bathroom transfers, general strength/endurance to assist with bathroom transfers, ADL's at platform FWW, standing tolerance    Passport items to be completed:  Perform bathroom transfers, complete dressing, complete feeding, get ready for the day, prepare a simple meal, participate in household tasks, adapt home for safety needs, demonstrate home exercise program, complete caregiver training     Occupational Therapy Goals (Active)       Problem: Dressing       Dates: Start: 05/04/22         Goal: STG-Within one week, patient will dress UB with supervision       Dates: Start: 05/04/22         Goal Note filed on 05/05/22 0817 by Tuyet Dykes MS,OTR/L       New admit - not yet addressed              Goal: STG-Within one week, patient will dress LB with Min A using DME/AE as needed       Dates: Start: 05/04/22         Goal Note filed on 05/05/22 0817 by Tuyet Dykes MS,OTR/L       New admit - not yet addressed                 Problem: Functional Transfers       Dates: Start: 05/04/22         Goal: STG-Within one week, patient will transfer to step in shower with Mod A using DME/AE as needed       Dates: Start: 05/04/22         Goal Note filed on 05/05/22 0817 by Tuyet Dykes MS,OTR/L       New admit - not yet addressed                 Problem: OT Long Term Goals       Dates: Start: 05/04/22          Goal: LTG-By discharge, patient will complete basic self care tasks with supervision using DME/AE as needed       Dates: Start: 05/04/22            Goal: LTG-By discharge, patient will perform bathroom transfers with CGA using DME/AE as needed       Dates: Start: 05/04/22

## 2022-05-07 NOTE — PROGRESS NOTES
Patients abdomen injection has small sanguineous drainage, gauze and Tegaderm placed over site, pressure applied, and ice pack placed to affected area. Charge nurse notified, will relay to night nurse and reassess.

## 2022-05-07 NOTE — CARE PLAN
The patient is Stable - Low risk of patient condition declining or worsening    Shift Goals  Clinical Goals: Safety, Pain control    Progress made toward(s) clinical / shift goals:      Problem: Fall Risk - Rehab  Goal: Patient will remain free from falls  Outcome: Progressing  Note: Em Salcido Fall risk Assessment Score: 9      Low fall risk interventions   - Call light within reach   - Yellow  socks   - Belongings within reach   - Bed in the lowest position     Pt transfers mod I in the room using a platform walker to go to the bathroom. She calls appropriately when in need of assistance.      Problem: VTE Prevention  Goal: Patient will remain free from venous thromboembolism (VTE)  Outcome: Progressing  Note: Pt receiving lovenox injection daily for DVT prophylaxis. She had bled in the injection site (left lower quadrant)during daytime and dressing and pressure applied to the site. As of now, the gauze with transparent dressing remained clean, dry and intact. Will continue to monitor.       Patient is not progressing towards the following goals:

## 2022-05-07 NOTE — CARE PLAN
The patient is Stable - Low risk of patient condition declining or worsening    Shift Goals  Clinical Goals: safety  Patient Goals: safety    Problem: Pain - Standard  Goal: Alleviation of pain or a reduction in pain to the patient’s comfort goal  Outcome: Progressing Patient able to verbalize pain level and verbalize an acceptable level of pain.      Problem: Fall Risk - Rehab  Goal: Patient will remain free from falls  Outcome: Progressing patient has good safety awareness and is Mod I in the room with platform walker.

## 2022-05-07 NOTE — PROGRESS NOTES
Patient care assumed. Report received from [facility]. Patient is alert and calm, resting in bed. Call light and bedside table within reach. Will continue to monitor.

## 2022-05-07 NOTE — THERAPY
Occupational Therapy  Daily Treatment     Patient Name: Carmita Miller  Age:  72 y.o., Sex:  female  Medical Record #: [id number]  Today's Date: 5/7/2022     Precautions  Precautions: Fall Risk,Toe Touch Weight Bearing Right Lower Extremity,Non Weight Bearing Right Upper Extremity         Subjective    Pt supine in bed upon arrival, agreeable to OT session.     Objective       05/07/22 1331   Interdisciplinary Plan of Care Collaboration   Patient Position at End of Therapy Seated;Call Light within Reach;Tray Table within Reach;Phone within Reach   OT Total Time Spent   OT Individual Total Time Spent (Mins) 30   OT Charge Group   OT Therapeutic Exercise  2     Compelted functional mobility task at platform walker level from room to gym and gym to room.    Completed core exercises seated at edge of mat 3 sets of 10:  - chest press with unweighted ball followed by reach forward to pass to therapist with small forward rocking motion   - Modified sit-up with green therapy ball propped behind pt  - Small twists side to side with 2.2lb ball held with L hand and supported by R    Assessment    Pt tolerated session well with focus on core therex and functional mobility. Pt completed all therex to the best of their abilities without complaints of pain. Pt was noted to have increased fatigue while walking back to room at end of session.     Strengths: Able to follow instructions,Alert and oriented,Effective communication skills,Good insight into deficits/needs,Independent prior level of function,Making steady progress towards goals,Manages pain appropriately,Motivated for self care and independence,Pleasant and cooperative,Supportive family,Willingly participates in therapeutic activities  Barriers: Decreased endurance,Generalized weakness,Impaired balance,Impaired activity tolerance,Home accessibility,Pain    Plan    *cooking, laundry, floor recovery*   Bathroom transfers, general strength/endurance to assist with bathroom  transfers, ADL's at platform FWW, standing tolerance    Passport items to be completed:  Perform bathroom transfers, complete dressing, complete feeding, get ready for the day, prepare a simple meal, participate in household tasks, adapt home for safety needs, demonstrate home exercise program, complete caregiver training     Occupational Therapy Goals (Active)       Problem: Dressing       Dates: Start: 05/04/22         Goal: STG-Within one week, patient will dress UB with supervision       Dates: Start: 05/04/22         Goal Note filed on 05/05/22 0817 by Tuyet Dykes MS,OTR/L       New admit - not yet addressed              Goal: STG-Within one week, patient will dress LB with Min A using DME/AE as needed       Dates: Start: 05/04/22         Goal Note filed on 05/05/22 0817 by Tuyet Dykes MS,OTR/L       New admit - not yet addressed                 Problem: Functional Transfers       Dates: Start: 05/04/22         Goal: STG-Within one week, patient will transfer to step in shower with Mod A using DME/AE as needed       Dates: Start: 05/04/22         Goal Note filed on 05/05/22 0817 by Tuyet Dykes MS,OTR/L       New admit - not yet addressed                 Problem: OT Long Term Goals       Dates: Start: 05/04/22         Goal: LTG-By discharge, patient will complete basic self care tasks with supervision using DME/AE as needed       Dates: Start: 05/04/22            Goal: LTG-By discharge, patient will perform bathroom transfers with CGA using DME/AE as needed       Dates: Start: 05/04/22

## 2022-05-07 NOTE — THERAPY
Physical Therapy   Daily Treatment     Patient Name: Carmita Miller  Age:  72 y.o., Sex:  female  Medical Record #: [id number]  Today's Date: 5/7/2022     Precautions  Precautions: Fall Risk,Toe Touch Weight Bearing Right Lower Extremity,Non Weight Bearing Right Upper Extremity    Subjective    Pt was lying in bed upon arrival, agreeable to session.  Pt requested to perform stairs d/t 2 story house w/ 1HR(LHR at first few steps - platform - RHR).  Pt stated the ramp is getting installed today/tomorrow, there will only be a threshold to get into the house.     Objective     05/07/22 1431   Pain   Intervention Declines   Pain 0 - 10 Group   Pain Rating Scale (NPRS) 0   Cognition    Level of Consciousness Alert   Gait Functional Level of Assist    Gait Level Of Assist Standby Assist   Assistive Device Platform Walker   Distance (Feet) 160   # of Times Distance was Traveled 2   Deviation Step To;Decreased Base Of Support;Bradykinetic   Stairs Functional Level of Assist   Level of Assist with Stairs Unable to Participate   Bed Mobility    Supine to Sit Modified Independent  (leg )   Sit to Supine Modified Independent   Sit to Stand Standby Assist   Scooting Supervised   Interdisciplinary Plan of Care Collaboration   Patient Position at End of Therapy In Bed;Call Light within Reach;Tray Table within Reach;Phone within Reach   PT Total Time Spent   PT Individual Total Time Spent (Mins) 30   PT Charge Group   PT Gait Training 1   PT Therapeutic Activities 1   Supervising Physical Therapist Chelle Flores      Therapist demonstrated how anterior and posterior approach w/ TTWB RLE + NWB RUE, Advised pt to attempt stairs until WB precaution is clear or until WBAT. Pt stated she understands and it seems too much of an effort and not worth it.    Assessment    Pt continous to be motivated in therapy session, therapist advised not to perform stairs at CLOF. Advised to set up FT prior to d/c, pt will call SO and let primary  therapist know on Monday.    Strengths: Able to follow instructions,Effective communication skills,Independent prior level of function,Motivated for self care and independence,Pleasant and cooperative,Supportive family,Willingly participates in therapeutic activities  Barriers: Decreased endurance,Fatigue,Generalized weakness,Home accessibility,Impaired activity tolerance,Impaired balance,Limited mobility,Pain,Pain poorly managed    Plan    LE strengthening: STS, standing ex's/balance, endurance, gait with platform walker, w/c mobility outdoors, transfer training: standard bed?may be d/c'in to hospital bed on main floor     Passport items to be completed:  Get in/out of bed safely, in/out of a vehicle, safely use mobility device, walk or wheel around home/community, navigate up and down stairs, show how to get up/down from the ground, ensure home is accessible, demonstrate HEP, complete caregiver training    Physical Therapy Problems (Active)       Problem: Mobility       Dates: Start: 05/04/22         Goal: STG-Within one week, patient will propel wheelchair community x 50 feet with L LE/L UE and SBA       Dates: Start: 05/04/22         Goal Note filed on 05/05/22 0833 by Hannah Jo DPT       Pt just evaluated              Goal: STG-Within one week, patient will ambulate household distance x 20 feet with PFWW and min A       Dates: Start: 05/04/22         Goal Note filed on 05/05/22 0833 by Hannah Jo DPT       Pt just evaluated                 Problem: Mobility Transfers       Dates: Start: 05/04/22         Goal: STG-Within one week, patient will transfer bed to chair with PFWW and SBA       Dates: Start: 05/04/22         Goal Note filed on 05/05/22 0833 by Hannah Jo DPT       Pt just evaluated                 Problem: PT-Long Term Goals       Dates: Start: 05/04/22         Goal: LTG-By discharge, patient will ambulate x 50 feet with PFWW, mod I        Dates: Start: 05/04/22            Goal:  LTG-By discharge, patient will transfer one surface to another with PFWW, mod I        Dates: Start: 05/04/22            Goal: LTG-By discharge, patient will transfer in/out of a car with PFWW and SPV       Dates: Start: 05/04/22

## 2022-05-08 PROCEDURE — 700101 HCHG RX REV CODE 250: Performed by: PHYSICAL MEDICINE & REHABILITATION

## 2022-05-08 PROCEDURE — 700102 HCHG RX REV CODE 250 W/ 637 OVERRIDE(OP): Performed by: PHYSICAL MEDICINE & REHABILITATION

## 2022-05-08 PROCEDURE — 97535 SELF CARE MNGMENT TRAINING: CPT

## 2022-05-08 PROCEDURE — 97110 THERAPEUTIC EXERCISES: CPT

## 2022-05-08 PROCEDURE — 97530 THERAPEUTIC ACTIVITIES: CPT

## 2022-05-08 PROCEDURE — 770010 HCHG ROOM/CARE - REHAB SEMI PRIVAT*

## 2022-05-08 PROCEDURE — A9270 NON-COVERED ITEM OR SERVICE: HCPCS | Performed by: PHYSICAL MEDICINE & REHABILITATION

## 2022-05-08 PROCEDURE — 700111 HCHG RX REV CODE 636 W/ 250 OVERRIDE (IP): Performed by: PHYSICAL MEDICINE & REHABILITATION

## 2022-05-08 RX ADMIN — OMEPRAZOLE 20 MG: 20 CAPSULE, DELAYED RELEASE ORAL at 08:43

## 2022-05-08 RX ADMIN — ACETAMINOPHEN 1000 MG: 500 TABLET, FILM COATED ORAL at 08:42

## 2022-05-08 RX ADMIN — LIDOCAINE 1 PATCH: 50 PATCH TOPICAL at 08:46

## 2022-05-08 RX ADMIN — OXYCODONE 5 MG: 5 TABLET ORAL at 12:14

## 2022-05-08 RX ADMIN — NAPROXEN 250 MG: 500 TABLET ORAL at 20:05

## 2022-05-08 RX ADMIN — NAPROXEN 250 MG: 500 TABLET ORAL at 08:43

## 2022-05-08 RX ADMIN — OXYCODONE 5 MG: 5 TABLET ORAL at 00:31

## 2022-05-08 RX ADMIN — MULTIPLE VITAMINS W/ MINERALS TAB 1 TABLET: TAB at 11:22

## 2022-05-08 RX ADMIN — ENOXAPARIN SODIUM 40 MG: 40 INJECTION SUBCUTANEOUS at 08:46

## 2022-05-08 ASSESSMENT — PAIN DESCRIPTION - PAIN TYPE
TYPE: ACUTE PAIN
TYPE: ACUTE PAIN;SURGICAL PAIN
TYPE: ACUTE PAIN

## 2022-05-08 ASSESSMENT — FIBROSIS 4 INDEX: FIB4 SCORE: 5.04

## 2022-05-08 NOTE — PROGRESS NOTES
Patient care assumed. Report received from day ERIC Jordan. Patient is alert and calm, resting in bed. Call light and bedside table within reach. Will continue to monitor.

## 2022-05-08 NOTE — THERAPY
Occupational Therapy  Daily Treatment     Patient Name: Carmita Miller  Age:  72 y.o., Sex:  female  Medical Record #: [id number]  Today's Date: 5/8/2022     Precautions  Precautions: Fall Risk,Toe Touch Weight Bearing Right Lower Extremity,Non Weight Bearing Right Upper Extremity         Subjective    Pt supine in bed upon arrival, pleasant and cooperative, agreeable to therapy. Reports happy with progress made so far     Objective     05/08/22 1301   Functional Level of Assist   Grooming Modified Independent;Standing   Toileting Modified Independent   Toilet Transfers Modified Independent  (with platform FWW)   IADL Treatments   IADL Treatments Kitchen mobility education   Kitchen Mobility Education pt able to retrieve cones throughout kitchen to simulate retrieval of items at low/high shelves and in appliances. Pt completed at platform FWW with supervision, edu provided re: safety, work simplification, energy conservation strategies   Interdisciplinary Plan of Care Collaboration   Patient Position at End of Therapy Seated;Call Light within Reach;Tray Table within Reach   OT Total Time Spent   OT Individual Total Time Spent (Mins) 60   OT Charge Group   OT Self Care / ADL 2   OT Therapy Activity 1   OT Therapeutic Exercise  1     Continued problem solving on bathroom DME set-up, alternative strategies for bathing if glass doors are not yet removed for improve access. Discuss using 3-in-1 commode, tub transfer bench, grab bars.     Bathroom transfer practice at platform FWW - Mod I for functional mobility in bathroom    - problem solving walk in step in shower transfer with platform FWW in shower first, then step in. Pt completed with SBA-CGA but not likely to complete at home d/t TTWB precautions    Functional mobility room<>gym with occasional seated RB - with platform FWW at supervision     Assessment    Continue edu/problem solving on bathroom DME set-up provided. Pt verbalized good understanding of risks with  attempts to step in shower d/t TTWB precautions and can use alternative strategies for the time being. Otherwise pt demo significant improvement with functional mobility at platform FWW with increase independence     Strengths: Able to follow instructions,Alert and oriented,Effective communication skills,Good insight into deficits/needs,Independent prior level of function,Making steady progress towards goals,Manages pain appropriately,Motivated for self care and independence,Pleasant and cooperative,Supportive family,Willingly participates in therapeutic activities  Barriers: Decreased endurance,Generalized weakness,Impaired balance,Impaired activity tolerance,Home accessibility,Pain    Plan    general strength/endurance to assist with bathroom transfers, ADL's at platform FWW, standing balance    Occupational Therapy Goals (Active)       Problem: Dressing       Dates: Start: 05/04/22         Goal: STG-Within one week, patient will dress UB with supervision       Dates: Start: 05/04/22         Goal Note filed on 05/05/22 0817 by Tuyet Dykes MS,OTR/L       New admit - not yet addressed              Goal: STG-Within one week, patient will dress LB with Min A using DME/AE as needed       Dates: Start: 05/04/22         Goal Note filed on 05/05/22 0817 by Tuyet Dykes MS,OTR/L       New admit - not yet addressed                 Problem: Functional Transfers       Dates: Start: 05/04/22         Goal: STG-Within one week, patient will transfer to step in shower with Mod A using DME/AE as needed       Dates: Start: 05/04/22         Goal Note filed on 05/05/22 0817 by Tuyet Dykes MS,OTR/L       New admit - not yet addressed                 Problem: OT Long Term Goals       Dates: Start: 05/04/22         Goal: LTG-By discharge, patient will complete basic self care tasks with supervision using DME/AE as needed       Dates: Start: 05/04/22            Goal: LTG-By discharge, patient will perform bathroom  transfers with CGA using DME/AE as needed       Dates: Start: 05/04/22

## 2022-05-08 NOTE — CARE PLAN
The patient is Stable - Low risk of patient condition declining or worsening    Shift Goals  Clinical Goals: safety  Patient Goals: sleep well    Problem: Mobility  Goal: Patient's capacity to carry out activities will improve  Outcome: Met. Patient modified-independent in the room with platform walker.      Problem: Pain - Standard  Goal: Alleviation of pain or a reduction in pain to the patient’s comfort goal  Outcome: Not Met. Patient taking PRN pain medication for right ankle and arm pain this shift. Pt educated on pain rating scale, medication, and side effects. Pt verbalizes understanding, will continue to assess.

## 2022-05-09 LAB
ANION GAP SERPL CALC-SCNC: 8 MMOL/L (ref 7–16)
BUN SERPL-MCNC: 16 MG/DL (ref 8–22)
CALCIUM SERPL-MCNC: 9.4 MG/DL (ref 8.5–10.5)
CHLORIDE SERPL-SCNC: 108 MMOL/L (ref 96–112)
CO2 SERPL-SCNC: 24 MMOL/L (ref 20–33)
CREAT SERPL-MCNC: 0.64 MG/DL (ref 0.5–1.4)
ERYTHROCYTE [DISTWIDTH] IN BLOOD BY AUTOMATED COUNT: 51.2 FL (ref 35.9–50)
GFR SERPLBLD CREATININE-BSD FMLA CKD-EPI: 95 ML/MIN/1.73 M 2
GLUCOSE SERPL-MCNC: 89 MG/DL (ref 65–99)
HCT VFR BLD AUTO: 28.6 % (ref 37–47)
HGB BLD-MCNC: 9.3 G/DL (ref 12–16)
MCH RBC QN AUTO: 31.8 PG (ref 27–33)
MCHC RBC AUTO-ENTMCNC: 32.5 G/DL (ref 33.6–35)
MCV RBC AUTO: 97.9 FL (ref 81.4–97.8)
PHOSPHATE SERPL-MCNC: 4.9 MG/DL (ref 2.5–4.5)
PLATELET # BLD AUTO: 79 K/UL (ref 164–446)
PMV BLD AUTO: 9.9 FL (ref 9–12.9)
POTASSIUM SERPL-SCNC: 4.9 MMOL/L (ref 3.6–5.5)
RBC # BLD AUTO: 2.92 M/UL (ref 4.2–5.4)
SODIUM SERPL-SCNC: 140 MMOL/L (ref 135–145)
WBC # BLD AUTO: 3.3 K/UL (ref 4.8–10.8)

## 2022-05-09 PROCEDURE — 97116 GAIT TRAINING THERAPY: CPT

## 2022-05-09 PROCEDURE — 97110 THERAPEUTIC EXERCISES: CPT

## 2022-05-09 PROCEDURE — 84100 ASSAY OF PHOSPHORUS: CPT

## 2022-05-09 PROCEDURE — 770010 HCHG ROOM/CARE - REHAB SEMI PRIVAT*

## 2022-05-09 PROCEDURE — 99232 SBSQ HOSP IP/OBS MODERATE 35: CPT | Performed by: PHYSICAL MEDICINE & REHABILITATION

## 2022-05-09 PROCEDURE — A9270 NON-COVERED ITEM OR SERVICE: HCPCS | Performed by: PHYSICAL MEDICINE & REHABILITATION

## 2022-05-09 PROCEDURE — 97530 THERAPEUTIC ACTIVITIES: CPT

## 2022-05-09 PROCEDURE — 85027 COMPLETE CBC AUTOMATED: CPT

## 2022-05-09 PROCEDURE — 700111 HCHG RX REV CODE 636 W/ 250 OVERRIDE (IP): Performed by: PHYSICAL MEDICINE & REHABILITATION

## 2022-05-09 PROCEDURE — 36415 COLL VENOUS BLD VENIPUNCTURE: CPT

## 2022-05-09 PROCEDURE — 700102 HCHG RX REV CODE 250 W/ 637 OVERRIDE(OP): Performed by: PHYSICAL MEDICINE & REHABILITATION

## 2022-05-09 PROCEDURE — 700101 HCHG RX REV CODE 250: Performed by: PHYSICAL MEDICINE & REHABILITATION

## 2022-05-09 PROCEDURE — 97112 NEUROMUSCULAR REEDUCATION: CPT

## 2022-05-09 PROCEDURE — 80048 BASIC METABOLIC PNL TOTAL CA: CPT

## 2022-05-09 RX ORDER — NAPROXEN 500 MG/1
250 TABLET ORAL 2 TIMES DAILY PRN
Status: DISCONTINUED | OUTPATIENT
Start: 2022-05-09 | End: 2022-05-10

## 2022-05-09 RX ORDER — OXYCODONE HYDROCHLORIDE 5 MG/1
2.5-5 TABLET ORAL EVERY 6 HOURS PRN
Status: DISCONTINUED | OUTPATIENT
Start: 2022-05-09 | End: 2022-05-10

## 2022-05-09 RX ORDER — BISMUTH SUBSALICYLATE 262 MG/1
262 TABLET, CHEWABLE ORAL
Status: DISCONTINUED | OUTPATIENT
Start: 2022-05-09 | End: 2022-05-11 | Stop reason: HOSPADM

## 2022-05-09 RX ORDER — ACETAMINOPHEN 500 MG
1000 TABLET ORAL 3 TIMES DAILY
Status: DISCONTINUED | OUTPATIENT
Start: 2022-05-09 | End: 2022-05-10

## 2022-05-09 RX ADMIN — OMEPRAZOLE 20 MG: 20 CAPSULE, DELAYED RELEASE ORAL at 08:08

## 2022-05-09 RX ADMIN — MULTIPLE VITAMINS W/ MINERALS TAB 1 TABLET: TAB at 11:35

## 2022-05-09 RX ADMIN — ACETAMINOPHEN 1000 MG: 500 TABLET, FILM COATED ORAL at 14:59

## 2022-05-09 RX ADMIN — LIDOCAINE 1 PATCH: 50 PATCH TOPICAL at 08:08

## 2022-05-09 RX ADMIN — ENOXAPARIN SODIUM 40 MG: 40 INJECTION SUBCUTANEOUS at 08:08

## 2022-05-09 RX ADMIN — NAPROXEN 250 MG: 500 TABLET ORAL at 08:08

## 2022-05-09 RX ADMIN — ACETAMINOPHEN 1000 MG: 500 TABLET, FILM COATED ORAL at 20:05

## 2022-05-09 RX ADMIN — OXYCODONE 5 MG: 5 TABLET ORAL at 01:53

## 2022-05-09 RX ADMIN — OXYCODONE 5 MG: 5 TABLET ORAL at 08:09

## 2022-05-09 ASSESSMENT — GAIT ASSESSMENTS
DISTANCE (FEET): 150
DISTANCE (FEET): 225
ASSISTIVE DEVICE: PLATFORM WALKER
GAIT LEVEL OF ASSIST: STANDBY ASSIST
GAIT LEVEL OF ASSIST: STANDBY ASSIST
DEVIATION: ANTALGIC;BRADYKINETIC;STEP TO
ASSISTIVE DEVICE: PLATFORM WALKER
DEVIATION: ANTALGIC;STEP TO;DECREASED BASE OF SUPPORT;BRADYKINETIC;OTHER (COMMENT)

## 2022-05-09 ASSESSMENT — PAIN DESCRIPTION - PAIN TYPE
TYPE: ACUTE PAIN

## 2022-05-09 NOTE — THERAPY
Physical Therapy   Daily Treatment     Patient Name: Carmita Miller  Age:  72 y.o., Sex:  female  Medical Record #: [id number]  Today's Date: 5/9/2022     Precautions  Precautions: Fall Risk,Toe Touch Weight Bearing Right Lower Extremity,Non Weight Bearing Right Upper Extremity    Subjective    Patient agreeable to session. Has no complaints.  Asks about acquiring a w/c for appts     Objective       05/09/22 1101   Gait Functional Level of Assist    Gait Level Of Assist Standby Assist   Assistive Device Platform Walker   Distance (Feet) 150   # of Times Distance was Traveled 2  (75 x 1)   Deviation Antalgic;Bradykinetic;Step To   Interdisciplinary Plan of Care Collaboration   IDT Collaboration with  Physical Therapist   Patient Position at End of Therapy Seated;Call Light within Reach   Collaboration Comments CLOF with primary PT   PT Total Time Spent   PT Individual Total Time Spent (Mins) 30   PT Charge Group   PT Therapeutic Activities 2   STS training from various surfaces throughout facility: approx 16-22 inch heights, firm/soft surfaces, w/ and w/o arm rests.  Min assist from low chair w/o arm rests all others CGA.      Visual demonstration of hand/foot placements and sequencing of STS transfer to avoid weight bearing through RLE.     Discussed w/c and notified primary PT.     Intremittent cues needed for TTWB adherence with STS and gait.    Assessment    Patient able to complete STS from varying surface firmness and heights to PFFWW and SBA.  Good awareness of NWB RUE, but does have one instance of WB on RLE, improves with reminders.     Strengths: Able to follow instructions,Effective communication skills,Independent prior level of function,Motivated for self care and independence,Pleasant and cooperative,Supportive family,Willingly participates in therapeutic activities  Barriers: Decreased endurance,Fatigue,Generalized weakness,Home accessibility,Impaired activity tolerance,Impaired balance,Limited  mobility,Pain,Pain poorly managed    Plan       D/c IRF RATNA's, standing & seated HEP's, FT for car transfer     Passport items to be completed:  in/out of a vehicle, complete caregiver training    Physical Therapy Problems (Active)       Problem: Mobility       Dates: Start: 05/04/22         Goal: STG-Within one week, patient will propel wheelchair community x 50 feet with L LE/L UE and SBA       Dates: Start: 05/04/22   Expected End: 05/11/22         Goal Note filed on 05/05/22 0833 by Hannah Jo DPT       Pt just evaluated              Goal: STG-Within one week, patient will ambulate household distance x 20 feet with PFWW and min A       Dates: Start: 05/04/22   Expected End: 05/11/22         Goal Note filed on 05/05/22 0833 by Hannah Jo DPT       Pt just evaluated                 Problem: Mobility Transfers       Dates: Start: 05/04/22         Goal: STG-Within one week, patient will transfer bed to chair with PFWW and SBA       Dates: Start: 05/04/22   Expected End: 05/11/22         Goal Note filed on 05/05/22 0833 by Hannah Jo DPT       Pt just evaluated                 Problem: PT-Long Term Goals       Dates: Start: 05/04/22         Goal: LTG-By discharge, patient will ambulate x 50 feet with PFWW, mod I        Dates: Start: 05/04/22   Expected End: 05/11/22            Goal: LTG-By discharge, patient will transfer one surface to another with PFWW, mod I        Dates: Start: 05/04/22   Expected End: 05/11/22            Goal: LTG-By discharge, patient will transfer in/out of a car with PFWW and SPV       Dates: Start: 05/04/22   Expected End: 05/11/22

## 2022-05-09 NOTE — PROGRESS NOTES
"REHABILITATION PROGRESS NOTE    Date of Admission: 5/3/2022    [facility] Code / Diagnosis to Support: 0008.4 - Orthopaedic Disorders: Status Post Major Multiple Fractures  Etiologic Diagnosis: Fracture of femoral neck, right, closed (HCC)    SUBJECTIVE  Patient seen in room sitting on edge of bed  GI: last BM 5/8, continent  : continent  Psych: sleeping better at night  MSK: pain well controlled    I have performed a physical exam and reviewed and updated history and plan today (5/9/2022).     MEDICATIONS:  Current Facility-Administered Medications   Medication Dose   • oxyCODONE immediate-release (ROXICODONE) tablet 2.5-5 mg  2.5-5 mg   • polyethylene glycol/lytes (MIRALAX) PACKET 1 Packet  1 Packet   • enoxaparin (Lovenox) inj 40 mg  40 mg   • Respiratory Therapy Consult     • Pharmacy Consult Request ...Pain Management Review 1 Each  1 Each   • omeprazole (PRILOSEC) capsule 20 mg  20 mg   • acetaminophen (TYLENOL) tablet 1,000 mg  1,000 mg   • lidocaine (LIDODERM) 5 % 1 Patch  1 Patch   • artificial tears ophthalmic solution 1 Drop  1 Drop   • benzocaine-menthol (Cepacol) lozenge 1 Lozenge  1 Lozenge   • mag hydrox-al hydrox-simeth (MAALOX PLUS ES or MYLANTA DS) suspension 20 mL  20 mL   • ondansetron (ZOFRAN ODT) dispertab 4 mg  4 mg    Or   • ondansetron (ZOFRAN) syringe/vial injection 4 mg  4 mg   • sodium chloride (OCEAN) 0.65 % nasal spray 2 Spray  2 Spray   • therapeutic multivitamin-minerals (THERAGRAN-M) tablet 1 Tablet  1 Tablet   • melatonin tablet 3 mg  3 mg   • magnesium hydroxide (MILK OF MAGNESIA) suspension 30 mL  30 mL   • naproxen (NAPROSYN) tablet 250 mg  250 mg       PHYSICAL EXAM:     VITAL SIGNS:   height is 1.676 m (5' 6\") and weight is 84 kg (185 lb 3 oz). Her oral temperature is 36.6 °C (97.8 °F). Her blood pressure is 123/70 and her pulse is 61. Her respiration is 17 and oxygen saturation is 95%.     General: well-groomed sitting up in no acute distress, no visitors present  Eyes: no scleral " icterus or conjunctival injection  Ears, nose, mouth and throat: moist oral mucosa  Cardiovascular: good peripheral perfusion  Respiratory: breathing comfortably without use of accessory muscles, no cyanosis  Gastrointestinal: nondistended  Genitourinary: no indwelling baker  Neurologic:  Mental status:  A&Ox4 (person, place, date, situation) answers questions appropriately follows commands  Speech: fluent, no aphasia or dysarthria    Tone: no spasticity noted  Psychiatric: appropriate affect  Hematologic/lymphatic/immunologic: no IV access    LABS:  Recent Labs     05/09/22  0526   SODIUM 140   POTASSIUM 4.9   CHLORIDE 108   CO2 24   GLUCOSE 89   BUN 16   CREATININE 0.64   CALCIUM 9.4     Recent Labs     05/09/22  0526   WBC 3.3*   RBC 2.92*   HEMOGLOBIN 9.3*   HEMATOCRIT 28.6*   MCV 97.9*   MCH 31.8   MCHC 32.5*   RDW 51.2*   PLATELETCT 79*   MPV 9.9           PRIMARY REHAB DIAGNOSIS:    This patient is a 72 y.o. female admitted for acute inpatient rehabilitation with Fracture of femoral neck, right, closed (HCC).    IMPAIRMENTS:   ADLs/IADLs  Mobility    SECONDARY DIAGNOSIS/MEDICAL CO-MORBIDITIES AFFECTING FUNCTION:  -pancytopenia  -Dizziness with standing  -Postoperative anemia    RELEVANT CHANGES SINCE PREADMISSION EVALUATION:    Status unchanged    The patient's rehabilitation potential is Good  The patient's medical prognosis is good    PLAN:   Discussion and Recommendations:   1. The patient requires an acute inpatient rehabilitation program with a coordinated program of care at an intensity and frequency not available at a lower level of care. This recommendation is substantiated by the patient's medical physicians who recommend that the patient's intervention and assessment of medical issues needs to be done at an acute level of care for patient's safety and maximum outcome.   2. A coordinated program of care will be supplied by an interdisciplinary team of physical therapy, occupational therapy, rehab  physician, rehab nursing, and, if needed, speech therapy and rehab psychology. Rehab team presents a patient-specific rehabilitation and education program concentrating on prevention of future problems related to accessibility, mobility, skin, bowel, bladder, sexuality, and psychosocial and medical/surgical problems.   3. Need for Rehabilitation Physician: The rehab physician will be evaluating the patient on a multi-weekly basis to help coordinate the program of care. The rehab physician communicates between medical physicians, therapists, and nurses to maximize the patient's potential outcome. Specific areas in which the rehab physician will be providing daily assessment include the following:   A. Assessing the patient's heart rate and blood pressure response (vitals monitoring) to activity and making adjustments in medications or conservative measures as needed.   B. The rehab physician will be assessing the frequency at which the program can be increased to allow the patient to reach optimal functional outcome.   C. The rehab physician will also provide assessments in daily skin care, especially in light of patient's impairments in mobility.   D. The rehab physician will provide special expertise in understanding how to work with functional impairment and recommend appropriate interventions, compensatory techniques, and education that will facilitate the patient's outcome.   4. Rehab R.N.   The rehab RN will be working with patient to carry over in room mobility and activities of daily living when the patient is not in 3 hours of skilled therapy. Rehab nursing will be working in conjunction with rehab physician to address all the medical issues above and continue to assess laboratory work and discuss abnormalities with the treating physicians, assess vitals, and response to activity, and discuss and report abnormalities with the rehab physician. Rehab RN will also continue daily skin care, supervise  bladder/bowel program, instruct in medication administration, and ensure patient safety.   5. Rehab Therapy: Therapies to treat at intensity and frequency of (may change after completion of evaluation by all therapeutic disciplines):       PT:  Physical therapy to address mobility, transfer, gait training and evaluation for adaptive equipment needs 1-1.5 hour/day at least 5 days/week for the duration of the ELOS (see below)       OT:  Occupational therapy to address ADLs, self-care, home management training, functional mobility/transfers and assistive device evaluation, and community re-integration 1 -1.5 hour/day at least 5 days/week for the duration of the ELOS (see below).     Medical management / Rehabilitation Issues/ Adverse Potential as part of rehabilitation plan     REHABILITATION ISSUES/ADVERSE POTENTIAL and MEDICAL CO-MORBIDITIES/ADVERSE POTENTIAL AFFECTING FUNCTION:    ##MSK  #Impaired ADLs and mobility  Patient is admitted to [facility] for comprehensive rehabilitation therapy as described.   Rehabilitation nursing monitors bowel and bladder control, educates on medication administration, co-morbidities and monitors patient safety.  Anticipated discharge date: 5/11/2022  Anticipated discharge destination: home with spouse  Prognosis: good  Equipment: TBD  Postdischarge therapies: TBD  Followup: orthopedic surgery (Dr. Cassius Zuniga MD)   Code: full resuscitation    #Posttraumatic pain, acute, improving  #Postsurgical pain, acute, improving  Continue tylenol 1000mg TID, lidocaine patch 5% one patch daily  Ordered decrease to naproxen 250mg BID PRN pain  Ordered discontinuation of robaxin 750mg TID PRN spasms  Ordered decrease to oxycodone 2.5-5mg Q6hr PRN pain  Encourage ice to affected locations    #Acute impacted transcervical right femoral neck fracture s/p Open treatment with internal fixation of right femoral neck fracture on 4/29/2022 by Dr. Cassius Zuniga MD  #Acute right  distal radial metaphyseal fracture s/p Closed treatment without manipulation  #old right healed pubic rami fractures with bone remodeling  toe-touch weightbearing of right lower limb and nonweight bearing of right wrist (ok for platform walker)     ##NEURO  Monitor    #RESP  Respiratory therapy: RT performs O2 management prn, breathing retraining, pulmonary hygiene and bronchospasm management prn to optimize participation in therapies.     ##CARDIAC  DVT prophylaxis:  Patient is on lovenox 40mg SC daily for anticoagulation upon transfer. Encourage OOB. Monitor daily for signs and symptoms of DVT including but not limited to swelling and pain to prevent the development of DVT that may interfere with therapies.    ##GI  GI prophylaxis:  On prilosec 20mg daily to prevent gastritis/dyspepsia which may interfere with therapies.  #At risk of opioid induced constipation  Goal of one continent BM daily  Ordered decrease to miralax 17g daily    #At risk for malnutrition  Prealbumin 14.3  Dietician monitors nutrient intake, recommend supplements prn and provide nutrition education to pt/family to promote optimal nutrition for wound healing/recovery.   Orders Placed This Encounter   Procedures   • Diet Order Diet: Regular     Standing Status:   Standing     Number of Occurrences:   1     Order Specific Question:   Diet:     Answer:   Regular [1]   Ordered multivitamin daily    ##/RENAL  Continent and without retention    #Mild hyperphosphatemia  Monitor    ##HEME  #Pancytopenia  Vitamin B12 496  iron panel, ferritin, Vitamin D 25 OH wnl  Platelets mildly improving  Follow up with PCP  Monitor    ##SKIN  Skin/dermal ulcer prophylaxis: Monitor for new skin conditions with q.2 h. turns as required to prevent the development of skin breakdown.     Clement Ugarte, DO  Physical Medicine and Rehabilitation

## 2022-05-09 NOTE — THERAPY
Physical Therapy   Daily Treatment     Patient Name: Carmita Miller  Age:  72 y.o., Sex:  female  Medical Record #: [id number]  Today's Date: 5/9/2022     Precautions  Precautions: Fall Risk,Toe Touch Weight Bearing Right Lower Extremity,Non Weight Bearing Right Upper Extremity    Subjective    Pt found in in Br, ready for therapy.     Objective       05/09/22 0831   Gait Functional Level of Assist    Gait Level Of Assist Standby Assist   Assistive Device Platform Walker   Distance (Feet) 225   # of Times Distance was Traveled 2   Deviation Antalgic;Step To;Decreased Base Of Support;Bradykinetic;Other (Comment)  (cues for step through and upright posture)   Sitting Lower Body Exercises   Nustep Time (See Comments);Resistance Level 4;Resistance Level 5  (L UE and BLE's, R LE TTWBing av 45-55 SPM)   Standing Lower Body Exercises   Hip Flexion 1 set of 15;Right    Mini Squat Partial;1 set of 15  (R LE)   Other Exercises Standing balance in // bars: tandem EC, heel toe EC, SLS L LE only EC, dynamic standing: clock taps R LE EC int UE touch down   Bed Mobility    Sit to Stand Modified Independent   Neuro-Muscular Treatments   Neuro-Muscular Treatments Tactile Cuing;Sequencing   Interdisciplinary Plan of Care Collaboration   IDT Collaboration with  Physical Therapist   Patient Position at End of Therapy Seated   Collaboration Comments re: CLOF, barriers   PT Total Time Spent   PT Individual Total Time Spent (Mins) 60   PT Charge Group   PT Gait Training 1   PT Therapeutic Exercise 2   PT Neuromuscular Re-Education / Balance 1       Assessment    Cont to require cues for upright posture & step through gait, otherwise SPV /Mary with mobility.   Limited by gait speed, endurance    Strengths: Able to follow instructions,Effective communication skills,Independent prior level of function,Motivated for self care and independence,Pleasant and cooperative,Supportive family,Willingly participates in therapeutic activities  Barriers:  Decreased endurance,Fatigue,Generalized weakness,Home accessibility,Impaired activity tolerance,Impaired balance,Limited mobility,Pain,Pain poorly managed    Plan    D/c IRF RATNA's, standing & seated HEP's, FT for car transfer    Passport items to be completed:  in/out of a vehicle, complete caregiver training    Physical Therapy Problems (Active)       Problem: Mobility       Dates: Start: 05/04/22         Goal: STG-Within one week, patient will propel wheelchair community x 50 feet with L LE/L UE and SBA       Dates: Start: 05/04/22   Expected End: 05/11/22         Goal Note filed on 05/05/22 0833 by Hannah Jo DPT       Pt just evaluated              Goal: STG-Within one week, patient will ambulate household distance x 20 feet with PFWW and min A       Dates: Start: 05/04/22   Expected End: 05/11/22         Goal Note filed on 05/05/22 0833 by Hannah Jo DPT       Pt just evaluated                 Problem: Mobility Transfers       Dates: Start: 05/04/22         Goal: STG-Within one week, patient will transfer bed to chair with PFWW and SBA       Dates: Start: 05/04/22   Expected End: 05/11/22         Goal Note filed on 05/05/22 0833 by Hannah Jo DPT       Pt just evaluated                 Problem: PT-Long Term Goals       Dates: Start: 05/04/22         Goal: LTG-By discharge, patient will ambulate x 50 feet with PFWW, mod I        Dates: Start: 05/04/22   Expected End: 05/11/22            Goal: LTG-By discharge, patient will transfer one surface to another with PFWW, mod I        Dates: Start: 05/04/22   Expected End: 05/11/22            Goal: LTG-By discharge, patient will transfer in/out of a car with PFWW and SPV       Dates: Start: 05/04/22   Expected End: 05/11/22

## 2022-05-09 NOTE — PROGRESS NOTES
Patient care assumed. Report received from day RN Elina. Patient is alert and calm, resting in bed. Call light and bedside table within reach. Will continue to monitor.

## 2022-05-09 NOTE — THERAPY
Occupational Therapy  Daily Treatment     Patient Name: Carmita Miller  Age:  72 y.o., Sex:  female  Medical Record #: [id number]  Today's Date: 5/9/2022     Precautions  Precautions: Fall Risk,Toe Touch Weight Bearing Right Lower Extremity,Non Weight Bearing Right Upper Extremity         Subjective    Pt seen at 10:00 am (30 min act tx) and 15:00 pm (UB therex 60 min tx)    Pt reported chronic L shoulder deficits d/t humeral fx in 2017 and was interested in learning how to improve shoulder mobility      Objective       05/09/22 1001   Standing Upper Body Exercises   Comments UB HEP provided with focus on L shoulder (d/t a previous injury) internal/external rotation mobility and stretch using dowel, strap, orange resistance band. B shoulder AROM with no weight/high reps: shoulder rolls, shoulder flexion, abduction, scapular protraction   Interdisciplinary Plan of Care Collaboration   IDT Collaboration with  Nursing   Patient Position at End of Therapy Seated;Call Light within Reach;Tray Table within Reach   Collaboration Comments re: changing ace wrap on R lower arm cast   OT Total Time Spent   OT Individual Total Time Spent (Mins) 90   OT Charge Group   OT Therapy Activity 1   OT Therapeutic Exercise  5     Answered pt's questions re: covering R lower arm cast for showers, family training, review DME    Assessment    Provided pt with UB HEP with handout and pt able to demo back appropriately with no issues    Strengths: Able to follow instructions,Alert and oriented,Effective communication skills,Good insight into deficits/needs,Independent prior level of function,Making steady progress towards goals,Manages pain appropriately,Motivated for self care and independence,Pleasant and cooperative,Supportive family,Willingly participates in therapeutic activities  Barriers: Decreased endurance,Generalized weakness,Impaired balance,Impaired activity tolerance,Home accessibility,Pain    Plan    general strength/endurance to  assist with bathroom transfers, IADL's at platform FWW, standing balance    Occupational Therapy Goals (Active)       Problem: Dressing       Dates: Start: 05/04/22         Goal: STG-Within one week, patient will dress UB with supervision       Dates: Start: 05/04/22   Expected End: 05/11/22         Goal Note filed on 05/05/22 0817 by Tuyet Dykes, MS,OTR/L       New admit - not yet addressed              Goal: STG-Within one week, patient will dress LB with Min A using DME/AE as needed       Dates: Start: 05/04/22   Expected End: 05/11/22         Goal Note filed on 05/05/22 0817 by Tuyet Dykes, MS,OTR/L       New admit - not yet addressed                 Problem: Functional Transfers       Dates: Start: 05/04/22         Goal: STG-Within one week, patient will transfer to step in shower with Mod A using DME/AE as needed       Dates: Start: 05/04/22   Expected End: 05/11/22         Goal Note filed on 05/05/22 0817 by Tuyet Dykes, MS,OTR/L       New admit - not yet addressed                 Problem: OT Long Term Goals       Dates: Start: 05/04/22         Goal: LTG-By discharge, patient will complete basic self care tasks with supervision using DME/AE as needed       Dates: Start: 05/04/22   Expected End: 05/11/22            Goal: LTG-By discharge, patient will perform bathroom transfers with CGA using DME/AE as needed       Dates: Start: 05/04/22   Expected End: 05/11/22

## 2022-05-09 NOTE — CARE PLAN
Problem: Fall Risk - Rehab  Goal: Patient will remain free from falls  Outcome: Progressing  Note: Em [place] Fall risk Assessment Score: 9    Low fall risk interventions   - Call light within reach   - Yellow  socks   - Belongings within reach   - Bed in the lowest position   - Pt is mod I in the room using platform walker     Problem: Infection  Goal: Patient will remain free from infection  Outcome: Progressing  Note: Patient remains free from s/s infection; afebrile.  Will continue to monitor.

## 2022-05-09 NOTE — CARE PLAN
The patient is Stable - Low risk of patient condition declining or worsening    Shift Goals  Clinical Goals: safety  Patient Goals: sleep well    Problem: Pain - Standard  Goal: Alleviation of pain or a reduction in pain to the patient’s comfort goal  Outcome: Progressing. Patient able to verbalize pain level and verbalizes an acceptable level of pain.      Problem: Skin Integrity  Goal: Patient's skin integrity will be maintained or improve  Outcome: Progressing. Patient's right hip incision CTI with staples and appears to be healing adequately. Incision site cleansed with NS, updated picture uploaded to chart, and new island dressing placed. No s/s of infection noted.

## 2022-05-10 PROBLEM — E66.9 OBESITY (BMI 30-39.9): Status: RESOLVED | Noted: 2022-04-30 | Resolved: 2022-05-10

## 2022-05-10 PROBLEM — E87.1 HYPONATREMIA: Status: RESOLVED | Noted: 2022-05-02 | Resolved: 2022-05-10

## 2022-05-10 PROBLEM — D75.89 MACROCYTOSIS: Status: RESOLVED | Noted: 2022-04-30 | Resolved: 2022-05-10

## 2022-05-10 PROCEDURE — A9270 NON-COVERED ITEM OR SERVICE: HCPCS | Performed by: PHYSICAL MEDICINE & REHABILITATION

## 2022-05-10 PROCEDURE — 770010 HCHG ROOM/CARE - REHAB SEMI PRIVAT*

## 2022-05-10 PROCEDURE — 700102 HCHG RX REV CODE 250 W/ 637 OVERRIDE(OP): Performed by: PHYSICAL MEDICINE & REHABILITATION

## 2022-05-10 PROCEDURE — 700101 HCHG RX REV CODE 250: Performed by: PHYSICAL MEDICINE & REHABILITATION

## 2022-05-10 PROCEDURE — 97530 THERAPEUTIC ACTIVITIES: CPT

## 2022-05-10 PROCEDURE — 97535 SELF CARE MNGMENT TRAINING: CPT

## 2022-05-10 PROCEDURE — 99232 SBSQ HOSP IP/OBS MODERATE 35: CPT | Performed by: PHYSICAL MEDICINE & REHABILITATION

## 2022-05-10 PROCEDURE — 97110 THERAPEUTIC EXERCISES: CPT

## 2022-05-10 PROCEDURE — 97116 GAIT TRAINING THERAPY: CPT

## 2022-05-10 RX ORDER — LIDOCAINE 50 MG/G
1 PATCH TOPICAL DAILY
Qty: 10 PATCH | Refills: 1 | Status: SHIPPED | OUTPATIENT
Start: 2022-05-11 | End: 2022-05-11 | Stop reason: SDUPTHER

## 2022-05-10 RX ORDER — ACETAMINOPHEN 500 MG
500 TABLET ORAL EVERY 6 HOURS PRN
Status: DISCONTINUED | OUTPATIENT
Start: 2022-05-10 | End: 2022-05-11 | Stop reason: HOSPADM

## 2022-05-10 RX ORDER — POLYETHYLENE GLYCOL 3350 17 G/17G
17 POWDER, FOR SOLUTION ORAL
Qty: 10 EACH | Refills: 1 | Status: SHIPPED | OUTPATIENT
Start: 2022-05-10 | End: 2022-05-11 | Stop reason: SDUPTHER

## 2022-05-10 RX ORDER — ACETAMINOPHEN 500 MG
500 TABLET ORAL EVERY 6 HOURS PRN
Qty: 30 TABLET | Refills: 2 | Status: SHIPPED | OUTPATIENT
Start: 2022-05-10 | End: 2022-05-11 | Stop reason: SDUPTHER

## 2022-05-10 RX ORDER — NAPROXEN 500 MG/1
250 TABLET ORAL 2 TIMES DAILY PRN
Status: DISCONTINUED | OUTPATIENT
Start: 2022-05-10 | End: 2022-05-11 | Stop reason: HOSPADM

## 2022-05-10 RX ORDER — HYDROCODONE BITARTRATE AND ACETAMINOPHEN 5; 325 MG/1; MG/1
.5-1 TABLET ORAL EVERY 6 HOURS PRN
Qty: 14 TABLET | Refills: 0 | Status: SHIPPED | OUTPATIENT
Start: 2022-05-10 | End: 2022-05-10 | Stop reason: SDUPTHER

## 2022-05-10 RX ORDER — NAPROXEN 250 MG/1
250 TABLET ORAL 2 TIMES DAILY PRN
Qty: 14 TABLET | Refills: 0 | Status: SHIPPED | OUTPATIENT
Start: 2022-05-10 | End: 2022-05-10

## 2022-05-10 RX ORDER — POLYETHYLENE GLYCOL 3350 17 G/17G
17 POWDER, FOR SOLUTION ORAL
Qty: 10 EACH | Refills: 1 | Status: SHIPPED | OUTPATIENT
Start: 2022-05-10 | End: 2022-05-10

## 2022-05-10 RX ORDER — OXYCODONE HYDROCHLORIDE 5 MG/1
2.5 TABLET ORAL EVERY 6 HOURS PRN
Status: DISCONTINUED | OUTPATIENT
Start: 2022-05-10 | End: 2022-05-11 | Stop reason: HOSPADM

## 2022-05-10 RX ORDER — NAPROXEN 250 MG/1
250 TABLET ORAL 2 TIMES DAILY PRN
Qty: 14 TABLET | Refills: 0 | Status: SHIPPED | OUTPATIENT
Start: 2022-05-10 | End: 2022-05-11 | Stop reason: SDUPTHER

## 2022-05-10 RX ORDER — ACETAMINOPHEN 500 MG
500 TABLET ORAL 3 TIMES DAILY
Status: DISCONTINUED | OUTPATIENT
Start: 2022-05-10 | End: 2022-05-11 | Stop reason: HOSPADM

## 2022-05-10 RX ORDER — HYDROCODONE BITARTRATE AND ACETAMINOPHEN 5; 325 MG/1; MG/1
.5-1 TABLET ORAL EVERY 6 HOURS PRN
Qty: 14 TABLET | Refills: 0 | Status: SHIPPED | OUTPATIENT
Start: 2022-05-10 | End: 2022-05-11 | Stop reason: SDUPTHER

## 2022-05-10 RX ORDER — ACETAMINOPHEN 500 MG
500 TABLET ORAL EVERY 6 HOURS PRN
Qty: 30 TABLET | Refills: 2 | Status: SHIPPED | OUTPATIENT
Start: 2022-05-10 | End: 2022-05-10

## 2022-05-10 RX ADMIN — OXYCODONE 2.5 MG: 5 TABLET ORAL at 08:23

## 2022-05-10 RX ADMIN — MULTIPLE VITAMINS W/ MINERALS TAB 1 TABLET: TAB at 11:24

## 2022-05-10 RX ADMIN — OXYCODONE 2.5 MG: 5 TABLET ORAL at 13:47

## 2022-05-10 RX ADMIN — ACETAMINOPHEN 1000 MG: 500 TABLET, FILM COATED ORAL at 08:24

## 2022-05-10 RX ADMIN — LIDOCAINE 1 PATCH: 50 PATCH TOPICAL at 08:30

## 2022-05-10 RX ADMIN — OMEPRAZOLE 20 MG: 20 CAPSULE, DELAYED RELEASE ORAL at 08:23

## 2022-05-10 RX ADMIN — ACETAMINOPHEN 500 MG: 500 TABLET, FILM COATED ORAL at 16:21

## 2022-05-10 RX ADMIN — OXYCODONE 5 MG: 5 TABLET ORAL at 03:10

## 2022-05-10 RX ADMIN — ACETAMINOPHEN 500 MG: 500 TABLET, FILM COATED ORAL at 21:12

## 2022-05-10 ASSESSMENT — GAIT ASSESSMENTS
GAIT LEVEL OF ASSIST: SUPERVISED
DISTANCE (FEET): 220
GAIT LEVEL OF ASSIST: STANDBY ASSIST
DISTANCE (FEET): 300
ASSISTIVE DEVICE: PLATFORM WALKER
ASSISTIVE DEVICE: PLATFORM WALKER
DEVIATION: ANTALGIC;BRADYKINETIC;OTHER (COMMENT)

## 2022-05-10 ASSESSMENT — PAIN DESCRIPTION - PAIN TYPE
TYPE: ACUTE PAIN

## 2022-05-10 ASSESSMENT — PATIENT HEALTH QUESTIONNAIRE - PHQ9
1. LITTLE INTEREST OR PLEASURE IN DOING THINGS: NOT AT ALL
1. LITTLE INTEREST OR PLEASURE IN DOING THINGS: NOT AT ALL
2. FEELING DOWN, DEPRESSED, IRRITABLE, OR HOPELESS: NOT AT ALL
2. FEELING DOWN, DEPRESSED, IRRITABLE, OR HOPELESS: NOT AT ALL
SUM OF ALL RESPONSES TO PHQ9 QUESTIONS 1 AND 2: 0
SUM OF ALL RESPONSES TO PHQ9 QUESTIONS 1 AND 2: 0

## 2022-05-10 ASSESSMENT — ACTIVITIES OF DAILY LIVING (ADL)
BED_CHAIR_WHEELCHAIR_TRANSFER_DESCRIPTION: ADAPTIVE EQUIPMENT
SHOWER_TRANSFER_LEVEL_OF_ASSIST: REQUIRES PHYSICAL ASSIST WITH SHOWER TRANSFER
TOILET_TRANSFER_LEVEL_OF_ASSIST: ABLE TO COMPLETE TOILET TRANSFER WITHOUT ASSIST
TOILETING_LEVEL_OF_ASSIST: ABLE TO COMPLETE TOILETING WITHOUT ASSIST

## 2022-05-10 NOTE — THERAPY
"Physical Therapy   Daily Treatment     Patient Name: Carmita Miller  Age:  72 y.o., Sex:  female  Medical Record #: [id number]  Today's Date: 5/10/2022     Precautions  Precautions: Fall Risk,Toe Touch Weight Bearing Right Lower Extremity,Non Weight Bearing Right Upper Extremity    Subjective    Pt found in room, spouse and brother present.  Agreeable to FT.     Objective       05/10/22 1401   Gait Functional Level of Assist    Gait Level Of Assist Supervised   Assistive Device Platform Walker   Distance (Feet) 220   # of Times Distance was Traveled 2   Deviation Step To;Bradykinetic;Decreased Base Of Support;Decreased Heel Strike   Stairs Functional Level of Assist   Level of Assist with Stairs Standby Assist   # of Stairs Climbed 2  (up/down posterior approach on 4\" and 6\" curb with platform walker)   Stairs Description Verbal cueing;Walker   Interdisciplinary Plan of Care Collaboration   IDT Collaboration with  Family / Caregiver   Collaboration Comments re: FT completed   PT Total Time Spent   PT Individual Total Time Spent (Mins) 30   PT Charge Group   PT Therapeutic Activities 2     FT completed with spouse:  -car transfer performed Swati  RLE into car, cues for hand placement  -encouraged spouse to only assist as needed with R LE.   -edu on use of gait belt/safe   -curb training performed post approach CGA  -Edu & used imaging for pt edu on hardward in hip.    Assessment    Pt and spouse demo readiness for d/c good safety, insight and communication.  Both feel ready for d/c.    Strengths: Able to follow instructions,Effective communication skills,Good carryover of learning,Making steady progress towards goals,Pleasant and cooperative,Willingly participates in therapeutic activities  Barriers: Decreased endurance,Generalized weakness,Impaired balance,Pain    Plan    Dc home with home health      Physical Therapy Problems (Active)       Problem: PT-Long Term Goals       Dates: Start: 05/04/22         Goal: " LTG-By discharge, patient will transfer in/out of a car with PFWW and SPV       Dates: Start: 05/04/22   Expected End: 05/11/22

## 2022-05-10 NOTE — CARE PLAN
The patient is Stable - Low risk of patient condition declining or worsening    Shift Goals  Clinical Goals: pain control  Patient Goals: sleep well    Progress made toward(s) clinical / shift goals:      Problem: Knowledge Deficit - Standard  Goal: Patient and family/care givers will demonstrate understanding of plan of care, disease process/condition, diagnostic tests and medications  Outcome: Progressing  Patient is Mod I in room with platform walker.      Problem: Pain - Standard  Goal: Alleviation of pain or a reduction in pain to the patient’s comfort goal  Outcome: Progressing  Patient c/o 6/10 R hip and arm pain. Medicated her with oxycodone 2.5mg for pain with positive result. Up and participated in therapy. Tolerated well.        Patient is not progressing towards the following goals:

## 2022-05-10 NOTE — PROGRESS NOTES
"REHABILITATION PROGRESS NOTE    Date of Admission: 5/3/2022    [facility] Code / Diagnosis to Support: 0008.4 - Orthopaedic Disorders: Status Post Major Multiple Fractures  Etiologic Diagnosis: Fracture of femoral neck, right, closed (HCC)    SUBJECTIVE  Patient seen in room, resting comfortably  GI: last BM 5/10, continent  : continent  Psych: sleeping at baseline  MSK: pain well controlled. Primary pain in ankle - discussed active rest    Encouraged patient to follow up with hematologist    I have performed a physical exam and reviewed and updated history and plan today (5/10/2022).       MEDICATIONS:  Current Facility-Administered Medications   Medication Dose   • acetaminophen (TYLENOL) tablet 500 mg  500 mg   • acetaminophen (TYLENOL) tablet 500 mg  500 mg   • naproxen (NAPROSYN) tablet 250 mg  250 mg   • oxyCODONE immediate-release (ROXICODONE) tablet 2.5 mg  2.5 mg   • bismuth subsalicylate (PEPTO-BISMOL) tablet 262 mg  262 mg   • polyethylene glycol/lytes (MIRALAX) PACKET 1 Packet  1 Packet   • enoxaparin (Lovenox) inj 40 mg  40 mg   • Respiratory Therapy Consult     • Pharmacy Consult Request ...Pain Management Review 1 Each  1 Each   • omeprazole (PRILOSEC) capsule 20 mg  20 mg   • lidocaine (LIDODERM) 5 % 1 Patch  1 Patch   • artificial tears ophthalmic solution 1 Drop  1 Drop   • benzocaine-menthol (Cepacol) lozenge 1 Lozenge  1 Lozenge   • mag hydrox-al hydrox-simeth (MAALOX PLUS ES or MYLANTA DS) suspension 20 mL  20 mL   • ondansetron (ZOFRAN ODT) dispertab 4 mg  4 mg    Or   • ondansetron (ZOFRAN) syringe/vial injection 4 mg  4 mg   • sodium chloride (OCEAN) 0.65 % nasal spray 2 Spray  2 Spray   • therapeutic multivitamin-minerals (THERAGRAN-M) tablet 1 Tablet  1 Tablet   • melatonin tablet 3 mg  3 mg   • magnesium hydroxide (MILK OF MAGNESIA) suspension 30 mL  30 mL       PHYSICAL EXAM:     VITAL SIGNS:   height is 1.676 m (5' 6\") and weight is 84 kg (185 lb 3 oz). Her oral temperature is 36.7 °C (98.1 " °F). Her blood pressure is 109/66 and her pulse is 62. Her respiration is 17 and oxygen saturation is 96%.     General: well-groomed sitting up in no acute distress, no visitors present  Eyes: no scleral icterus or conjunctival injection  Ears, nose, mouth and throat: moist oral mucosa  Cardiovascular: good peripheral perfusion, no pallor  Respiratory: breathing comfortably without use of accessory muscles  Gastrointestinal: nondistended  Genitourinary: no indwelling baker  Neurologic:  Mental status:  A&Ox4 (person, place, date, situation) answers questions appropriately follows commands  Speech: fluent, no aphasia or dysarthria    Tone: no spasticity noted  Psychiatric: appropriate affect  Hematologic/lymphatic/immunologic: no IV access    LABS:  Recent Labs     05/09/22  0526   SODIUM 140   POTASSIUM 4.9   CHLORIDE 108   CO2 24   GLUCOSE 89   BUN 16   CREATININE 0.64   CALCIUM 9.4     Recent Labs     05/09/22  0526   WBC 3.3*   RBC 2.92*   HEMOGLOBIN 9.3*   HEMATOCRIT 28.6*   MCV 97.9*   MCH 31.8   MCHC 32.5*   RDW 51.2*   PLATELETCT 79*   MPV 9.9           PRIMARY REHAB DIAGNOSIS:    This patient is a 72 y.o. female admitted for acute inpatient rehabilitation with Fracture of femoral neck, right, closed (HCC).    IMPAIRMENTS:   ADLs/IADLs  Mobility    SECONDARY DIAGNOSIS/MEDICAL CO-MORBIDITIES AFFECTING FUNCTION:  -pancytopenia  -Dizziness with standing, resolved  -Postoperative anemia    RELEVANT CHANGES SINCE PREADMISSION EVALUATION:    Status unchanged    The patient's rehabilitation potential is Good  The patient's medical prognosis is good    PLAN:   Discussion and Recommendations:   1. The patient requires an acute inpatient rehabilitation program with a coordinated program of care at an intensity and frequency not available at a lower level of care. This recommendation is substantiated by the patient's medical physicians who recommend that the patient's intervention and assessment of medical issues needs  to be done at an acute level of care for patient's safety and maximum outcome.   2. A coordinated program of care will be supplied by an interdisciplinary team of physical therapy, occupational therapy, rehab physician, rehab nursing, and, if needed, speech therapy and rehab psychology. Rehab team presents a patient-specific rehabilitation and education program concentrating on prevention of future problems related to accessibility, mobility, skin, bowel, bladder, sexuality, and psychosocial and medical/surgical problems.   3. Need for Rehabilitation Physician: The rehab physician will be evaluating the patient on a multi-weekly basis to help coordinate the program of care. The rehab physician communicates between medical physicians, therapists, and nurses to maximize the patient's potential outcome. Specific areas in which the rehab physician will be providing daily assessment include the following:   A. Assessing the patient's heart rate and blood pressure response (vitals monitoring) to activity and making adjustments in medications or conservative measures as needed.   B. The rehab physician will be assessing the frequency at which the program can be increased to allow the patient to reach optimal functional outcome.   C. The rehab physician will also provide assessments in daily skin care, especially in light of patient's impairments in mobility.   D. The rehab physician will provide special expertise in understanding how to work with functional impairment and recommend appropriate interventions, compensatory techniques, and education that will facilitate the patient's outcome.   4. Rehab R.N.   The rehab RN will be working with patient to carry over in room mobility and activities of daily living when the patient is not in 3 hours of skilled therapy. Rehab nursing will be working in conjunction with rehab physician to address all the medical issues above and continue to assess laboratory work and discuss  abnormalities with the treating physicians, assess vitals, and response to activity, and discuss and report abnormalities with the rehab physician. Rehab RN will also continue daily skin care, supervise bladder/bowel program, instruct in medication administration, and ensure patient safety.   5. Rehab Therapy: Therapies to treat at intensity and frequency of (may change after completion of evaluation by all therapeutic disciplines):       PT:  Physical therapy to address mobility, transfer, gait training and evaluation for adaptive equipment needs 1-1.5 hour/day at least 5 days/week for the duration of the ELOS (see below)       OT:  Occupational therapy to address ADLs, self-care, home management training, functional mobility/transfers and assistive device evaluation, and community re-integration 1 -1.5 hour/day at least 5 days/week for the duration of the ELOS (see below).     Medical management / Rehabilitation Issues/ Adverse Potential as part of rehabilitation plan     REHABILITATION ISSUES/ADVERSE POTENTIAL and MEDICAL CO-MORBIDITIES/ADVERSE POTENTIAL AFFECTING FUNCTION:    ##MSK  #Impaired ADLs and mobility  Patient is admitted to [facility] for comprehensive rehabilitation therapy as described.   Rehabilitation nursing monitors bowel and bladder control, educates on medication administration, co-morbidities and monitors patient safety.  Anticipated discharge date: 5/11/2022  Anticipated discharge destination: home with spouse  Prognosis: good  Equipment: walker with platform, wheelchair with cushion  Postdischarge therapies: TBD  Followup: orthopedic surgery (Dr. Cassius Zuniga MD), hematology   Code: full resuscitation    #Posttraumatic pain, acute, improving  #Postsurgical pain, acute, improving  Continue lidocaine patch 5% one patch daily, naproxen 250mg BID PRN pain, oxycodone 2.5-5mg Q6hr PRN pain  Ordered decrease to tylenol 500mg TID +PRN pain  Will discharge patient with norco  5/325mg PRN pain (14 tablets for 7 days)  Encourage ice to affected locations    #Acute impacted transcervical right femoral neck fracture s/p Open treatment with internal fixation of right femoral neck fracture on 4/29/2022 by Dr. Cassius Zuniga MD  #Acute right distal radial metaphyseal fracture s/p Closed treatment without manipulation  #old right healed pubic rami fractures with bone remodeling  toe-touch weightbearing of right lower limb and nonweight bearing of right wrist (ok for platform walker)     ##NEURO  Monitor    #RESP  Respiratory therapy: RT performs O2 management prn, breathing retraining, pulmonary hygiene and bronchospasm management prn to optimize participation in therapies.     ##CARDIAC  DVT prophylaxis:  Patient is on lovenox 40mg SC daily for anticoagulation upon transfer. Encourage OOB. Monitor daily for signs and symptoms of DVT including but not limited to swelling and pain to prevent the development of DVT that may interfere with therapies. On day of discharge, patient POD #12 so will not discharge patient with chemoprophylaxis.    ##GI  GI prophylaxis:  On prilosec 20mg daily to prevent gastritis/dyspepsia which may interfere with therapies.  #At risk of opioid induced constipation  Goal of one continent BM daily  Continue miralax 17g daily    #At risk for malnutrition  Prealbumin 14.3  Dietician monitors nutrient intake, recommend supplements prn and provide nutrition education to pt/family to promote optimal nutrition for wound healing/recovery.   Orders Placed This Encounter   Procedures   • Diet Order Diet: Regular     Standing Status:   Standing     Number of Occurrences:   1     Order Specific Question:   Diet:     Answer:   Regular [1]   Continue multivitamin daily    ##/RENAL  Continent and without retention    #Mild hyperphosphatemia  Monitor    ##HEME  #Pancytopenia  Vitamin B12 496  iron panel, ferritin, Vitamin D 25 OH wnl  Platelets mildly improving  Follow up with  PCP  Monitor    ##SKIN  Skin/dermal ulcer prophylaxis: Monitor for new skin conditions with q.2 h. turns as required to prevent the development of skin breakdown.     Clement Ugarte, DO  Physical Medicine and Rehabilitation

## 2022-05-10 NOTE — CARE PLAN
Problem: Fall Risk - Rehab  Goal: Patient will remain free from falls  Outcome: Progressing  Note: Em [place]cido Fall risk Assessment Score: 9     Low fall risk interventions   - Call light within reach   - Yellow  socks   - Belongings within reach   - Bed in the lowest position   - Pt is mod I in the room using platform walker     Problem: Psychosocial  Goal: Patient's level of anxiety will decrease  Outcome: Progressing  Note: Pt demonstrates appropriate mood and behavior; willing to participate in all therapies and activities this shift. Will continue to monitor.

## 2022-05-10 NOTE — PROGRESS NOTES
Received report from garrett RN. Patient in bed locked in lowest position with call light in reach. POC discussed. All questions answered.

## 2022-05-10 NOTE — THERAPY
Physical Therapy   Daily Treatment     Patient Name: Carmita Miller  Age:  72 y.o., Sex:  female  Medical Record #: [id number]  Today's Date: 5/10/2022     Precautions  Precautions: (P) Fall Risk,Toe Touch Weight Bearing Right Lower Extremity,Non Weight Bearing Right Upper Extremity    Subjective    Agreeable to participate, eager to go home tomorrow. States she didn't sleep well last night as roommate was up to the bathroom several times in addition to roommate set off the bed alarm several times.      Objective          05/10/22 0901   Precautions   Precautions Fall Risk;Toe Touch Weight Bearing Right Lower Extremity;Non Weight Bearing Right Upper Extremity   Pain   Intervention Medication (see MAR)   Pain 0 - 10 Group   Location Hip;Wrist   Location Orientation Right   Pain Rating Scale (NPRS) 3   Description Aching   Comfort Goal Stay Alert;Perform Activity;Comfort with Movement   Cognition    Level of Consciousness Alert   Gait Functional Level of Assist    Gait Level Of Assist Standby Assist   Assistive Device Platform Walker   Distance (Feet) 300   # of Times Distance was Traveled 3   Deviation Antalgic;Bradykinetic;Other (Comment)  (as she fatigues, she increases WBing on RLE)   Stairs Functional Level of Assist   Level of Assist with Stairs Contact Guard Assist   # of Stairs Climbed 3   Stairs Description Safety concerns;Verbal cueing;Walker  (4inch step, climbs backwards for ascent better than hopping forward. Is able to hop down while facing forwards)   Transfer Functional Level of Assist   Bed, Chair, Wheelchair Transfer Standby Assist   Bed Chair Wheelchair Transfer Description Adaptive equipment   Supine Lower Body Exercise   Bridges Two Legged;3 sets of 10  (RLE on bolster)   Straight Leg Raises 3 sets of 10   Heel Slide 3 sets of 10   Standing Lower Body Exercises   Hamstring Curl 2 sets of 10  (RLE only)   Hip Abduction 2 sets of 10  (RLE only)   Mini Squat 2 sets of 10  (LLE only)   Bed Mobility     Supine to Sit Modified Independent   Sit to Supine Modified Independent   Sit to Stand Modified Independent   Scooting Modified Independent   Rolling Supervised;Modified Independent   Interdisciplinary Plan of Care Collaboration   IDT Collaboration with  Occupational Therapist   Patient Position at End of Therapy Seated;Other (Comments)  (in gym, handed over to OT)   Strengths & Barriers   Strengths Able to follow instructions;Effective communication skills;Good carryover of learning;Making steady progress towards goals;Pleasant and cooperative;Willingly participates in therapeutic activities   Barriers Decreased endurance;Generalized weakness;Impaired balance;Pain   PT Total Time Spent   PT Individual Total Time Spent (Mins) 60   PT Charge Group   PT Gait Training 3   PT Therapeutic Exercise 1       Assessment    Patient demonstrates good progress towards goals, good insight into safety and compensation for functional deficits. Still requires cues for TTWB as she fatigues; she increases WB as she fatigues and requires cues for rest break/ activity pacing. See discharge note for discharge summary.    Strengths: (P) Able to follow instructions,Effective communication skills,Good carryover of learning,Making steady progress towards goals,Pleasant and cooperative,Willingly participates in therapeutic activities  Barriers: (P) Decreased endurance,Generalized weakness,Impaired balance,Pain    Plan    Family training for climbing 4inch step into home, family training for getting into/ out of car.    Passport items to be completed:  All competed    Into/out of car, 4inch step with family. All other items signed off on this date.     Physical Therapy Problems (Active)       Problem: Mobility       Dates: Start: 05/04/22         Goal: STG-Within one week, patient will propel wheelchair community x 50 feet with L LE/L UE and SBA       Dates: Start: 05/04/22   Expected End: 05/11/22         Goal Note filed on 05/05/22 0833  by Hannah Jo DPT       Pt just evaluated              Goal: STG-Within one week, patient will ambulate household distance x 20 feet with PFWW and min A       Dates: Start: 05/04/22   Expected End: 05/11/22         Goal Note filed on 05/05/22 0833 by Hannah Jo DPT       Pt just evaluated                 Problem: Mobility Transfers       Dates: Start: 05/04/22         Goal: STG-Within one week, patient will transfer bed to chair with PFWW and SBA       Dates: Start: 05/04/22   Expected End: 05/11/22         Goal Note filed on 05/05/22 0833 by Hannah Jo DPT       Pt just evaluated                 Problem: PT-Long Term Goals       Dates: Start: 05/04/22         Goal: LTG-By discharge, patient will ambulate x 50 feet with PFWW, mod I        Dates: Start: 05/04/22   Expected End: 05/11/22            Goal: LTG-By discharge, patient will transfer one surface to another with PFWW, mod I        Dates: Start: 05/04/22   Expected End: 05/11/22            Goal: LTG-By discharge, patient will transfer in/out of a car with PFWW and SPV       Dates: Start: 05/04/22   Expected End: 05/11/22

## 2022-05-10 NOTE — THERAPY
Recreational Therapy  Daily Treatment     Patient Name: Carmita Miller  AGE:  72 y.o., SEX:  female  Medical Record #: [id number]  Today's Date: 5/10/2022       Subjective    Pt reporting that having a distraction to pain has proven beneficial.  Pt reporting that her friends had shared that distraction is a good technique for pain management.      Objective       05/09/22 1401   Functional Ability Status - Cognitive   Attention Span Remains on Task   Comprehension Follows Three Step Commands   Judgment Able to Make Independent Decisions   Functional Ability Status - Emotional    Affect Appropriate;Bright   Mood Appropriate   Behavior Appropriate   Skilled Intervention    Skilled Intervention Relaxation / Coping Skills   Skilled Intervention Comments Leisure activity for pain management and distraction   Interdisciplinary Plan of Care Collaboration   IDT Collaboration with  Nursing   Patient Position at End of Therapy In Bed;Call Light within Reach;Tray Table within Reach   Collaboration Comments Nursing wrapping her R ankle   Strengths & Barriers   Strengths Able to follow instructions;Alert and oriented;Motivated for self care and independence;Pleasant and cooperative;Supportive family;Willingly participates in therapeutic activities   Barriers Fatigue;Decreased endurance;Pain   Treatment Time   Total Time Spent (mins) 30   Procedural Tracking   Procedural Tracking Community Re-Integration;Community Skills Development;Leisure Skills Awareness;Leisure Skills Development;Gross Motor Functional Leisure Skills       Assessment    Pt shown how to play the game "[employer] and a pain management distraction.     Strengths: (P) Able to follow instructions,Alert and oriented,Motivated for self care and independence,Pleasant and cooperative,Supportive family,Willingly participates in therapeutic activities  Barriers: (P) Fatigue,Decreased endurance,Pain    Plan    dc    Passport items to be completed:  Verbalize two positive  leisure activities, discuss returning to work, hobbies, community groups or volunteer activities, explore community resources

## 2022-05-10 NOTE — DISCHARGE PLANNING
Cm  Met with pt   She is all set for dc planned for tomorrow 5/11  Referral made to [facility].   [employer] has provided R platform walker and will deliver wc/ cushion to pt's home.  Spouse obtained a hospital bed / private pay.   Follow up w/ Dr Zuniga.   Sent referral to Dr Holm's office to establish with her as new pcp; they will call pt when MD is accepting new pt's.   [facility] placard faxed to [facility]   [email or web address]  Spouse to transport home.   Pt is agreement w/ above.     Reviewed signed copy of IM and answered all questions.  Plan:  Dc home 5/11

## 2022-05-10 NOTE — THERAPY
Occupational Therapy  Daily Treatment     Patient Name: Carmita Miller  Age:  72 y.o., Sex:  female  Medical Record #: [id number]  Today's Date: 5/10/2022     Precautions  Precautions: Fall Risk,Toe Touch Weight Bearing Right Lower Extremity,Non Weight Bearing Right Upper Extremity         Subjective    Pt seen at 10:00 am (30 min review rehab passport tx) and 14:30 pm (60 min shower ADL tx)    Spouse present for afternoon session     Objective       05/10/22 1431   Functional Level of Assist   Eating Independent   Grooming Modified Independent;Standing   Bathing Modified Independent   Upper Body Dressing Modified Independent   Lower Body Dressing Modified Independent   Tub / Shower Transfers Modified Independent   Interdisciplinary Plan of Care Collaboration   Patient Position at End of Therapy Seated;Call Light within Reach;Tray Table within Reach   OT Total Time Spent   OT Individual Total Time Spent (Mins) 90   OT Charge Group   OT Self Care / ADL 4   OT Therapy Activity 2     Functional mobility room<>gym Mod I with platform FWW, in room<>bathroom Mod I with platform FWW    Reviewed Rehab Passport, completed family training with spouse: reviewed shower transfers, bathroom modifications, plastic cover for R cast    Assessment    Pt completed shower routine at Mod I overall using platform FWW, shower bench, GB's. Family training completed - spouse receptive to edu and demo good understanding of recommendations. Pt with no further questions/concerns re: d/c tomorrow and home safety     Strengths: Able to follow instructions,Alert and oriented,Effective communication skills,Good insight into deficits/needs,Independent prior level of function,Making steady progress towards goals,Manages pain appropriately,Motivated for self care and independence,Pleasant and cooperative,Supportive family,Willingly participates in therapeutic activities  Barriers: Decreased endurance,Generalized weakness,Impaired balance,Impaired  activity tolerance,Home accessibility,Pain    Plan    D/c tomorrow     Occupational Therapy Goals (Active)       Problem: Dressing       Dates: Start: 05/04/22         Goal: STG-Within one week, patient will dress UB with supervision       Dates: Start: 05/04/22   Expected End: 05/11/22         Goal Note filed on 05/05/22 0817 by Tuyet Dykes, MS,OTR/L       New admit - not yet addressed              Goal: STG-Within one week, patient will dress LB with Min A using DME/AE as needed       Dates: Start: 05/04/22   Expected End: 05/11/22         Goal Note filed on 05/05/22 0817 by Tuyet Dykes, MS,OTR/L       New admit - not yet addressed                 Problem: Functional Transfers       Dates: Start: 05/04/22         Goal: STG-Within one week, patient will transfer to step in shower with Mod A using DME/AE as needed       Dates: Start: 05/04/22   Expected End: 05/11/22         Goal Note filed on 05/05/22 0817 by Tuyet Dykes MS,OTR/L       New admit - not yet addressed                 Problem: OT Long Term Goals       Dates: Start: 05/04/22         Goal: LTG-By discharge, patient will complete basic self care tasks with supervision using DME/AE as needed       Dates: Start: 05/04/22   Expected End: 05/11/22            Goal: LTG-By discharge, patient will perform bathroom transfers with CGA using DME/AE as needed       Dates: Start: 05/04/22   Expected End: 05/11/22

## 2022-05-10 NOTE — DISCHARGE INSTRUCTIONS
[facility] NURSING DISCHARGE INSTRUCTIONS    Blood Pressure : 120/57  Weight: 84 kg (185 lb 3 oz)  Nursing recommendations for Carmita Miller at time of discharge are as follows:  Client and Family Member verbalized understanding of all discharge instructions and prescriptions.     Review all your home medications and newly ordered medications with your doctor and/or pharmacist. Follow medication instructions as directed by your doctor and/or pharmacist.    Pain Management: Tylenol, Naproxen, Norco, and Lidocaine patches as needed  Discharge Pain Medication Instructions: See medication list  Comfort Goal: Comfort with Movement, Perform Activity, Sleep Comfortably, Stay Alert  Notify your primary care provider if pain is unrelieved with these measures, if the pain is new, or increased in intensity.    Discharge Skin Characteristics: Warm, Dry  Discharge Skin Exam: Clear  Wound 04/29/22 Incision Hip Right (Active)   Site Assessment Intact;Red   Periwound Assessment Clean;Dry;Intact   Closure Staples removed   Drainage Amount Scant   Drainage Description Blood   Wound Cleansing Soap & Water   Dressing Options Steri-Strip;Silicone adhesive foam   Dressing Changed Changed   Dressing Status Clean;Dry;Intact   Dressing Change/Treatment Frequency As Needed (every few days, adhesive foam only)     Skin / Wound Care Instructions: Please contact your primary care physician for any change in skin integrity.     If You Have Surgical Incisions / Wounds:  Monitor surgical site(s) for signs of increased swelling, redness or symptoms of drainage from the site or fever as this could indicate signs and symptoms of infection. If these symptoms are noted, notifiy your primary care provider.    Discharge Safety Instructions: No Supervision Needed     Discharge Safety Concerns: No Concerns Noted  The interdisciplinary team has made recommendation that you do not require supervision in the house due to demonstration  of safety with ADL's and IADLS and problem solving skills  Anti-embolic stockings are not required to increase circulation to the lower extremities.    Discharge Diet: Regular     Discharge Liquids: Thin  Discharge Bowel Function: Continent  Please contact your primary care physician for any changes in bowel habits.  Discharge Bowel Program: None  Discharge Bladder Function: Continent  Discharge Urinary Devices: Brief      Occupational Therapy Discharge Instructions for Carmita Miller  5/10/2022    Level of Assist Required for Eating: Able to Complete Eating without Assist  Level of Assist Required for Grooming: Able to Complete Grooming without Assist  Level of Assist Required for Dressing: Able to Complete Dressing without Assist  Level of Assist Required for Toileting: Able to Complete Toileting without Assist  Level of Assist Required for Toilet Transfer: Able to Complete Toilet Transfer without Assist  Level of Assist Required for Bathing: Able to Complete Bathing without Assist  Equipment for Bathing: Hand Held Shower Head,Long Handled Sponge,Grab Bars in Tub / Shower,Tub Transfer Bench  Level of Assist Required for Shower Transfer: Requires Physical Assist with Shower Transfer  Equipment for Shower Transfer: Grab Bars in Tub / Shower,Tub Transfer Bench  Level of Assist Required for Home Mgmt: Requires Supervision with Home Management  Level of Assist Required for Meal Prep: Requires Supervision with Meal Preparation  Driving: May not Drive, Please Contact Physician for Further Information  Home Exercise Program: Refer to Home Exercise Program Handout for Details      Physical Therapy Discharge Instructions for Carmita Miller  5/10/2022    Weight Bearing Status - Patient Should: Bear Toe-Touch Weight Right Leg,Bear No Weight Right Arm  Level of Assist Required for Ambulation: Assist for Balance on Curbs,Supervision on Flat Surfaces  Distance Patient May Ambulate: household distances  Device Recommended for  Ambulation: Platform Walker  Level of Assist Required to Propel Wheelchair: Requires No Assist  Level of Assist Required for Transfers: Supervision  Device Recommended for Transfers: Platform Walker  Home Exercise Program: Refer to Home Exercise Program Handout for Details  Prosthesis / Orthosis Recommendation / Location: No Prosthesis  or Orthosis Recommended  Best of luck to you Carmita! It has been a pleasure working with you.  Stay in touch and remember you are the best!  Chelle Flores, PT, DPT      Incision and Drainage, Care After  This sheet gives you information about how to care for yourself after your procedure. Your health care provider may also give you more specific instructions. If you have problems or questions, contact your health care provider.  What can I expect after the procedure?  After the procedure, it is common to have:  Pain or discomfort around the incision site.  Blood, fluid, or pus (drainage) from the incision.  Redness and firm skin around the incision site.  Follow these instructions at home:  Medicines  Take over-the-counter and prescription medicines only as told by your health care provider.  If you were prescribed an antibiotic medicine, use or take it as told by your health care provider. Do not stop using the antibiotic even if you start to feel better.  Wound care  Follow instructions from your health care provider about how to take care of your wound. Make sure you:  Wash your hands with soap and water before and after you change your bandage (dressing). If soap and water are not available, use hand .  Change your dressing and packing as told by your health care provider.  If your dressing is dry or stuck when you try to remove it, moisten or wet the dressing with saline or water so that it can be removed without harming your skin or tissues.  If your wound is packed, leave it in place until your health care provider tells you to remove it. To remove the packing, moisten  or wet the packing with saline or water so that it can be removed without harming your skin or tissues.  Leave stitches (sutures), skin glue, or adhesive strips in place. These skin closures may need to stay in place for 2 weeks or longer. If adhesive strip edges start to loosen and curl up, you may trim the loose edges. Do not remove adhesive strips completely unless your health care provider tells you to do that.  Check your wound every day for signs of infection. Check for:  More redness, swelling, or pain.  More fluid or blood.  Warmth.  Pus or a bad smell.  If you were sent home with a drain tube in place, follow instructions from your health care provider about:  How to empty it.  How to care for it at home.    General instructions  Rest the affected area.  Do not take baths, swim, or use a hot tub until your health care provider approves. Ask your health care provider if you may take showers. You may only be allowed to take sponge baths.  Return to your normal activities as told by your health care provider. Ask your health care provider what activities are safe for you. Your health care provider may put you on activity or lifting restrictions.  The incision will continue to drain. It is normal to have some clear or slightly bloody drainage. The amount of drainage should lessen each day.  Do not apply any creams, ointments, or liquids unless you have been told to by your health care provider.  Keep all follow-up visits as told by your health care provider. This is important.  Contact a health care provider if:  Your cyst or abscess returns.  You have a fever or chills.  You have more redness, swelling, or pain around your incision.  You have more fluid or blood coming from your incision.  Your incision feels warm to the touch.  You have pus or a bad smell coming from your incision.  You have red streaks above or below the incision site.  Get help right away if:  You have severe pain or bleeding.  You cannot  eat or drink without vomiting.  You have decreased urine output.  You become short of breath.  You have chest pain.  You cough up blood.  The affected area becomes numb or starts to tingle.  These symptoms may represent a serious problem that is an emergency. Do not wait to see if the symptoms will go away. Get medical help right away. Call your local emergency services (911 in the [place] Do not drive yourself to the hospital.  Summary  After this procedure, it is common to have fluid, blood, or pus coming from the surgery site.  Follow all home care instructions. You will be told how to take care of your incision, how to check for infection, and how to take medicines.  If you were prescribed an antibiotic medicine, take it as told by your health care provider. Do not stop taking the antibiotic even if you start to feel better.  Contact a health care provider if you have increased redness, swelling, or pain around your incision. Get help right away if you have chest pain, you vomit, you cough up blood, or you have shortness of breath.  Keep all follow-up visits as told by your health care provider. This is important.  This information is not intended to replace advice given to you by your health care provider. Make sure you discuss any questions you have with your health care provider.  Document Released: 03/11/2013 Document Revised: 11/18/2019 Document Reviewed: 11/18/2019  Elsevier Patient Education © 2020 [employer] Inc[employer]      Fall Prevention in the Home, Adult  Falls can cause injuries. They can happen to people of all ages. There are many things you can do to make your home safe and to help prevent falls. Ask for help when making these changes, if needed.  What actions can I take to prevent falls?  General Instructions  Use good lighting in all rooms. Replace any light bulbs that burn out.  Turn on the lights when you go into a dark area. Use night-lights.  Keep items that you use often in easy-to-reach places.  Lower the shelves around your home if necessary.  Set up your furniture so you have a clear path. Avoid moving your furniture around.  Do not have throw rugs and other things on the floor that can make you trip.  Avoid walking on wet floors.  If any of your floors are uneven, fix them.  Add color or contrast paint or tape to clearly fauzia and help you see:  Any grab bars or handrails.  First and last steps of stairways.  Where the edge of each step is.  If you use a stepladder:  Make sure that it is fully opened. Do not climb a closed stepladder.  Make sure that both sides of the stepladder are locked into place.  Ask someone to hold the stepladder for you while you use it.  If there are any pets around you, be aware of where they are.  What can I do in the bathroom?         Keep the floor dry. Clean up any water that spills onto the floor as soon as it happens.  Remove soap buildup in the tub or shower regularly.  Use non-skid mats or decals on the floor of the tub or shower.  Attach bath mats securely with double-sided, non-slip rug tape.  If you need to sit down in the shower, use a plastic, non-slip stool.  Install grab bars by the toilet and in the tub and shower. Do not use towel bars as grab bars.  What can I do in the bedroom?  Make sure that you have a light by your bed that is easy to reach.  Do not use any sheets or blankets that are too big for your bed. They should not hang down onto the floor.  Have a firm chair that has side arms. You can use this for support while you get dressed.  What can I do in the kitchen?  Clean up any spills right away.  If you need to reach something above you, use a strong step stool that has a grab bar.  Keep electrical cords out of the way.  Do not use floor polish or wax that makes floors slippery. If you must use wax, use non-skid floor wax.  What can I do with my stairs?  Do not leave any items on the stairs.  Make sure that you have a light switch at the top of the  stairs and the bottom of the stairs. If you do not have them, ask someone to add them for you.  Make sure that there are handrails on both sides of the stairs, and use them. Fix handrails that are broken or loose. Make sure that handrails are as long as the stairways.  Install non-slip stair treads on all stairs in your home.  Avoid having throw rugs at the top or bottom of the stairs. If you do have throw rugs, attach them to the floor with carpet tape.  Choose a carpet that does not hide the edge of the steps on the stairway.  Check any carpeting to make sure that it is firmly attached to the stairs. Fix any carpet that is loose or worn.  What can I do on the outside of my home?  Use bright outdoor lighting.  Regularly fix the edges of walkways and driveways and fix any cracks.  Remove anything that might make you trip as you walk through a door, such as a raised step or threshold.  Trim any bushes or trees on the path to your home.  Regularly check to see if handrails are loose or broken. Make sure that both sides of any steps have handrails.  Install guardrails along the edges of any raised decks and porches.  Clear walking paths of anything that might make someone trip, such as tools or rocks.  Have any leaves, snow, or ice cleared regularly.  Use sand or salt on walking paths during winter.  Clean up any spills in your garage right away. This includes grease or oil spills.  What other actions can I take?  Wear shoes that:  Have a low heel. Do not wear high heels.  Have rubber bottoms.  Are comfortable and fit you well.  Are closed at the toe. Do not wear open-toe sandals.  Use tools that help you move around (mobility aids) if they are needed. These include:  Canes.  Walkers.  Scooters.  Crutches.  Review your medicines with your doctor. Some medicines can make you feel dizzy. This can increase your chance of falling.  Ask your doctor what other things you can do to help prevent falls.  Where to find more  information  Centers for Disease Control and Prevention, STEAD[place]: https://cdc.gov  National [place] on A[place]: https://by6uese.wendy.nih.gov  Contact a doctor if:  You are afraid of falling at home.  You feel weak, drowsy, or dizzy at home.  You fall at home.  Summary  There are many simple things that you can do to make your home safe and to help prevent falls.  Ways to make your home safe include removing tripping hazards and installing grab bars in the bathroom.  Ask for help when making these changes in your home.  This information is not intended to replace advice given to you by your health care provider. Make sure you discuss any questions you have with your health care provider.  Document Released: 10/14/2010 Document Revised: 04/09/2020 Document Reviewed: 08/02/2018  Elsevier Patient Education © 2020 [employer]      Suicidal Feelings: How to Help Yourself  Suicide is when you end your own life. There are many things you can do to help yourself feel better when struggling with these feelings. Many services and people are available to support you and others who struggle with similar feelings.   If you ever feel like you may hurt yourself or others, or have thoughts about taking your own life, get help right away. To get help:  Call your local emergency services (911 in the [place]  The [facility] health and human services helpline (211 in the [place]  Go to your nearest emergency department.  Call a suicide hotline to speak with a trained counselor. The following suicide hotlines are available in the [place]:  3-811-910-TALK (1-968.891.1478).  7-156-FBXXYLX (1-733.225.1794).  1-400.503.2733. This is a hotline for [place] speakers.  1-714.583.2993. This is a hotline for TTY users.  9-165-9-U-LAURA (1-678.723.7157). This is a hotline for lesbian, bowser, bisexual, transgender, or questioning youth.  For a list of hotlines in [place], visit  www.suicide.org/hotlines/international/rjxxkv-hgfvbaf-qbrktpti.html  Contact a crisis center or a local suicide prevention center. To find a crisis center or suicide prevention center:  Call your local hospital, clinic, community service organization, mental health center, social service provider, or health department. Ask for help with connecting to a crisis center.  For a list of crisis centers in the [place], visit: suicidepreventionlifeline.org  For a list of crisis centers in [place], visit: suicideprevention.ca  How to help yourself feel better    Promise yourself that you will not do anything extreme when you have suicidal feelings. Remember, there is hope. Many people have gotten through suicidal thoughts and feelings, and you can too. If you have had these feelings before, remind yourself that you can get through them again.  Let family, friends, teachers, or counselors know how you are feeling. Try not to separate yourself from those who care about you and want to help you. Talk with someone every day, even if you do not feel sociable. Face-to-face conversation is best to help them understand your feelings.  Contact a mental health care provider and work with this person regularly.  Make a safety plan that you can follow during a crisis. Include phone numbers of suicide prevention hotlines, mental health professionals, and trusted friends and family members you can call during an emergency. Save these numbers on your phone.  If you are thinking of taking a lot of medicine, give your medicine to someone who can give it to you as prescribed. If you are on antidepressants and are concerned you will overdose, tell your health care provider so that he or she can give you safer medicines.  Try to stick to your routines. Follow a schedule every day. Make self-care a priority.  Make a list of realistic goals, and cross them off when you achieve them. Accomplishments can give you a sense of worth.  Wait until  you are feeling better before doing things that you find difficult or unpleasant.  Do things that you have always enjoyed to take your mind off your feelings. Try reading a book, or listening to or playing music. Spending time outside, in nature, may help you feel better.  Follow these instructions at home:    Visit your primary health care provider every year for a checkup.  Work with a mental health care provider as needed.  Eat a well-balanced diet, and eat regular meals.  Get plenty of rest.  Exercise if you are able. Just 30 minutes of exercise each day can help you feel better.  Take over-the-counter and prescription medicines only as told by your health care provider. Ask your mental health care provider about the possible side effects of any medicines you are taking.  Do not use alcohol or drugs, and remove these substances from your home.  Remove weapons, poisons, knives, and other deadly items from your home.  General recommendations  Keep your living space well lit.  When you are feeling well, write yourself a letter with tips and support that you can read when you are not feeling well.  Remember that life's difficulties can be sorted out with help. Conditions can be treated, and you can learn behaviors and ways of thinking that will help you.  Where to find more information  National Suicide Prevention Lifeline: www.suicidepreventionlifeline.org  Hopeline: www.hopeline.com  American Foundation for Suicide Prevention: www.afsp.org  The [place] Project (for lesbian, bowser, bisexual, transgender, or questioning youth): www.thetrevorproject.org  Contact a health care provider if:  You feel as though you are a burden to others.  You feel agitated, angry, vengeful, or have extreme mood swings.  You have withdrawn from family and friends.  Get help right away if:  You are talking about suicide or wishing to die.  You start making plans for how to commit suicide.  You feel that you have no reason to live.  You  start making plans for putting your affairs in order, saying goodbye, or giving your possessions away.  You feel guilt, shame, or unbearable pain, and it seems like there is no way out.  You are frequently using drugs or alcohol.  You are engaging in risky behaviors that could lead to death.  If you have any of these symptoms, get help right away. Call emergency services, go to your nearest emergency department or crisis center, or call a suicide crisis helpline.  Summary  Suicide is when you take your own life.  Promise yourself that you will not do anything extreme when you have suicidal feelings.  Let family, friends, teachers, or counselors know how you are feeling.  Get help right away if you feel as though life is getting too tough to handle and you are thinking about suicide.  This information is not intended to replace advice given to you by your health care provider. Make sure you discuss any questions you have with your health care provider.  Document Released: 06/23/2004 Document Revised: 04/09/2020 Document Reviewed: 07/31/2018  Elsevier Patient Education © 2020 Elsevier Inc.

## 2022-05-10 NOTE — CARE PLAN
The patient is Stable - Low risk of patient condition declining or worsening    Shift Goals  Clinical Goals: safety, vitals WDL, pain management  Patient Goals: sleep well    Progress made toward(s) clinical / shift goals:    Problem: Knowledge Deficit - Standard  Goal: Patient and family/care givers will demonstrate understanding of plan of care, disease process/condition, diagnostic tests and medications  Outcome: Progressing     Problem: Pain - Standard  Goal: Alleviation of pain or a reduction in pain to the patient’s comfort goal  Outcome: Progressing     Problem: Fall Risk - Rehab  Goal: Patient will remain free from falls  Outcome: Progressing     Problem: Bladder / Voiding  Goal: Patient will establish and maintain regular urinary output  Outcome: Progressing

## 2022-05-10 NOTE — DISCHARGE SUMMARY
Rehab Discharge Summary    Admission Date: 5/3/2022    Discharge Date: 5/11/2022    Attending Physician: Clement Ugarte DO    Admission Diagnosis:   Active Hospital Problems    Diagnosis    • *Fracture of femoral neck, right, closed (HCC)    • Hip fracture requiring operative repair, right, closed, with routine healing, subsequent encounter    • Pancytopenia (HCC)    • Closed fracture of distal end of right radius        Discharge Diagnosis:  Active Hospital Problems    Diagnosis    • *Fracture of femoral neck, right, closed (HCC)    • Hip fracture requiring operative repair, right, closed, with routine healing, subsequent encounter    • Pancytopenia (HCC)    • Closed fracture of distal end of right radius        HPI per H&P:  The patient is a 72 y.o. female with a past medical history of bilateral knee arthritis; now admitted for acute inpatient rehabilitation with severe functional debility secondary to multiple orthopedic fractures. Per documentation, patient presented to [facility] on 4/29/2022  5:21 PM for pain after fall on her right side.       Patient was found to have:   no acute fracture or malalignment of the right knee  impacted transcervical right femoral neck fracture  old right healed pubic rami fractures with bone remodeling  right distal radial metaphyseal fracture     She underwent the following procedures on 4/29/2022 by Dr. Cassius Zuniga MD:  Open treatment with internal fixation of right femoral neck fracture.  Closed treatment without manipulation, right extra-articular distal radius   fracture.  toe-touch weightbearing of right lower limb and nonweight bearing of right wrist (ok for platform walker)      Recovery was complicated by:  -Thrombocytopenia: Abdominal ultrasound with no acute abnormalities  -Dizziness with standing  -Constipation  -Postoperative anemia     Patient was evaluated by Rehab Medicine physician and Physical Therapy and Occupational Therapy and determined to be  appropriate for acute inpatient rehab and was transferred to [facility] on 5/3/2022.    Discharge physical examination  Vitals:    05/10/22 0700   BP: 109/66   Pulse: 62   Resp: 17   Temp:    SpO2: 96%     General: well-groomed sitting up in no acute distress, ++visitors present  Eyes: no scleral icterus or conjunctival injection  Ears, nose, mouth and throat: moist oral mucosa  Cardiovascular: good peripheral perfusion, no pallor  Respiratory: breathing comfortably without use of accessory muscles  Gastrointestinal: nondistended  Genitourinary: no indwelling baker  Neurologic:  Mental status:  A&Ox4 (person, place, date, situation) answers questions appropriately follows commands  Speech: fluent, no aphasia or dysarthria     Tone: no spasticity noted  Psychiatric: appropriate affect  Hematologic/lymphatic/immunologic: no IV access  Skin: incision site visualized - with staples and well healed. Adjacent area with skin tear and some mild bleeding    Hospital Course by Problem List:  Patient participated in aggressive occupational and physical therapy and progressed as anticipated. Patient's primary barrier to rehabilitation included: uncontrolled pain, which significantly improved with naproxen and oxycodone as well as modalities    On the day of discharge, the patient was being treated for the following:   ##MSK  #Impaired ADLs and mobility  Patient is admitted to [facility] for comprehensive rehabilitation therapy as described.   Rehabilitation nursing monitors bowel and bladder control, educates on medication administration, co-morbidities and monitors patient safety.  Anticipated discharge date: 5/11/2022  Anticipated discharge destination: home with spouse  Prognosis: good  Equipment: walker with platform, wheelchair with cushion  Postdischarge therapies: TBD  Followup: orthopedic surgery (Dr. Cassius Zuniga MD), hematology   Code: full resuscitation     #Posttraumatic  pain, acute, improving  #Postsurgical pain, acute, improving  Continue lidocaine patch 5% one patch daily, naproxen 250mg BID PRN pain, oxycodone 2.5-5mg Q6hr PRN pain, tylenol 500mg PRN pain  Will discharge patient with norco 5/325mg PRN pain (14 tablets for 7 days)  Encourage ice to affected locations     #Acute impacted transcervical right femoral neck fracture s/p Open treatment with internal fixation of right femoral neck fracture on 4/29/2022 by Dr. Cassius Zuniga MD  #Acute right distal radial metaphyseal fracture s/p Closed treatment without manipulation  #old right healed pubic rami fractures with bone remodeling  toe-touch weightbearing of right lower limb and nonweight bearing of right wrist (ok for platform walker)      ##NEURO  Monitor     #RESP  Respiratory therapy: RT performs O2 management prn, breathing retraining, pulmonary hygiene and bronchospasm management prn to optimize participation in therapies.      ##CARDIAC  DVT prophylaxis:  Patient is on lovenox 40mg SC daily for anticoagulation upon transfer. Encourage OOB. Monitor daily for signs and symptoms of DVT including but not limited to swelling and pain to prevent the development of DVT that may interfere with therapies. On day of discharge, patient POD #12 so will not discharge patient with chemoprophylaxis.     ##GI  GI prophylaxis:  On prilosec 20mg daily to prevent gastritis/dyspepsia which may interfere with therapies.  #At risk of opioid induced constipation  Goal of one continent BM daily  Continue miralax 17g daily     #At risk for malnutrition  Prealbumin 14.3  Dietician monitors nutrient intake, recommend supplements prn and provide nutrition education to pt/family to promote optimal nutrition for wound healing/recovery.   Orders Placed This Encounter   Procedures   • Diet Order Diet: Regular       Standing Status:   Standing       Number of Occurrences:   1       Order Specific Question:   Diet:       Answer:   Regular [1]    Continue multivitamin daily     ##/RENAL  Continent and without retention     #Mild hyperphosphatemia  Monitor     ##HEME  #Pancytopenia  Vitamin B12 496  iron panel, ferritin, Vitamin D 25 OH wnl  Platelets mildly improving  Follow up with PCP  Monitor     ##SKIN  #Incision site  Routine wound care  - Removed staples 5/11    Skin/dermal ulcer prophylaxis: Monitor for new skin conditions with q.2 h. turns as required to prevent the development of skin breakdown.     Functional Status at Discharge  Eating:  Stand by Assist (set-up assist)  Eating Description:     Grooming:  Modified Independent,Standing  Grooming Description:   (Oral care standing at sink with platfrom walker)  Bathing:  Minimal Assist  Bathing Description:  Grab bar,Hand held shower,Tub bench,Assit with back,Assit wtih lower extremities,Assit with perineal,Increased time,Initial preparation for task,Set up for wound protection,Supervision for safety,Verbal cueing,Set-up of equipment (assist with feet seated on bench and buttock in standing)  Upper Body Dressing:  Minimal Assist  Upper Body Dressing Description:  Increased time,Initial preparation for task,Set-up of equipment,Supervision for safety (assist to turn bra around waist)  Lower Body Dressing:  Moderate Assist  Lower Body Dressing Description:  Grab bar,Assist with threading into pant leg,Increased time,Initial preparation for task,Set-up of equipment,Supervision for safety     Walk:  Standby Assist  Distance Walked:  300  Number of Times Distance Was Traveled:  3  Assistive Device:  Platform Walker  Gait Deviation:  Antalgic,Bradykinetic,Other (Comment) (as she fatigues, she increases WBing on RLE)  Wheelchair:  Stand by Assist  Distance Propelled:  20   Wheelchair Description:  Extra time,Leg rest management,Supervision for safety  Stairs Contact Guard Assist  Stairs Description Safety concerns,Verbal cueing,Walker (4inch step, climbs backwards for ascent better than hopping forward.  Is able to hop down while facing forwards)  Discharge Location: Home  Patient Discharging with Assist of: Spouse / Significant Other  Level of Supervision Required Upon Discharge: Intermittent Supervision  Recommended Equipment for Discharge: Platform Walker;Shower Chair;Grab Bars by Toilet;Reacher;Manual Wheelchair  Recommeded Services Upon Discharge: Home Health Physical Therapy  Long Term Goals Met: see treatment note 5/10/22  Long Term Goals Not Met: into/out of car (will assess afternoon 5/10/22)  Reason(s) for Goals Not Met: family training pending  Criteria for Termination of Services: Maximum Function Achieved for Inpatient Rehabilitation  Comprehension:  Independent  Comprehension Description:     Expression:  Independent  Expression Description:     Social Interaction:     Social Interaction Description:     Problem Solving:  Modified Independent  Problem Solving Description:     Memory:  Modified Independent  Memory Description:          Clement MCARTHUR D.O., personally performed a complete drug regimen review and no potential clinically significant medication issues were identified.   Discharge Medication:     Medication List      START taking these medications      Instructions   acetaminophen 500 MG Tabs  Commonly known as: TYLENOL   Take 1 Tablet by mouth every 6 hours as needed for Mild Pain.  Dose: 500 mg     lidocaine 5 % Ptch  Start taking on: May 11, 2022  Commonly known as: LIDODERM   Place 1 Patch on the skin every day.  Dose: 1 Patch     naproxen 250 MG Tabs  Commonly known as: NAPROSYN   Take 1 Tablet by mouth 2 times a day as needed (pain).  Dose: 250 mg     polyethylene glycol/lytes 17 g Pack  Commonly known as: MIRALAX   Take 1 Packet by mouth 1 time a day as needed (no BM in 24 hours).  Dose: 17 g        CHANGE how you take these medications      Instructions   HYDROcodone-acetaminophen 5-325 MG Tabs per tablet  What changed:   · how much to take  · when to take this  Commonly  known as: Norco   Doctor's comments: Trial tylenol and naproxen first.  Take 0.5-1 Tablets by mouth every 6 hours as needed (pain) for up to 7 days.  Dose: 0.5-1 Tablet        CONTINUE taking these medications      Instructions   Multivitamin Women 50+ Tabs   Take 1 Tablet by mouth every morning.  Dose: 1 Tablet     vitamin D3 1000 Unit (25 mcg) Tabs  Commonly known as: cholecalciferol   Take 1,000 Units by mouth every morning.  Dose: 1,000 Units        STOP taking these medications    enoxaparin 40 MG/0.4ML Sosy inj  Commonly known as: Lovenox     methocarbamol 750 MG Tabs  Commonly known as: ROBAXIN            Discharge Diet:  No restrictions on diet    Discharge Activity:  toe-touch weightbearing of right lower limb and nonweight bearing of right wrist (ok for platform walker)     Disposition:  Patient to discharge home with family support and community resources.     Equipment:  walker with platform, wheelchair with cushion    Follow-up & Discharge Instructions:  Follow up with your primary care provider (PCP) within 7-10 days of discharge to review your medications and take over your care.     If you develop chest pain, fever, chills, change in neurologic function (weakness, sensation changes, vision changes), or other concerning sxs, seek immediate medical attention or call 911.      Condition on Discharge:  Good    More than 35 minutes was spent on discharging this patient, including face-to-face time, prescription management, and the dictation of this note.    Clement Ugarte D.O.    Date of Service: 5/11/2022

## 2022-05-10 NOTE — CARE PLAN
Problem: Dressing  Goal: STG-Within one week, patient will dress UB with supervision  Outcome: Met  Goal: STG-Within one week, patient will dress LB with Min A using DME/AE as needed  Outcome: Met     Problem: Functional Transfers  Goal: STG-Within one week, patient will transfer to step in shower with Mod A using DME/AE as needed  Outcome: Met     Problem: OT Long Term Goals  Goal: LTG-By discharge, patient will complete basic self care tasks with supervision using DME/AE as needed  Outcome: Met  Goal: LTG-By discharge, patient will perform bathroom transfers with CGA using DME/AE as needed  Outcome: Met

## 2022-05-11 VITALS
OXYGEN SATURATION: 96 % | DIASTOLIC BLOOD PRESSURE: 57 MMHG | TEMPERATURE: 97.7 F | HEART RATE: 60 BPM | WEIGHT: 185.19 LBS | SYSTOLIC BLOOD PRESSURE: 120 MMHG | RESPIRATION RATE: 18 BRPM | BODY MASS INDEX: 29.76 KG/M2 | HEIGHT: 66 IN

## 2022-05-11 PROCEDURE — A9270 NON-COVERED ITEM OR SERVICE: HCPCS | Performed by: PHYSICAL MEDICINE & REHABILITATION

## 2022-05-11 PROCEDURE — 700102 HCHG RX REV CODE 250 W/ 637 OVERRIDE(OP): Performed by: PHYSICAL MEDICINE & REHABILITATION

## 2022-05-11 PROCEDURE — 700101 HCHG RX REV CODE 250: Performed by: PHYSICAL MEDICINE & REHABILITATION

## 2022-05-11 PROCEDURE — 700111 HCHG RX REV CODE 636 W/ 250 OVERRIDE (IP): Performed by: PHYSICAL MEDICINE & REHABILITATION

## 2022-05-11 PROCEDURE — 99239 HOSP IP/OBS DSCHRG MGMT >30: CPT | Performed by: PHYSICAL MEDICINE & REHABILITATION

## 2022-05-11 RX ORDER — NAPROXEN 250 MG/1
250 TABLET ORAL 2 TIMES DAILY PRN
Qty: 14 TABLET | Refills: 0 | Status: SHIPPED | OUTPATIENT
Start: 2022-05-11 | End: 2022-06-16

## 2022-05-11 RX ORDER — POLYETHYLENE GLYCOL 3350 17 G/17G
17 POWDER, FOR SOLUTION ORAL
Qty: 10 EACH | Refills: 1 | Status: SHIPPED | OUTPATIENT
Start: 2022-05-11 | End: 2022-06-16

## 2022-05-11 RX ORDER — HYDROCODONE BITARTRATE AND ACETAMINOPHEN 5; 325 MG/1; MG/1
.5-1 TABLET ORAL EVERY 6 HOURS PRN
Qty: 14 TABLET | Refills: 0 | Status: SHIPPED | OUTPATIENT
Start: 2022-05-11 | End: 2022-05-18

## 2022-05-11 RX ORDER — ACETAMINOPHEN 500 MG
500 TABLET ORAL EVERY 6 HOURS PRN
Qty: 30 TABLET | Refills: 2 | Status: SHIPPED | OUTPATIENT
Start: 2022-05-11 | End: 2023-01-26

## 2022-05-11 RX ORDER — LIDOCAINE 50 MG/G
1 PATCH TOPICAL DAILY
Qty: 10 PATCH | Refills: 1 | Status: SHIPPED | OUTPATIENT
Start: 2022-05-11 | End: 2022-07-14

## 2022-05-11 RX ADMIN — OXYCODONE 2.5 MG: 5 TABLET ORAL at 10:55

## 2022-05-11 RX ADMIN — OMEPRAZOLE 20 MG: 20 CAPSULE, DELAYED RELEASE ORAL at 09:05

## 2022-05-11 RX ADMIN — OXYCODONE 2.5 MG: 5 TABLET ORAL at 03:46

## 2022-05-11 RX ADMIN — LIDOCAINE 1 PATCH: 50 PATCH TOPICAL at 09:07

## 2022-05-11 RX ADMIN — ENOXAPARIN SODIUM 40 MG: 40 INJECTION SUBCUTANEOUS at 09:07

## 2022-05-11 RX ADMIN — ACETAMINOPHEN 500 MG: 500 TABLET, FILM COATED ORAL at 09:05

## 2022-05-11 RX ADMIN — MULTIPLE VITAMINS W/ MINERALS TAB 1 TABLET: TAB at 10:55

## 2022-05-11 ASSESSMENT — PAIN SCALES - PAIN ASSESSMENT IN ADVANCED DEMENTIA (PAINAD): BODYLANGUAGE: RELAXED

## 2022-05-11 ASSESSMENT — PAIN DESCRIPTION - PAIN TYPE: TYPE: ACUTE PAIN

## 2022-05-11 NOTE — PROGRESS NOTES
This patient, and spouse with patient permission, received individualized medication counseling regarding the current regimen they are receiving in this facility. Potential adverse effects, monitoring parameters, and proper administration were covered in preparation for their discharge.The patient will likely be taking pain medications upon discharge. The patient was warned regarding sedation, impairment, and constipation as side effects to such regimens. Further warning regarding operating motor vehicles, or dangerous equipment was delivered as well. The patient asked relevant questions regarding the medications for which answers were provided. Our pharmacy hours and phone number were provided for follow up questions should they arise.  Ehsan Thakkar  [facility]

## 2022-05-11 NOTE — CARE PLAN
Right arm splint ace wrap changed.  Problem: Pain - Standard  Goal: Alleviation of pain or a reduction in pain to the patient’s comfort goal  Outcome: Progressing  Note: Assessed for pain and discomfort , denies pain,needs anticipated and attended.     Problem: Fall Risk - Rehab  Goal: Patient will remain free from falls  Outcome: Progressing  Note: Em Salcido Fall risk Assessment Score: 9    Low fall risk interventions   - Call light within reach   - Yellow  socks   - Belongings within reach   - Bed in the lowest position        Problem: Skin Integrity  Goal: Patient's skin integrity will be maintained or improve  Outcome: Progressing  Note: Right hip incision line with staples well approximated , with Island dressing in place, denies pain,encouraged to request pain med as needed. Pt on tylenol ATC. Pain under control.     Problem: Psychosocial  Goal: Patient's level of anxiety will decrease  Outcome: Progressing  Note: Pt calm and cooperative, excited to go home in am.   The patient is Stable - Low risk of patient condition declining or worsening    Shift Goals  Clinical Goals: pain control  Patient Goals: sleep well    Progress made toward(s) clinical / shift goals:  Pt pain level under control, able to sleep.

## 2022-05-11 NOTE — DISCHARGE PLANNING
CM   All cm reviewed w/ pt and spouse Anthony.   Tc to [facility]; they confirm they are not contracted with pt's  insurance co.   Pt reports she gave incorrect pharmacy information to MD.  Correct pharmacy is [facility] [place].  I voalted Dr Ugarte informing her of above and requested  meds be sent to [facility] [address].      Disucssed /reviewed IMM; pt and souse in agreement w/ dc.     Plan:  Dc home today.

## 2022-05-11 NOTE — PROGRESS NOTES
Patient discharged to home per order.  Discharge instructions reviewed with patient and spouse; they verbalize understanding and signed copies placed in chart.  Patient has all belongings; signed copy of form in chart.  Patient left facility at 1100 via platform walker accompanied by rehab staff and spouse.  Have enjoyed working with this pleasant patient.

## 2022-05-13 ENCOUNTER — TELEPHONE (OUTPATIENT)
Dept: SCHEDULING | Facility: IMAGING CENTER | Age: 72
End: 2022-05-13

## 2022-05-13 NOTE — DOCUMENTATION QUERY
[facility]                                                                       Query Response Note      PATIENT:               NATE BURRELL  ACCT #:                  [id number]  MRN:                     [id number]  :                      1950  ADMIT DATE:       2022 5:21 PM  DISCH DATE:        5/3/2022 11:17 AM  RESPONDING  PROVIDER #:        [id number]           QUERY TEXT:    Chronic diabetes is documented in the Medical Record.  Please specify the type:     NOTE:  If an appropriate response is not listed below, please respond with a new note.    The patient's Clinical Indicators include:  Documentation in discharge summary has she was also noted to have pancytopenia, on further questioning diabetes appears to be chronic.    Treatment and monitoring:  No treatment    Related conditions impacting treatment or care;  pancytopenia, thrombocytopnia    Jennifer [email or web address]  Options provided:   -- Type I diabetes mellitus   -- Type II diabetes mellitus   -- History of diabetes   -- Unable to determine      Query created by: Jennifer Chong on 2022 9:05 AM    RESPONSE TEXT:    Unable to determine          Electronically signed by:  LINH COON MD 2022 2:03 PM

## 2022-06-15 PROBLEM — Z00.00 PREVENTATIVE HEALTH CARE: Status: ACTIVE | Noted: 2022-06-15

## 2022-06-16 ENCOUNTER — OFFICE VISIT (OUTPATIENT)
Dept: MEDICAL GROUP | Facility: MEDICAL CENTER | Age: 72
End: 2022-06-16
Payer: MEDICARE

## 2022-06-16 VITALS
DIASTOLIC BLOOD PRESSURE: 72 MMHG | HEIGHT: 67 IN | TEMPERATURE: 98 F | HEART RATE: 56 BPM | OXYGEN SATURATION: 96 % | WEIGHT: 188.93 LBS | BODY MASS INDEX: 29.65 KG/M2 | SYSTOLIC BLOOD PRESSURE: 128 MMHG

## 2022-06-16 DIAGNOSIS — D61.818 PANCYTOPENIA (HCC): ICD-10-CM

## 2022-06-16 DIAGNOSIS — E77.8 HYPOPROTEINEMIA (HCC): ICD-10-CM

## 2022-06-16 DIAGNOSIS — S52.501A CLOSED FRACTURE OF DISTAL END OF RIGHT RADIUS, UNSPECIFIED FRACTURE MORPHOLOGY, INITIAL ENCOUNTER: ICD-10-CM

## 2022-06-16 DIAGNOSIS — S72.001D CLOSED FRACTURE OF NECK OF RIGHT FEMUR WITH ROUTINE HEALING, SUBSEQUENT ENCOUNTER: ICD-10-CM

## 2022-06-16 DIAGNOSIS — Z12.31 ENCOUNTER FOR SCREENING MAMMOGRAM FOR BREAST CANCER: ICD-10-CM

## 2022-06-16 DIAGNOSIS — Z00.00 PREVENTATIVE HEALTH CARE: ICD-10-CM

## 2022-06-16 PROCEDURE — 99204 OFFICE O/P NEW MOD 45 MIN: CPT | Performed by: INTERNAL MEDICINE

## 2022-06-16 ASSESSMENT — ENCOUNTER SYMPTOMS
NEUROLOGICAL NEGATIVE: 1
GASTROINTESTINAL NEGATIVE: 1
MUSCULOSKELETAL NEGATIVE: 1
CARDIOVASCULAR NEGATIVE: 1
PSYCHIATRIC NEGATIVE: 1
CONSTITUTIONAL NEGATIVE: 1
RESPIRATORY NEGATIVE: 1

## 2022-06-16 ASSESSMENT — FIBROSIS 4 INDEX: FIB4 SCORE: 5.1

## 2022-06-16 NOTE — ASSESSMENT & PLAN NOTE
S/p ORIF.  Following with orthopedic surgeon, PT/OT, home health.  Finish course of naproxen, using Tylenol as needed.

## 2022-06-16 NOTE — PROGRESS NOTES
Subjective:     Chief Complaint   Patient presents with   • Establish Care   • Fall   • Requesting Labs      Diagnoses of Preventative health care, Encounter for screening mammogram for breast cancer, Pancytopenia (HCC), Hypoproteinemia (HCC), Closed fracture of distal end of right radius, unspecified fracture morphology, initial encounter, and Closed fracture of neck of right femur with routine healing, subsequent encounter were pertinent to this visit.    HISTORY OF THE PRESENT ILLNESS: Patient is a 72 y.o. female. This pleasant patient is here today to establish care with her  Anthony. Her prior PCP was Dr Espinosa.     Problem   Preventative Health Care    Immunizations: Requested from PCP.  Mammogram: Requested from PCP.  Colorectal screening: ~2017 in [place], patient will upload records through [employer].  Pap smear: Aged out  DEXA: Requested from PCP.         Pancytopenia (Hcc)    Patient has history of thrombocytopenia, followed by Dr. Casey.  She has appointment with him on Monday.  After surgery, patient has developed anemia and leukopenia.  Most likely due to blood loss.    Lab Results   Component Value Date/Time    WBC 3.3 (L) 05/09/2022 05:26 AM    RBC 2.92 (L) 05/09/2022 05:26 AM    HEMOGLOBIN 9.3 (L) 05/09/2022 05:26 AM    HEMATOCRIT 28.6 (L) 05/09/2022 05:26 AM    MCV 97.9 (H) 05/09/2022 05:26 AM    MCH 31.8 05/09/2022 05:26 AM    MCHC 32.5 (L) 05/09/2022 05:26 AM    MPV 9.9 05/09/2022 05:26 AM           Closed Fracture of Distal End of Right Radius    Status post fall, nonoperative.  Following with Ortho, home health PT OT.       Fracture of Femoral Neck, Right, Closed (Hcc)    Status post ORIF, closed treatment without manipulation.  PT/OT       Past Medical History:   Diagnosis Date   • Patient denies medical problems    • Thrombocytopenia (HCC)      Past Surgical History:   Procedure Laterality Date   • ORIF, HIP Right 04/29/2022    Procedure: ORIF, HIP, SPLINTING RIGHT WRIST;  Surgeon: Cassius  "MARIANA Zuniga M.D.;  Location: SURGERY [facility];  Service: Orthopedics   • TONSILLECTOMY       Family History   Problem Relation Age of Onset   • Cancer Mother         Non-small cell carcinoma   • Rheumatologic Disease Mother    • Heart Disease Father    • Cancer Father         MALT   • Heart Disease Sister    • Cancer Sister         neuroendocrine Ca   • Cancer Paternal Uncle    • Diabetes Neg Hx    • Breast Cancer Neg Hx      Social History     Tobacco Use   • Smoking status: Former Smoker     Packs/day: 0.25     Years: 20.00     Pack years: 5.00     Quit date: 2013     Years since quittin.0   • Smokeless tobacco: Never Used   Substance Use Topics   • Alcohol use: Yes     Comment: occasional   • Drug use: Not Currently     Current Outpatient Medications Ordered in Epic   Medication Sig Dispense Refill   • acetaminophen (TYLENOL) 500 MG Tab Take 1 Tablet by mouth as needed in the morning and 1 Tablet as needed at noon and 1 Tablet as needed in the evening and 1 Tablet as needed before bedtime for Mild Pain or Moderate Pain. 30 Tablet 2   • lidocaine (LIDODERM) 5 % Patch Place 1 Patch on the skin every day. 10 Patch 1   • Multiple Vitamins-Minerals (MULTIVITAMIN WOMEN 50+) Tab Take 1 Tablet by mouth every morning.     • vitamin D3 (CHOLECALCIFEROL) 1000 Unit (25 mcg) Tab Take 1,000 Units by mouth every morning. (Patient not taking: Reported on 2022)       No current [facility]-ordered facility-administered medications on file.     Health Maintenance: HPI     Review of Systems   Constitutional: Negative.    HENT: Negative.    Respiratory: Negative.    Cardiovascular: Negative.    Gastrointestinal: Negative.    Genitourinary: Negative.    Musculoskeletal: Negative.    Neurological: Negative.    Psychiatric/Behavioral: Negative.      Objective:     Exam: /72 (BP Location: Right arm, Patient Position: Sitting, BP Cuff Size: Adult)   Pulse (!) 56   Temp 36.7 °C (98 °F) (Temporal)   Ht 1.702 m (5' 7\")   " Wt 85.7 kg (188 lb 15 oz)   SpO2 96%  Body mass index is 29.59 kg/m².    Physical Exam  Constitutional:       Comments: overweight   HENT:      Head: Normocephalic and atraumatic.      Nose: Nose normal. No congestion or rhinorrhea.      Mouth/Throat:      Mouth: Mucous membranes are moist.      Pharynx: Oropharynx is clear. No oropharyngeal exudate or posterior oropharyngeal erythema.   Eyes:      General: No scleral icterus.  Cardiovascular:      Rate and Rhythm: Normal rate and regular rhythm.      Pulses: Normal pulses.      Heart sounds: Normal heart sounds. No murmur heard.    No gallop.   Pulmonary:      Effort: Pulmonary effort is normal. No respiratory distress.      Breath sounds: Normal breath sounds. No wheezing or rales.   Musculoskeletal:      Comments: Wearing right wrist brace   Skin:     General: Skin is warm and dry.   Neurological:      General: No focal deficit present.      Mental Status: She is alert and oriented to person, place, and time.      Cranial Nerves: No cranial nerve deficit.      Motor: No weakness.      Gait: Gait normal.   Psychiatric:         Mood and Affect: Mood normal.         Behavior: Behavior normal.         Thought Content: Thought content normal.         Judgment: Judgment normal.       Labs: I have reviewed CBC, BMP from 5/8/2022    Assessment & Plan:   72 y.o. female with the following -    Problem List Items Addressed This Visit     Closed fracture of distal end of right radius     Status post fall, nonoperative.  Following with orthopedic surgeon, home health PT/OT.  Her pain was controlled with naproxen, lidocaine and Tylenol as needed.  Only using occasionally now.           Fracture of femoral neck, right, closed (HCC)     S/p ORIF.  Following with orthopedic surgeon, PT/OT, home health.  Finish course of naproxen, using Tylenol as needed.           Pancytopenia (HCC)     Previous history of thrombocytopenia, now anemia and leukopenia.  Most recent CBC as  above.  Following up with Dr. Casey next week.           Relevant Orders    Referral to Hematology Oncology    CBC WITH DIFFERENTIAL    Preventative health care     See HPI             Other Visit Diagnoses     Encounter for screening mammogram for breast cancer        Relevant Orders    MA-SCREENING MAMMO BILAT W/TOMOSYNTHESIS W/CAD    Hypoproteinemia (HCC)        Relevant Orders    Comp Metabolic Panel        Return in about 4 weeks (around 7/14/2022), or if symptoms worsen or fail to improve.    Please note that this dictation was created using voice recognition software. I have made every reasonable attempt to correct obvious errors, but I expect that there are errors of grammar and possibly content that I did not discover before finalizing the note.

## 2022-06-16 NOTE — LETTER
[employer] [facility]  Cami Carrington M.D.  [address]  [place] [address]  Fax: [phone]   Authorization for Release/Disclosure of   Protected Health Information   Name: CARMITA MILLER : 1950 SSN: [id number]   Address: [address] [place] [address] [place] [address] Phone:    [phone] (home)    I authorize the entity listed below to release/disclose the PHI below to:   [facility]/Cami Carrington M.D. and Cami Carrington M.D.   Provider or Entity Name:  Dr. Nadia Hammonds   Address   [facility], [facility]   Phone:      Fax:     Reason for request: continuity of care   Information to be released:    [  ] LAST COLONOSCOPY,  including any PATH REPORT and follow-up  [  ] LAST FIT/COLOGUARD RESULT [  ] LAST DEXA  [  ] LAST MAMMOGRAM  [  ] LAST PAP  [  ] LAST LABS [  ] RETINA EXAM REPORT  [  ] IMMUNIZATION RECORDS  [  ] Release all info      [  ] Check here and initial the line next to each item to release ALL health information INCLUDING  _____ Care and treatment for drug and / or alcohol abuse  _____ HIV testing, infection status, or AIDS  _____ Genetic Testing    DATES OF SERVICE OR TIME PERIOD TO BE DISCLOSED: _____________  I understand and acknowledge that:  * This Authorization may be revoked at any time by you in writing, except if your health information has already been used or disclosed.  * Your health information that will be used or disclosed as a result of you signing this authorization could be re-disclosed by the recipient. If this occurs, your re-disclosed health information may no longer be protected by State or Federal laws.  * You may refuse to sign this Authorization. Your refusal will not affect your ability to obtain treatment.  * This Authorization becomes effective upon signing and will  on (date) __________.      If no date is indicated, this Authorization will  one (1) year from the signature date.    Name: Carmita Miller    Signature:   Date:      6/16/2022       PLEASE FAX REQUESTED RECORDS BACK TO: [phone]

## 2022-06-16 NOTE — ASSESSMENT & PLAN NOTE
Previous history of thrombocytopenia, now anemia and leukopenia.  Most recent CBC as above.  Following up with Dr. Casey next week.

## 2022-06-16 NOTE — LETTER
[employer] [facility]  Cami Carrington M.D.  [address]  [place] [address]  Fax: [phone]   Authorization for Release/Disclosure of   Protected Health Information   Name: CARMITA MILLER : 1950 SSN: [id number]   Address: [address] [place], [place] [address] [place] [address] Phone:    [phone] (home)    I authorize the entity listed below to release/disclose the PHI below to:   [facility]/Cami Carrington M.D. and Cami Carrington M.D.   Provider or Entity Name:  Mago Espinosa MD   Address   City, State, [facility]   Phone:      Fax:     Reason for request: continuity of care   Information to be released:    [x  ] LAST COLONOSCOPY,  including any PATH REPORT and follow-up  [  ] LAST FIT/COLOGUARD RESULT [  ] LAST DEXA  [ x ] LAST MAMMOGRAM  [  ] LAST PAP  [ x ] LAST LABS [  ] RETINA EXAM REPORT  [  ] IMMUNIZATION RECORDS  [ x ] Release all info      [  ] Check here and initial the line next to each item to release ALL health information INCLUDING  _____ Care and treatment for drug and / or alcohol abuse  _____ HIV testing, infection status, or AIDS  _____ Genetic Testing    DATES OF SERVICE OR TIME PERIOD TO BE DISCLOSED: _____________  I understand and acknowledge that:  * This Authorization may be revoked at any time by you in writing, except if your health information has already been used or disclosed.  * Your health information that will be used or disclosed as a result of you signing this authorization could be re-disclosed by the recipient. If this occurs, your re-disclosed health information may no longer be protected by State or Federal laws.  * You may refuse to sign this Authorization. Your refusal will not affect your ability to obtain treatment.  * This Authorization becomes effective upon signing and will  on (date) __________.      If no date is indicated, this Authorization will  one (1) year from the signature date.    Name: Carmita Miller    Signature:    Date:     6/16/2022       PLEASE FAX REQUESTED RECORDS BACK TO: [phone]

## 2022-06-16 NOTE — ASSESSMENT & PLAN NOTE
Status post fall, nonoperative.  Following with orthopedic surgeon, home health PT/OT.  Her pain was controlled with naproxen, lidocaine and Tylenol as needed.  Only using occasionally now.

## 2022-06-16 NOTE — LETTER
[facility]  Cami Carrington M.D.  [address]  [place] [address]  Fax: [phone]   Authorization for Release/Disclosure of   Protected Health Information   Name: NATE MILLER : 1950 SSN: [id number]   Address: [address] [place] [address] [place] [address] Phone:    [phone] (home)    I authorize the entity listed below to release/disclose the PHI below to:   [facility]/Cami Carrington M.D. and Cami Carrington M.D.   Provider or Entity Name:  Dr Casey    Address   [facility], [facility], [facility]   Phone:      Fax:     Reason for request: continuity of care   Information to be released:    [  ] LAST COLONOSCOPY,  including any PATH REPORT and follow-up  [  ] LAST FIT/COLOGUARD RESULT [  ] LAST DEXA  [  ] LAST MAMMOGRAM  [  ] LAST PAP  [  ] LAST LABS [  ] RETINA EXAM REPORT  [  ] IMMUNIZATION RECORDS  [  ] Release all info      [  ] Check here and initial the line next to each item to release ALL health information INCLUDING  _____ Care and treatment for drug and / or alcohol abuse  _____ HIV testing, infection status, or AIDS  _____ Genetic Testing    DATES OF SERVICE OR TIME PERIOD TO BE DISCLOSED: _____________  I understand and acknowledge that:  * This Authorization may be revoked at any time by you in writing, except if your health information has already been used or disclosed.  * Your health information that will be used or disclosed as a result of you signing this authorization could be re-disclosed by the recipient. If this occurs, your re-disclosed health information may no longer be protected by State or Federal laws.  * You may refuse to sign this Authorization. Your refusal will not affect your ability to obtain treatment.  * This Authorization becomes effective upon signing and will  on (date) __________.      If no date is indicated, this Authorization will  one (1) year from the signature date.    Name: Nate Miller    Signature:   Date:      6/16/2022       PLEASE FAX REQUESTED RECORDS BACK TO: [phone]

## 2022-06-29 ENCOUNTER — HOSPITAL ENCOUNTER (OUTPATIENT)
Dept: RADIOLOGY | Facility: MEDICAL CENTER | Age: 72
End: 2022-06-29
Payer: MEDICARE

## 2022-07-12 ENCOUNTER — HOSPITAL ENCOUNTER (OUTPATIENT)
Dept: LAB | Facility: MEDICAL CENTER | Age: 72
End: 2022-07-12
Attending: INTERNAL MEDICINE
Payer: MEDICARE

## 2022-07-12 DIAGNOSIS — D61.818 PANCYTOPENIA (HCC): ICD-10-CM

## 2022-07-12 DIAGNOSIS — E77.8 HYPOPROTEINEMIA (HCC): ICD-10-CM

## 2022-07-12 LAB
ALBUMIN SERPL BCP-MCNC: 4.4 G/DL (ref 3.2–4.9)
ALBUMIN/GLOB SERPL: 1.9 G/DL
ALP SERPL-CCNC: 81 U/L (ref 30–99)
ALT SERPL-CCNC: 15 U/L (ref 2–50)
ANION GAP SERPL CALC-SCNC: 8 MMOL/L (ref 7–16)
AST SERPL-CCNC: 16 U/L (ref 12–45)
BASOPHILS # BLD AUTO: 0.4 % (ref 0–1.8)
BASOPHILS # BLD: 0.02 K/UL (ref 0–0.12)
BILIRUB SERPL-MCNC: 0.4 MG/DL (ref 0.1–1.5)
BUN SERPL-MCNC: 15 MG/DL (ref 8–22)
CALCIUM SERPL-MCNC: 9.4 MG/DL (ref 8.5–10.5)
CHLORIDE SERPL-SCNC: 101 MMOL/L (ref 96–112)
CO2 SERPL-SCNC: 24 MMOL/L (ref 20–33)
CREAT SERPL-MCNC: 0.66 MG/DL (ref 0.5–1.4)
EOSINOPHIL # BLD AUTO: 0.07 K/UL (ref 0–0.51)
EOSINOPHIL NFR BLD: 1.5 % (ref 0–6.9)
ERYTHROCYTE [DISTWIDTH] IN BLOOD BY AUTOMATED COUNT: 47.1 FL (ref 35.9–50)
GFR SERPLBLD CREATININE-BSD FMLA CKD-EPI: 94 ML/MIN/1.73 M 2
GLOBULIN SER CALC-MCNC: 2.3 G/DL (ref 1.9–3.5)
GLUCOSE SERPL-MCNC: 82 MG/DL (ref 65–99)
HCT VFR BLD AUTO: 38.8 % (ref 37–47)
HGB BLD-MCNC: 12.9 G/DL (ref 12–16)
IMM GRANULOCYTES # BLD AUTO: 0.02 K/UL (ref 0–0.11)
IMM GRANULOCYTES NFR BLD AUTO: 0.4 % (ref 0–0.9)
LYMPHOCYTES # BLD AUTO: 1.91 K/UL (ref 1–4.8)
LYMPHOCYTES NFR BLD: 40 % (ref 22–41)
MCH RBC QN AUTO: 31.8 PG (ref 27–33)
MCHC RBC AUTO-ENTMCNC: 33.2 G/DL (ref 33.6–35)
MCV RBC AUTO: 95.6 FL (ref 81.4–97.8)
MONOCYTES # BLD AUTO: 0.6 K/UL (ref 0–0.85)
MONOCYTES NFR BLD AUTO: 12.6 % (ref 0–13.4)
NEUTROPHILS # BLD AUTO: 2.15 K/UL (ref 2–7.15)
NEUTROPHILS NFR BLD: 45.1 % (ref 44–72)
NRBC # BLD AUTO: 0 K/UL
NRBC BLD-RTO: 0 /100 WBC
PLATELET # BLD AUTO: 94 K/UL (ref 164–446)
PMV BLD AUTO: 10.1 FL (ref 9–12.9)
POTASSIUM SERPL-SCNC: 4.4 MMOL/L (ref 3.6–5.5)
PROT SERPL-MCNC: 6.7 G/DL (ref 6–8.2)
RBC # BLD AUTO: 4.06 M/UL (ref 4.2–5.4)
SODIUM SERPL-SCNC: 133 MMOL/L (ref 135–145)
WBC # BLD AUTO: 4.8 K/UL (ref 4.8–10.8)

## 2022-07-12 PROCEDURE — 80053 COMPREHEN METABOLIC PANEL: CPT

## 2022-07-12 PROCEDURE — 85025 COMPLETE CBC W/AUTO DIFF WBC: CPT

## 2022-07-12 PROCEDURE — 36415 COLL VENOUS BLD VENIPUNCTURE: CPT

## 2022-07-14 ENCOUNTER — OFFICE VISIT (OUTPATIENT)
Dept: MEDICAL GROUP | Facility: MEDICAL CENTER | Age: 72
End: 2022-07-14
Payer: MEDICARE

## 2022-07-14 VITALS
DIASTOLIC BLOOD PRESSURE: 66 MMHG | WEIGHT: 189.82 LBS | BODY MASS INDEX: 29.79 KG/M2 | HEART RATE: 55 BPM | TEMPERATURE: 97.7 F | OXYGEN SATURATION: 96 % | HEIGHT: 67 IN | SYSTOLIC BLOOD PRESSURE: 116 MMHG

## 2022-07-14 DIAGNOSIS — H44.811 EYE HEMORRHAGE, RIGHT: ICD-10-CM

## 2022-07-14 DIAGNOSIS — Z78.0 POSTMENOPAUSE: ICD-10-CM

## 2022-07-14 DIAGNOSIS — D69.6 THROMBOCYTOPENIA (HCC): ICD-10-CM

## 2022-07-14 DIAGNOSIS — S52.501A CLOSED FRACTURE OF DISTAL END OF RIGHT RADIUS, UNSPECIFIED FRACTURE MORPHOLOGY, INITIAL ENCOUNTER: ICD-10-CM

## 2022-07-14 DIAGNOSIS — R63.5 WEIGHT GAIN: ICD-10-CM

## 2022-07-14 DIAGNOSIS — M85.80 OSTEOPENIA, UNSPECIFIED LOCATION: ICD-10-CM

## 2022-07-14 DIAGNOSIS — S72.001D CLOSED FRACTURE OF NECK OF RIGHT FEMUR WITH ROUTINE HEALING, SUBSEQUENT ENCOUNTER: ICD-10-CM

## 2022-07-14 DIAGNOSIS — Z12.11 COLON CANCER SCREENING: ICD-10-CM

## 2022-07-14 PROCEDURE — 99214 OFFICE O/P EST MOD 30 MIN: CPT | Performed by: INTERNAL MEDICINE

## 2022-07-14 ASSESSMENT — FIBROSIS 4 INDEX: FIB4 SCORE: 3.08

## 2022-07-14 ASSESSMENT — ENCOUNTER SYMPTOMS
CARDIOVASCULAR NEGATIVE: 1
PSYCHIATRIC NEGATIVE: 1
CONSTITUTIONAL NEGATIVE: 1
NEUROLOGICAL NEGATIVE: 1
GASTROINTESTINAL NEGATIVE: 1
RESPIRATORY NEGATIVE: 1

## 2022-07-14 NOTE — ASSESSMENT & PLAN NOTE
This is a new problem, patient seen her ophthalmologist and was told she had a small hemorrhage in her right eye.

## 2022-07-14 NOTE — PROGRESS NOTES
Subjective:     Chief Complaint   Patient presents with   • Follow-Up     Diagnoses of Thrombocytopenia (HCC), Closed fracture of distal end of right radius, unspecified fracture morphology, initial encounter, Osteopenia, unspecified location, Colon cancer screening, Postmenopause, Eye hemorrhage, right, Weight gain, and Closed fracture of neck of right femur with routine healing, subsequent encounter were pertinent to this visit.    HPI: Carmita is a pleasant 72 y.o. female who presents today with  Anthony for follow-up and update     Problem   Eye Hemorrhage, Right    Patient seen her ophthalmologist and she was told that she had hemorrhage of the right eye.  Not sure about the details, but recommended to follow-up with Ophthalmology      Osteopenia    Vitamin D and calcium 1200 mg from all sources.     Thrombocytopenia (Hcc)    Patient has history of thrombocytopenia, followed by Dr. Casey.  She has appointment with him on Monday.  During the hospital stay, patient has developed anemia and leukopenia, that have now resolved.  Platelet count has improved.    Lab Results   Component Value Date/Time    WBC 4.8 07/12/2022 08:35 AM    RBC 4.06 (L) 07/12/2022 08:35 AM    HEMOGLOBIN 12.9 07/12/2022 08:35 AM    HEMATOCRIT 38.8 07/12/2022 08:35 AM    MCV 95.6 07/12/2022 08:35 AM    MCH 31.8 07/12/2022 08:35 AM    MCHC 33.2 (L) 07/12/2022 08:35 AM    MPV 10.1 07/12/2022 08:35 AM   Dr. Middleton notes reviewed: Mild thrombocytopenia, most likely immune thrombocytopenia.  We will follow-up with hematology once a year.         Closed Fracture of Distal End of Right Radius   Fracture of Femoral Neck, Right, Closed (Hcc)    Status post ORIF, closed treatment without manipulation.  Doing physical therapy few times a week.  Ambulating with cane.  Pain is controlled, occasional Tylenol as needed.     Hip Fracture Requiring Operative Repair, Right, Closed, With Routine Healing, Subsequent Encounter (Resolved)       Past Medical  "History:   Diagnosis Date   • Hip fracture requiring operative repair, right, closed, with routine healing, subsequent encounter 5/3/2022   • Patient denies medical problems    • Thrombocytopenia (HCC)        Current Outpatient Medications Ordered in Epic   Medication Sig Dispense Refill   • CALCIUM-VITAMIN D PO      • acetaminophen (TYLENOL) 500 MG Tab Take 1 Tablet by mouth as needed in the morning and 1 Tablet as needed at noon and 1 Tablet as needed in the evening and 1 Tablet as needed before bedtime for Mild Pain or Moderate Pain. 30 Tablet 2   • Multiple Vitamins-Minerals (MULTIVITAMIN WOMEN 50+) Tab Take 1 Tablet by mouth every morning.     • vitamin D3 (CHOLECALCIFEROL) 1000 Unit (25 mcg) Tab Take 1,000 Units by mouth every morning.       No current [facility]-ordered facility-administered medications on file.     Health Maintenance: Due for colorectal cancer screening-she does have history of polyps, however she is still recovering from her hip fracture and she would like to hold off colonoscopy at this time.  Will obtain Cologuard.  Mammogram is scheduled, patient is also due for DEXA scan due to previous showed osteopenia.    Review of Systems   Constitutional: Negative.    HENT: Negative.    Respiratory: Negative.    Cardiovascular: Negative.    Gastrointestinal: Negative.    Genitourinary: Negative.    Musculoskeletal: Positive for joint pain (hip).   Neurological: Negative.    Psychiatric/Behavioral: Negative.      Objective:     Exam:  /66 (BP Location: Left arm, Patient Position: Sitting, BP Cuff Size: Adult)   Pulse (!) 55   Temp 36.5 °C (97.7 °F) (Temporal)   Ht 1.702 m (5' 7\")   Wt 86.1 kg (189 lb 13.1 oz)   SpO2 96%   BMI 29.73 kg/m²  Body mass index is 29.73 kg/m².    Physical Exam  Constitutional:       Comments: overweight   HENT:      Head: Normocephalic and atraumatic.      Nose: Nose normal. No congestion or rhinorrhea.      Mouth/Throat:      Mouth: Mucous membranes are moist.      " Pharynx: Oropharynx is clear. No oropharyngeal exudate or posterior oropharyngeal erythema.   Eyes:      General: No scleral icterus.  Cardiovascular:      Rate and Rhythm: Normal rate and regular rhythm.      Pulses: Normal pulses.      Heart sounds: Normal heart sounds. No murmur heard.    No gallop.   Pulmonary:      Effort: Pulmonary effort is normal. No respiratory distress.      Breath sounds: Normal breath sounds. No wheezing or rales.   Musculoskeletal:      Comments: Wearing right wrist brace   Skin:     General: Skin is warm and dry.   Neurological:      General: No focal deficit present.      Mental Status: She is alert and oriented to person, place, and time.      Cranial Nerves: No cranial nerve deficit.      Motor: No weakness.      Gait: Gait normal.   Psychiatric:         Mood and Affect: Mood normal.         Behavior: Behavior normal.         Thought Content: Thought content normal.         Judgment: Judgment normal.       Labs: Reviewed CBC, CMP from 7/12/2022    Assessment & Plan:   Carmita  is a pleasant 72 y.o. female with the following -     Problem List Items Addressed This Visit     Osteopenia (Chronic)     Continue vitamin D and calcium.  Obtain bone density scan.         Thrombocytopenia (HCC) (Chronic)     Chronic, stable likely immune thrombocytopenia, will follow with Dr. Casey once a year.              Eye hemorrhage, right     This is a new problem, patient seen her ophthalmologist and was told she had a small hemorrhage in her right eye.         Fracture of femoral neck, right, closed (HCC)     Status post ORIF.  Pain is controlled, continue PT OT.           Other Visit Diagnoses     Colon cancer screening        Relevant Orders    COLOGUARD (FIT DNA)    Postmenopause        Relevant Orders    DS-BONE DENSITY STUDY (DEXA)    Weight gain        Relevant Orders    TSH WITH REFLEX TO FT4        Return in about 6 months (around 1/14/2023), or if symptoms worsen or fail to  improve.    Please note that this dictation was created using voice recognition software. I have made every reasonable attempt to correct obvious errors, but I expect that there are errors of grammar and possibly content that I did not discover before finalizing the note.

## 2022-08-19 ENCOUNTER — HOSPITAL ENCOUNTER (OUTPATIENT)
Dept: RADIOLOGY | Facility: MEDICAL CENTER | Age: 72
End: 2022-08-19
Attending: INTERNAL MEDICINE
Payer: MEDICARE

## 2022-08-19 DIAGNOSIS — Z78.0 POSTMENOPAUSE: ICD-10-CM

## 2022-08-19 DIAGNOSIS — Z12.31 ENCOUNTER FOR SCREENING MAMMOGRAM FOR BREAST CANCER: ICD-10-CM

## 2022-08-19 PROCEDURE — 77080 DXA BONE DENSITY AXIAL: CPT

## 2022-08-19 PROCEDURE — 77063 BREAST TOMOSYNTHESIS BI: CPT

## 2022-11-09 ENCOUNTER — PATIENT MESSAGE (OUTPATIENT)
Dept: HEALTH INFORMATION MANAGEMENT | Facility: OTHER | Age: 72
End: 2022-11-09

## 2023-01-16 ENCOUNTER — APPOINTMENT (OUTPATIENT)
Dept: MEDICAL GROUP | Facility: MEDICAL CENTER | Age: 73
End: 2023-01-16
Payer: MEDICARE

## 2023-01-26 ENCOUNTER — OFFICE VISIT (OUTPATIENT)
Dept: MEDICAL GROUP | Facility: MEDICAL CENTER | Age: 73
End: 2023-01-26
Payer: MEDICARE

## 2023-01-26 VITALS
DIASTOLIC BLOOD PRESSURE: 70 MMHG | HEART RATE: 51 BPM | OXYGEN SATURATION: 98 % | BODY MASS INDEX: 29.93 KG/M2 | RESPIRATION RATE: 17 BRPM | WEIGHT: 190.7 LBS | TEMPERATURE: 97 F | SYSTOLIC BLOOD PRESSURE: 100 MMHG | HEIGHT: 67 IN

## 2023-01-26 DIAGNOSIS — E55.9 VITAMIN D DEFICIENCY: ICD-10-CM

## 2023-01-26 DIAGNOSIS — Z13.220 ENCOUNTER FOR LIPID SCREENING FOR CARDIOVASCULAR DISEASE: ICD-10-CM

## 2023-01-26 DIAGNOSIS — Z00.00 PREVENTATIVE HEALTH CARE: ICD-10-CM

## 2023-01-26 DIAGNOSIS — M81.0 AGE-RELATED OSTEOPOROSIS WITHOUT CURRENT PATHOLOGICAL FRACTURE: ICD-10-CM

## 2023-01-26 DIAGNOSIS — Z13.6 ENCOUNTER FOR LIPID SCREENING FOR CARDIOVASCULAR DISEASE: ICD-10-CM

## 2023-01-26 DIAGNOSIS — D69.6 THROMBOCYTOPENIA (HCC): Chronic | ICD-10-CM

## 2023-01-26 DIAGNOSIS — Z13.29 THYROID DISORDER SCREEN: ICD-10-CM

## 2023-01-26 PROCEDURE — 99214 OFFICE O/P EST MOD 30 MIN: CPT | Performed by: INTERNAL MEDICINE

## 2023-01-26 RX ORDER — ALENDRONATE SODIUM 70 MG/1
70 TABLET ORAL
Qty: 12 TABLET | Refills: 3 | Status: SHIPPED | OUTPATIENT
Start: 2023-01-26

## 2023-01-26 ASSESSMENT — ENCOUNTER SYMPTOMS
CARDIOVASCULAR NEGATIVE: 1
NEUROLOGICAL NEGATIVE: 1
RESPIRATORY NEGATIVE: 1
GASTROINTESTINAL NEGATIVE: 1
CONSTITUTIONAL NEGATIVE: 1
PSYCHIATRIC NEGATIVE: 1

## 2023-01-26 ASSESSMENT — FIBROSIS 4 INDEX: FIB4 SCORE: 3.12

## 2023-01-26 ASSESSMENT — PATIENT HEALTH QUESTIONNAIRE - PHQ9: CLINICAL INTERPRETATION OF PHQ2 SCORE: 0

## 2023-01-26 NOTE — ASSESSMENT & PLAN NOTE
This is a chronic problem, progressing.  Current regimen: calcium and vitamin D.  History of fractures: Yes   DEXA: as above     CALCIUM:   Lab Results   Component Value Date/Time    CALCIUM 9.4 07/12/2022 0835     VIT D:   Lab Results   Component Value Date/Time    25HYDROXY 31 05/04/2022 0554    - Has multiple options available.  Processed treatment, shared decision to proceed with alendronate.   -Patient will meet with her dentist to discuss starting the treatment  - 1200 mg of calcium daily through diet and/or supplementation  - 2000 to 4000 international units of vitamin D daily  - doing exercises that build muscle tone

## 2023-01-26 NOTE — PROGRESS NOTES
Subjective:     Diagnoses of Age-related osteoporosis without current pathological fracture, Thrombocytopenia (HCC), Preventative health care, Encounter for lipid screening for cardiovascular disease, Vitamin D deficiency, and Thyroid disorder screen were pertinent to this visit.    HPI: Carmita is a pleasant 73 y.o. female who presents today to follow-up on chronic problems.    Problem   Age-Related Osteoporosis Without Current Pathological Fracture    8/19/2022 3:47 PM     HISTORY/REASON FOR EXAM:  Postmenopausal, history of osteopenia, adult bone fracture, family history of osteoporosis, thrombocytosis, right hip surgery.     TECHNIQUE/EXAM DESCRIPTION AND NUMBER OF VIEWS:  Dual x-ray bone densitometry was performed from the L2 through L4 levels and from the proximal left femur utilizing the GE Prodigy unit.     COMPARISON: None.     FINDINGS:  The lumbar spine has a mean bone mineral density of 0.742 g/cm2, with a T score of -3.8 and a Z score of -2.7.     The proximal left femur has a mean bone mineral density of 0.677 g/cm2, with a T score of -2.6 and a Z score of -1.6.     IMPRESSION:     According to the World Health Organization classification, bone mineral density of this patient is osteopenic within the left femur and osteoporotic within the lumbar spine.     10-year Probability of Fracture:  Major Osteoporotic     21.3%  Hip     5.0%  Population      [place] ()     Based on left femur neck BMD    Vitamin D and calcium 1200 mg from all sources.     Thrombocytopenia (Hcc)    Patient has history of thrombocytopenia, followed by Dr. Casey.  Note reviewed: Mild thrombocytopenia, likely immune thrombocytopenia.  Follow-up with hematology yearly.    Lab Results   Component Value Date/Time    WBC 4.8 07/12/2022 08:35 AM    RBC 4.06 (L) 07/12/2022 08:35 AM    HEMOGLOBIN 12.9 07/12/2022 08:35 AM    HEMATOCRIT 38.8 07/12/2022 08:35 AM    MCV 95.6 07/12/2022 08:35 AM    MCH 31.8 07/12/2022 08:35 AM    MCHC 33.2  "(L) 07/12/2022 08:35 AM    MPV 10.1 07/12/2022 08:35 AM                Past Medical History:   Diagnosis Date    Hip fracture requiring operative repair, right, closed, with routine healing, subsequent encounter 5/3/2022    Patient denies medical problems     Thrombocytopenia (HCC)      Current Outpatient Medications Ordered in Epic   Medication Sig Dispense Refill    alendronate (FOSAMAX) 70 MG Tab Take 1 Tablet by mouth every 7 days. 12 Tablet 3    CALCIUM-VITAMIN D PO       Multiple Vitamins-Minerals (MULTIVITAMIN WOMEN 50+) Tab Take 1 Tablet by mouth every morning.      vitamin D3 (CHOLECALCIFEROL) 1000 Unit (25 mcg) Tab Take 1,000 Units by mouth every morning.       No current [facility]-ordered facility-administered medications on file.     Review of Systems   Constitutional: Negative.    HENT: Negative.     Respiratory: Negative.     Cardiovascular: Negative.    Gastrointestinal: Negative.    Genitourinary: Negative.    Neurological: Negative.    Psychiatric/Behavioral: Negative.       Objective:     Exam:  /70 (BP Location: Left arm, Patient Position: Sitting, BP Cuff Size: Adult)   Pulse (!) 51   Temp 36.1 °C (97 °F) (Temporal)   Resp 17   Ht 1.689 m (5' 6.5\")   Wt 86.5 kg (190 lb 11.2 oz)   SpO2 98%   BMI 30.32 kg/m²  Body mass index is 30.32 kg/m².    Physical Exam  Constitutional:       Comments: overweight   HENT:      Head: Normocephalic and atraumatic.      Nose: Nose normal. No congestion or rhinorrhea.      Mouth/Throat:      Mouth: Mucous membranes are moist.      Pharynx: Oropharynx is clear. No oropharyngeal exudate or posterior oropharyngeal erythema.   Eyes:      General: No scleral icterus.  Cardiovascular:      Rate and Rhythm: Normal rate and regular rhythm.      Pulses: Normal pulses.      Heart sounds: Normal heart sounds. No murmur heard.    No gallop.   Pulmonary:      Effort: Pulmonary effort is normal. No respiratory distress.      Breath sounds: Normal breath sounds. No " wheezing or rales.   Skin:     General: Skin is warm and dry.   Neurological:      General: No focal deficit present.      Mental Status: She is alert and oriented to person, place, and time.      Gait: Gait normal.   Psychiatric:         Mood and Affect: Mood normal.         Behavior: Behavior normal.     Labs: per orders   Assessment & Plan:   Carmita  is a pleasant 73 y.o. female with the following -     Problem List Items Addressed This Visit       Age-related osteoporosis without current pathological fracture (Chronic)     This is a chronic problem, progressing.  Current regimen: calcium and vitamin D.  History of fractures: Yes   DEXA: as above     CALCIUM:   Lab Results   Component Value Date/Time    CALCIUM 9.4 07/12/2022 0835   VIT D:   Lab Results   Component Value Date/Time    25HYDROXY 31 05/04/2022 0554    - Has multiple options available.  Processed treatment, shared decision to proceed with alendronate.   -Patient will meet with her dentist to discuss starting the treatment  - 1200 mg of calcium daily through diet and/or supplementation  - 2000 to 4000 international units of vitamin D daily  - doing exercises that build muscle tone           Relevant Medications    alendronate (FOSAMAX) 70 MG Tab    Thrombocytopenia (HCC) (Chronic)     Chronic, stable, monitor CBC yearly.         Relevant Orders    CBC WITH DIFFERENTIAL    Preventative health care    Relevant Orders    Comp Metabolic Panel    CBC WITH DIFFERENTIAL     Other Visit Diagnoses       Encounter for lipid screening for cardiovascular disease        Relevant Orders    Lipid Profile    Vitamin D deficiency        Relevant Orders    VITAMIN D,25 HYDROXY (DEFICIENCY)    Thyroid disorder screen        Relevant Orders    TSH WITH REFLEX TO FT4          Return in about 6 months (around 7/26/2023) for annual wellness visit.    Please note that this dictation was created using voice recognition software. I have made every reasonable attempt to correct  obvious errors, but I expect that there are errors of grammar and possibly content that I did not discover before finalizing the note.

## 2023-06-19 ENCOUNTER — HOSPITAL ENCOUNTER (OUTPATIENT)
Facility: MEDICAL CENTER | Age: 73
End: 2023-06-19
Attending: INTERNAL MEDICINE
Payer: MEDICARE

## 2023-06-19 PROCEDURE — 86803 HEPATITIS C AB TEST: CPT

## 2023-06-20 LAB — HCV AB SER QL: NORMAL

## 2023-07-12 ENCOUNTER — HOSPITAL ENCOUNTER (OUTPATIENT)
Dept: LAB | Facility: MEDICAL CENTER | Age: 73
End: 2023-07-12
Attending: INTERNAL MEDICINE
Payer: MEDICARE

## 2023-07-12 DIAGNOSIS — Z00.00 PREVENTATIVE HEALTH CARE: ICD-10-CM

## 2023-07-12 DIAGNOSIS — D69.6 THROMBOCYTOPENIA (HCC): Chronic | ICD-10-CM

## 2023-07-12 DIAGNOSIS — Z13.220 ENCOUNTER FOR LIPID SCREENING FOR CARDIOVASCULAR DISEASE: ICD-10-CM

## 2023-07-12 DIAGNOSIS — Z13.6 ENCOUNTER FOR LIPID SCREENING FOR CARDIOVASCULAR DISEASE: ICD-10-CM

## 2023-07-12 DIAGNOSIS — E55.9 VITAMIN D DEFICIENCY: ICD-10-CM

## 2023-07-12 DIAGNOSIS — Z13.29 THYROID DISORDER SCREEN: ICD-10-CM

## 2023-07-12 LAB
25(OH)D3 SERPL-MCNC: 32 NG/ML (ref 30–100)
ALBUMIN SERPL BCP-MCNC: 4.1 G/DL (ref 3.2–4.9)
ALBUMIN/GLOB SERPL: 1.6 G/DL
ALP SERPL-CCNC: 71 U/L (ref 30–99)
ALT SERPL-CCNC: 19 U/L (ref 2–50)
ANION GAP SERPL CALC-SCNC: 10 MMOL/L (ref 7–16)
AST SERPL-CCNC: 19 U/L (ref 12–45)
BASOPHILS # BLD AUTO: 0.2 % (ref 0–1.8)
BASOPHILS # BLD: 0.01 K/UL (ref 0–0.12)
BILIRUB SERPL-MCNC: 0.5 MG/DL (ref 0.1–1.5)
BUN SERPL-MCNC: 16 MG/DL (ref 8–22)
CALCIUM ALBUM COR SERPL-MCNC: 9.3 MG/DL (ref 8.5–10.5)
CALCIUM SERPL-MCNC: 9.4 MG/DL (ref 8.5–10.5)
CHLORIDE SERPL-SCNC: 101 MMOL/L (ref 96–112)
CHOLEST SERPL-MCNC: 207 MG/DL (ref 100–199)
CO2 SERPL-SCNC: 26 MMOL/L (ref 20–33)
COMMENT 1642: NORMAL
CREAT SERPL-MCNC: 0.67 MG/DL (ref 0.5–1.4)
EOSINOPHIL # BLD AUTO: 0.03 K/UL (ref 0–0.51)
EOSINOPHIL NFR BLD: 0.7 % (ref 0–6.9)
ERYTHROCYTE [DISTWIDTH] IN BLOOD BY AUTOMATED COUNT: 50.4 FL (ref 35.9–50)
FASTING STATUS PATIENT QL REPORTED: NORMAL
GFR SERPLBLD CREATININE-BSD FMLA CKD-EPI: 93 ML/MIN/1.73 M 2
GLOBULIN SER CALC-MCNC: 2.5 G/DL (ref 1.9–3.5)
GLUCOSE SERPL-MCNC: 89 MG/DL (ref 65–99)
HCT VFR BLD AUTO: 39.4 % (ref 37–47)
HDLC SERPL-MCNC: 62 MG/DL
HGB BLD-MCNC: 13.1 G/DL (ref 12–16)
IMM GRANULOCYTES # BLD AUTO: 0.01 K/UL (ref 0–0.11)
IMM GRANULOCYTES NFR BLD AUTO: 0.2 % (ref 0–0.9)
LDLC SERPL CALC-MCNC: 135 MG/DL
LYMPHOCYTES # BLD AUTO: 1.92 K/UL (ref 1–4.8)
LYMPHOCYTES NFR BLD: 46.6 % (ref 22–41)
MCH RBC QN AUTO: 32.6 PG (ref 27–33)
MCHC RBC AUTO-ENTMCNC: 33.2 G/DL (ref 32.2–35.5)
MCV RBC AUTO: 98 FL (ref 81.4–97.8)
MONOCYTES # BLD AUTO: 0.57 K/UL (ref 0–0.85)
MONOCYTES NFR BLD AUTO: 13.8 % (ref 0–13.4)
MORPHOLOGY BLD-IMP: NORMAL
NEUTROPHILS # BLD AUTO: 1.58 K/UL (ref 1.82–7.42)
NEUTROPHILS NFR BLD: 38.5 % (ref 44–72)
NRBC # BLD AUTO: 0 K/UL
NRBC BLD-RTO: 0 /100 WBC (ref 0–0.2)
OVALOCYTES BLD QL SMEAR: NORMAL
PLATELET # BLD AUTO: 75 K/UL (ref 164–446)
PLATELET BLD QL SMEAR: NORMAL
PLATELETS.RETICULATED NFR BLD AUTO: 4.1 % (ref 0.6–13.1)
PMV BLD AUTO: 11.1 FL (ref 9–12.9)
POIKILOCYTOSIS BLD QL SMEAR: NORMAL
POTASSIUM SERPL-SCNC: 4.8 MMOL/L (ref 3.6–5.5)
PROT SERPL-MCNC: 6.6 G/DL (ref 6–8.2)
RBC # BLD AUTO: 4.02 M/UL (ref 4.2–5.4)
RBC BLD AUTO: PRESENT
SODIUM SERPL-SCNC: 137 MMOL/L (ref 135–145)
TRIGL SERPL-MCNC: 51 MG/DL (ref 0–149)
TSH SERPL DL<=0.005 MIU/L-ACNC: 2.37 UIU/ML (ref 0.38–5.33)
WBC # BLD AUTO: 4.1 K/UL (ref 4.8–10.8)

## 2023-07-12 PROCEDURE — 80061 LIPID PANEL: CPT

## 2023-07-12 PROCEDURE — 84443 ASSAY THYROID STIM HORMONE: CPT | Mod: GA

## 2023-07-12 PROCEDURE — 80053 COMPREHEN METABOLIC PANEL: CPT

## 2023-07-12 PROCEDURE — 85055 RETICULATED PLATELET ASSAY: CPT

## 2023-07-12 PROCEDURE — 82306 VITAMIN D 25 HYDROXY: CPT

## 2023-07-12 PROCEDURE — 36415 COLL VENOUS BLD VENIPUNCTURE: CPT | Mod: GA

## 2023-07-12 PROCEDURE — 85025 COMPLETE CBC W/AUTO DIFF WBC: CPT

## 2023-08-07 ENCOUNTER — APPOINTMENT (OUTPATIENT)
Dept: MEDICAL GROUP | Facility: MEDICAL CENTER | Age: 73
End: 2023-08-07
Payer: MEDICARE

## 2023-08-28 ENCOUNTER — OFFICE VISIT (OUTPATIENT)
Dept: MEDICAL GROUP | Facility: MEDICAL CENTER | Age: 73
End: 2023-08-28
Payer: MEDICARE

## 2023-08-28 VITALS
OXYGEN SATURATION: 97 % | TEMPERATURE: 98.1 F | DIASTOLIC BLOOD PRESSURE: 68 MMHG | WEIGHT: 191.36 LBS | HEART RATE: 60 BPM | BODY MASS INDEX: 30.03 KG/M2 | SYSTOLIC BLOOD PRESSURE: 120 MMHG | HEIGHT: 67 IN

## 2023-08-28 DIAGNOSIS — Z12.11 COLON CANCER SCREENING: ICD-10-CM

## 2023-08-28 DIAGNOSIS — H44.811 EYE HEMORRHAGE, RIGHT: ICD-10-CM

## 2023-08-28 DIAGNOSIS — Z23 NEED FOR VACCINATION: ICD-10-CM

## 2023-08-28 DIAGNOSIS — G89.29 CHRONIC PAIN OF RIGHT KNEE: ICD-10-CM

## 2023-08-28 DIAGNOSIS — D69.6 THROMBOCYTOPENIA (HCC): Chronic | ICD-10-CM

## 2023-08-28 DIAGNOSIS — S72.001D CLOSED FRACTURE OF NECK OF RIGHT FEMUR WITH ROUTINE HEALING, SUBSEQUENT ENCOUNTER: ICD-10-CM

## 2023-08-28 DIAGNOSIS — E78.5 HYPERLIPIDEMIA, UNSPECIFIED HYPERLIPIDEMIA TYPE: ICD-10-CM

## 2023-08-28 DIAGNOSIS — M25.561 CHRONIC PAIN OF RIGHT KNEE: ICD-10-CM

## 2023-08-28 PROCEDURE — 90471 IMMUNIZATION ADMIN: CPT | Performed by: INTERNAL MEDICINE

## 2023-08-28 PROCEDURE — 90715 TDAP VACCINE 7 YRS/> IM: CPT | Performed by: INTERNAL MEDICINE

## 2023-08-28 PROCEDURE — 3074F SYST BP LT 130 MM HG: CPT | Performed by: INTERNAL MEDICINE

## 2023-08-28 PROCEDURE — 3078F DIAST BP <80 MM HG: CPT | Performed by: INTERNAL MEDICINE

## 2023-08-28 PROCEDURE — 99214 OFFICE O/P EST MOD 30 MIN: CPT | Mod: 25 | Performed by: INTERNAL MEDICINE

## 2023-08-28 RX ORDER — NAPROXEN 250 MG/1
TABLET ORAL
COMMUNITY
End: 2023-08-28

## 2023-08-28 RX ORDER — HYDROCODONE BITARTRATE AND ACETAMINOPHEN 5; 325 MG/1; MG/1
TABLET ORAL
COMMUNITY
End: 2023-08-28

## 2023-08-28 ASSESSMENT — FIBROSIS 4 INDEX: FIB4 SCORE: 4.13

## 2023-08-28 NOTE — PROGRESS NOTES
Subjective:     CC:   Diagnoses of Need for vaccination, Closed fracture of neck of right femur with routine healing, subsequent encounter, Chronic pain of right knee, Hyperlipidemia, unspecified hyperlipidemia type, Eye hemorrhage, right, Colon cancer screening, and Thrombocytopenia (HCC) were pertinent to this visit.    HPI: Carmita is a pleasant 73 y.o. female who presents today discuss multiple symptoms:    Problem   Hyperlipidemia    Noted on routine blood work.  Patient tells me she is a stress eater.    The 10-year ASCVD risk score (Evy COSME, et al., 2019) is: 9.3%    Lab Results   Component Value Date/Time    CHOLSTRLTOT 207 (H) 07/12/2023 07:53 AM     (H) 07/12/2023 07:53 AM    HDL 62 07/12/2023 07:53 AM    TRIGLYCERIDE 51 07/12/2023 07:53 AM   I discussed healthful lifestyle measures: Low carbohydrate diet, 30-minute exercise daily,   Omega 3  supplementation     Need for Vaccination    She is due for Tdap, would like to get it at the office today.  Aware of the co-pay.  She is also due for shingles, COVID-19 and flu-will get through the pharmacy.     Chronic Pain of Right Knee    Chronic problem, patient has history of bilateral knee osteoarthritis, right worse than left.    Referral to orthopedic specialist.     Eye Hemorrhage, Right    Patient was told by ophthalmology, that she has had retinal tear, her next appointment with ophthalmology is on September 2023      Thrombocytopenia (Hcc)    Patient has history of thrombocytopenia, followed by Dr. Casey.  Note reviewed: Mild thrombocytopenia, likely immune thrombocytopenia.  Follow-up with hematology q 6 mo     Lab Results   Component Value Date/Time    WBC 4.1 (L) 07/12/2023 07:53 AM    RBC 4.02 (L) 07/12/2023 07:53 AM    HEMOGLOBIN 13.1 07/12/2023 07:53 AM    HEMATOCRIT 39.4 07/12/2023 07:53 AM    MCV 98.0 (H) 07/12/2023 07:53 AM    MCH 32.6 07/12/2023 07:53 AM    MCHC 33.2 07/12/2023 07:53 AM    MPV 11.1 07/12/2023 07:53 AM           Fracture  "of Femoral Neck, Right, Closed (Hcc)    Status post ORIF, closed treatment without manipulation.  She recently lifted a 40 pound bag of garden supplies and she has been experiencing intermittent pain in her right hip as well as right knee pain is controlled, occasional Tylenol as needed.         Past Medical History:   Diagnosis Date    Hip fracture requiring operative repair, right, closed, with routine healing, subsequent encounter 5/3/2022    Patient denies medical problems     Thrombocytopenia (HCC)      Current Outpatient Medications Ordered in Epic   Medication Sig Dispense Refill    alendronate (FOSAMAX) 70 MG Tab Take 1 Tablet by mouth every 7 days. 12 Tablet 3    CALCIUM-VITAMIN D PO       Multiple Vitamins-Minerals (MULTIVITAMIN WOMEN 50+) Tab Take 1 Tablet by mouth every morning.      vitamin D3 (CHOLECALCIFEROL) 1000 Unit (25 mcg) Tab Take 1,000 Units by mouth every morning.       No current [facility]-ordered facility-administered medications on file.     Review of Systems   Musculoskeletal:  Positive for joint pain.   All other systems reviewed and are negative.    Objective:     Exam:  /68 (BP Location: Left arm, Patient Position: Sitting, BP Cuff Size: Adult)   Pulse 60   Temp 36.7 °C (98.1 °F) (Temporal)   Ht 1.689 m (5' 6.5\")   Wt 86.8 kg (191 lb 5.8 oz)   SpO2 97%   BMI 30.42 kg/m²  Body mass index is 30.42 kg/m².    Physical Exam  Neurological:      Mental Status: She is alert and oriented to person, place, and time.   Psychiatric:         Mood and Affect: Mood normal.       Labs: Reviewed CBC, CMP, lipid panel, vitamin D TSH from 7/12/2023    Assessment & Plan:   Camrita  is a pleasant 73 y.o. female with the following -     Problem List Items Addressed This Visit       Thrombocytopenia (HCC) (Chronic)     Chronic problem, stable, monitor CBC every 6 months, avoid NSAIDs, follow-up with hematology         Chronic pain of right knee     Obtain x-ray, refer to Ortho.         Relevant Orders    " Referral to Orthopedics    DX-KNEE 3 VIEWS Atraumatic Pain/Swelling/Deformity    Eye hemorrhage, right    Fracture of femoral neck, right, closed (HCC)     Follow-up with Ortho         Hyperlipidemia     We will manage with lifestyle measures, reassess in 6 months         Relevant Orders    Lipid Profile    Need for vaccination    Relevant Orders    Tdap =>6yo IM (Completed)     Other Visit Diagnoses       Colon cancer screening        Relevant Orders    COLOGUARD (FIT DNA)            Return in about 6 months (around 2/28/2024), or if symptoms worsen or fail to improve, for choleslterol , Medicare Wellness visit.    Please note that this dictation was created using voice recognition software. I have made every reasonable attempt to correct obvious errors, but I expect that there are errors of grammar and possibly content that I did not discover before finalizing the note.

## 2023-09-07 ENCOUNTER — OFFICE VISIT (OUTPATIENT)
Dept: URGENT CARE | Facility: CLINIC | Age: 73
End: 2023-09-07
Payer: MEDICARE

## 2023-09-07 VITALS
RESPIRATION RATE: 20 BRPM | DIASTOLIC BLOOD PRESSURE: 82 MMHG | OXYGEN SATURATION: 97 % | HEART RATE: 51 BPM | WEIGHT: 185 LBS | TEMPERATURE: 98.3 F | HEIGHT: 67 IN | BODY MASS INDEX: 29.03 KG/M2 | SYSTOLIC BLOOD PRESSURE: 116 MMHG

## 2023-09-07 DIAGNOSIS — J06.9 VIRAL URI WITH COUGH: ICD-10-CM

## 2023-09-07 LAB
FLUAV RNA SPEC QL NAA+PROBE: NEGATIVE
FLUBV RNA SPEC QL NAA+PROBE: NEGATIVE
RSV RNA SPEC QL NAA+PROBE: NEGATIVE
SARS-COV-2 RNA RESP QL NAA+PROBE: NEGATIVE

## 2023-09-07 PROCEDURE — 3079F DIAST BP 80-89 MM HG: CPT | Performed by: REGISTERED NURSE

## 2023-09-07 PROCEDURE — 0241U POCT CEPHEID COV-2, FLU A/B, RSV - PCR: CPT | Performed by: REGISTERED NURSE

## 2023-09-07 PROCEDURE — 99213 OFFICE O/P EST LOW 20 MIN: CPT | Performed by: REGISTERED NURSE

## 2023-09-07 PROCEDURE — 3074F SYST BP LT 130 MM HG: CPT | Performed by: REGISTERED NURSE

## 2023-09-07 ASSESSMENT — ENCOUNTER SYMPTOMS
SHORTNESS OF BREATH: 0
PALPITATIONS: 0
DIZZINESS: 0
NECK PAIN: 0
MYALGIAS: 0
FEVER: 0
HEADACHES: 0

## 2023-09-07 ASSESSMENT — FIBROSIS 4 INDEX: FIB4 SCORE: 4.13

## 2023-09-07 NOTE — PROGRESS NOTES
"Subjective:   Carmita Miller is a 73 y.o. female who presents for Cough (X4 days, sore throat, chills, congested, upset stomach, and cold sweat)    HPI  4 days of chills, congestion, sore throat, non productive cough, upset stomach. Concerned for COVID-19, her  has surgery scheduled 09/14/2023.  She is feeling better today versus yesterday.  Has been using over-the-counter medications good response.  Does have history of thrombocytopenia, no pertinent heart or lung history.  Tolerating p.o.  No recent antibiotics or hospitalizations.  Immunizations current.    Review of Systems   Constitutional:  Negative for fever.   Respiratory:  Negative for shortness of breath.    Cardiovascular:  Negative for chest pain, palpitations and leg swelling.   Musculoskeletal:  Negative for myalgias and neck pain.   Neurological:  Negative for dizziness and headaches.       Medications, Allergies, and current problem list reviewed today in Epic.     Objective:     /82   Pulse (!) 51   Temp 36.8 °C (98.3 °F) (Temporal)   Resp 20   Ht 1.702 m (5' 7\")   Wt 83.9 kg (185 lb)   SpO2 97%     Physical Exam  Vitals and nursing note reviewed.   Constitutional:       General: She is not in acute distress.     Appearance: Normal appearance. She is well-developed. She is not ill-appearing, toxic-appearing or diaphoretic.   HENT:      Head: Normocephalic and atraumatic.      Right Ear: Tympanic membrane, ear canal and external ear normal.      Left Ear: Tympanic membrane, ear canal and external ear normal.      Nose: Nose normal. No congestion or rhinorrhea.      Mouth/Throat:      Mouth: Mucous membranes are moist.      Pharynx: No oropharyngeal exudate or posterior oropharyngeal erythema.   Eyes:      General:         Right eye: No discharge.         Left eye: No discharge.      Conjunctiva/sclera: Conjunctivae normal.   Cardiovascular:      Rate and Rhythm: Normal rate and regular rhythm.      Pulses: Normal pulses.      " Heart sounds: Normal heart sounds.   Pulmonary:      Effort: Pulmonary effort is normal. No respiratory distress.      Breath sounds: Normal breath sounds. No wheezing, rhonchi or rales.   Musculoskeletal:      Cervical back: Normal range of motion and neck supple.      Right lower leg: No edema.      Left lower leg: No edema.   Lymphadenopathy:      Cervical: No cervical adenopathy.   Skin:     General: Skin is warm and dry.   Neurological:      General: No focal deficit present.      Mental Status: She is alert and oriented to person, place, and time. Mental status is at baseline.   Psychiatric:         Mood and Affect: Mood normal.         Behavior: Behavior normal.         Thought Content: Thought content normal.         Judgment: Judgment normal.         Assessment/Plan:     Diagnosis and associated orders:     1. Viral URI with cough  POCT CoV-2, Flu A/B, RSV by PCR           Comments/MDM:   Differential diagnosis discussed     PRESENTATION & PERTINENT Hx: Non-toxic appearance.  4 days of viral URI symptoms, she is improving but needing testing given 's upcoming surgery. Pertinent medical history includes: None.  Immunizations current    TESTING: Viral Cepheid panel negative    Vital Signs: Thankfully vital signs within normal limits.    EXAM: Well-appearing, talking in full sentences, neurologically intact, no increased work of breathing, normal ear nose and throat findings, no adventitious heart or lung sounds remainder of exam is benign and w/o red flag signs or symptoms. Remainder of exam is benign and w/o red flag signs or symptoms    PLAN: Offered reassurance. Recommend adequate hydration, rest, deep breathing and coughing, ambulation as tolerated, OTC age appropriate medications    Follow up: With primary care provider       Return to clinic or go to ED if symptoms worsen or persist. Indications for ED discussed at length. Patient/Parent/Guardian voices understanding. Follow-up with your primary  care provider in 3-5 days. Red flag symptoms discussed. All side effects of medication discussed including allergic response, GI upset, tendon injury, rash, sedation etc.    I personally reviewed prior external notes and test results pertinent to today's visit as well as additional imaging and testing completed in clinic today.     Please note that this dictation was created using voice recognition software. I have made every reasonable attempt to correct obvious errors, but I expect that there are errors of grammar and possibly content that I did not discover before finalizing the note.    This note was electronically signed by SHAKIR Madera

## 2023-12-21 PROCEDURE — 99283 EMERGENCY DEPT VISIT LOW MDM: CPT

## 2023-12-21 ASSESSMENT — FIBROSIS 4 INDEX: FIB4 SCORE: 4.13

## 2023-12-22 ENCOUNTER — HOSPITAL ENCOUNTER (EMERGENCY)
Facility: MEDICAL CENTER | Age: 73
End: 2023-12-22
Attending: EMERGENCY MEDICINE
Payer: MEDICARE

## 2023-12-22 VITALS
RESPIRATION RATE: 16 BRPM | SYSTOLIC BLOOD PRESSURE: 154 MMHG | BODY MASS INDEX: 29.1 KG/M2 | HEIGHT: 68 IN | DIASTOLIC BLOOD PRESSURE: 71 MMHG | WEIGHT: 192.02 LBS | OXYGEN SATURATION: 96 % | HEART RATE: 68 BPM | TEMPERATURE: 97.6 F

## 2023-12-22 DIAGNOSIS — L50.9 HIVES: ICD-10-CM

## 2023-12-22 PROCEDURE — 700111 HCHG RX REV CODE 636 W/ 250 OVERRIDE (IP): Performed by: EMERGENCY MEDICINE

## 2023-12-22 RX ORDER — DEXAMETHASONE SODIUM PHOSPHATE 10 MG/ML
6 INJECTION, SOLUTION INTRAMUSCULAR; INTRAVENOUS ONCE
Status: COMPLETED | OUTPATIENT
Start: 2023-12-22 | End: 2023-12-22

## 2023-12-22 RX ADMIN — DEXAMETHASONE SODIUM PHOSPHATE 6 MG: 10 INJECTION, SOLUTION INTRAMUSCULAR; INTRAVENOUS at 01:30

## 2023-12-22 NOTE — ED TRIAGE NOTES
"Chief Complaint   Patient presents with    Rash    Itching   Patient ambulatory to triage with complaint of rash and urticaria on trunk, bilateral arm and legs started today at 9:30 pm. Patient endorses she has thrombocytopenia , the platelet count was done on 07/12/23 which was 75.    Patient denies any SOB, fever, cough, patient AAO X4, respirations even and unlabored on room air, afebrile.     BP (!) 159/67   Pulse (!) 58   Temp 36.4 °C (97.6 °F) (Temporal)   Resp 16   Ht 1.737 m (5' 8.4\")   Wt 87.1 kg (192 lb 0.3 oz)   SpO2 98%   BMI 28.86 kg/m²     Pt made aware of triage process, placed back into lobby, educated pt to tell staff of any worsening of symptoms      Past Medical History:   Diagnosis Date    Hip fracture requiring operative repair, right, closed, with routine healing, subsequent encounter 5/3/2022    Thrombocytopenia (HCC)       Past Surgical History:   Procedure Laterality Date    ORIF, HIP Right 04/29/2022    Procedure: ORIF, HIP, SPLINTING RIGHT WRIST;  Surgeon: Cassius Zuniga M.D.;  Location: SURGERY [facility];  Service: Orthopedics    TONSILLECTOMY        "

## 2023-12-22 NOTE — ED PROVIDER NOTES
ED Provider Note    CHIEF COMPLAINT  Chief Complaint   Patient presents with    Rash    Itching       EXTERNAL RECORDS REVIEWED  Outpatient Notes patient has a history of ITP    HPI/ROS  LIMITATION TO HISTORY   Select: : None  OUTSIDE HISTORIAN(S):  olivia    Carmita Miller is a 73 y.o. female who presents to the emerged part with chief complaint of a rash.  She states that she got her RSV vaccine earlier this week.  In the first day or so she had a headache and just feeling kind of generally ill in the last 2 days she has developed a rash.  She states yesterday it was itchy and came out of nowhere and then kind of went away.  Then again the rash came back this evening a bit stronger so she was concerned.  No difficulty breathing no oral swelling no facial swelling no wheezing.    She was concerned because she has been told that if she develops a rash on her arms and legs she needs to come in because it may be petechiae secondary to her ITP    PAST MEDICAL HISTORY   has a past medical history of Hip fracture requiring operative repair, right, closed, with routine healing, subsequent encounter (5/3/2022), Patient denies medical problems, and Thrombocytopenia (HCC).    SURGICAL HISTORY   has a past surgical history that includes orif, hip (Right, 04/29/2022) and tonsillectomy.    FAMILY HISTORY  Family History   Problem Relation Age of Onset    Cancer Mother         Non-small cell Lung cancer    Rheumatologic Disease Mother     Heart Disease Father     Cancer Father         MALT    Heart Disease Sister     Cancer Sister         neuroendocrine Ca    Cancer Brother         basal cell Ca, melanoma    Anemia Maternal Uncle     Cancer Paternal Aunt         colon Ca    Cancer Paternal Aunt         colon    Cancer Paternal Uncle         prostate    Heart Disease Maternal Grandmother         70    Heart Disease Maternal Grandfather         91    Cancer Paternal Grandfather         RCC    Ovarian Cancer Other     Diabetes Neg Hx  "    Breast Cancer Neg Hx        SOCIAL HISTORY  Social History     Tobacco Use    Smoking status: Former     Current packs/day: 0.00     Average packs/day: 0.3 packs/day for 20.0 years (5.0 ttl pk-yrs)     Types: Cigarettes     Start date: 5/27/1993     Quit date: 5/27/2013     Years since quitting: 10.5    Smokeless tobacco: Never   Vaping Use    Vaping Use: Never used   Substance and Sexual Activity    Alcohol use: Yes     Comment: occasional    Drug use: Not Currently    Sexual activity: Not on file       CURRENT MEDICATIONS  Home Medications    **Home medications have not yet been reviewed for this encounter**         ALLERGIES  Allergies   Allergen Reactions    Ibuprofen     Seasonal     [place]        PHYSICAL EXAM  VITAL SIGNS: BP (!) 159/67   Pulse (!) 58   Temp 36.4 °C (97.6 °F) (Temporal)   Resp 16   Ht 1.737 m (5' 8.4\")   Wt 87.1 kg (192 lb 0.3 oz)   SpO2 98%   BMI 28.86 kg/m²    Pulse OX: Pulse Oxygen level is normal  Constitutional: Alert in no apparent distress.  HENT: Normocephalic, Atraumatic, MMM  Eyes: PERound. Conjunctiva normal, non-icteric.   Heart: Regular rate and rhythm, intact distal pulses   Lungs: Symmetrical movement, no resp distress clear to auscultation bilaterally  Skin: Warm, Dry, No erythema, scattered wheals with central clearing on the upper and lower extremities a little bit on the abdominal wall consistent with hives, no petechiae no ecchymosis noted  Neurologic: Alert and oriented, Grossly non-focal.       DIAGNOSTIC STUDIES / PROCEDURES      COURSE & MEDICAL DECISION MAKING    ED Observation Status? No; Patient does not meet criteria for ED Observation.     INITIAL ASSESSMENT, COURSE AND PLAN/ DISPOSITION AND DISCUSSIONS  Care Narrative:     Patient is a 73-year-old female with a past medical history of ITP that is just chronically watched.  She does not have petechiae at this time she does not fact have hives.  Considering she just received her RSV vaccine this " week it could be related that she has not changed anything else cosmetics foods or diets.  We discussed Benadryl as needed for itching she will be given a single dose of Dex here in the ED and that we discussed return precautions for any new or worsening issues such as other signs of worsening allergy or anaphylaxis even though there is nothing currently today no wheezing or oropharynx or facial swelling      I have discussed management of the patient with the following physicians and GISSELLE's:  none    Discussion of management with other QHP or appropriate source(s): None     Escalation of care considered, and ultimately not performed:blood analysis    Barriers to care at this time, including but not limited to:  na .     Decision tools and prescription drugs considered including, but not limited to:  na .    The patient will return for new or worsening symptoms and is stable at the time of discharge.    The patient is referred to a primary physician for blood pressure management, diabetic screening, and for all other preventative health concerns.    DISPOSITION:  Patient will be discharged home in stable condition.    FOLLOW UP:  Cami Carrington M.D.  [address] [id number] [phone]    Schedule an appointment as soon as possible for a visit       [facility], Emergency Dept  [address] [facility] [address] [phone]    If symptoms worsen      OUTPATIENT MEDICATIONS:  Discharge Medication List as of 12/22/2023  1:32 AM            FINAL DIAGNOSIS  1. Hives           Electronically signed by: Yadira Nicole M.D., 12/22/2023 1:10 AM

## 2023-12-23 ENCOUNTER — OFFICE VISIT (OUTPATIENT)
Dept: URGENT CARE | Facility: CLINIC | Age: 73
End: 2023-12-23
Payer: MEDICARE

## 2023-12-23 VITALS
HEART RATE: 59 BPM | TEMPERATURE: 97.6 F | HEIGHT: 67 IN | OXYGEN SATURATION: 98 % | BODY MASS INDEX: 30.13 KG/M2 | RESPIRATION RATE: 14 BRPM | DIASTOLIC BLOOD PRESSURE: 82 MMHG | WEIGHT: 192 LBS | SYSTOLIC BLOOD PRESSURE: 124 MMHG

## 2023-12-23 DIAGNOSIS — L50.9 URTICARIA: ICD-10-CM

## 2023-12-23 PROCEDURE — 3079F DIAST BP 80-89 MM HG: CPT | Performed by: PHYSICIAN ASSISTANT

## 2023-12-23 PROCEDURE — 99213 OFFICE O/P EST LOW 20 MIN: CPT | Performed by: PHYSICIAN ASSISTANT

## 2023-12-23 PROCEDURE — 3074F SYST BP LT 130 MM HG: CPT | Performed by: PHYSICIAN ASSISTANT

## 2023-12-23 RX ORDER — METHYLPREDNISOLONE 4 MG/1
4 TABLET ORAL DAILY
Qty: 21 TABLET | Refills: 0 | Status: SHIPPED | OUTPATIENT
Start: 2023-12-23

## 2023-12-23 ASSESSMENT — ENCOUNTER SYMPTOMS
CHILLS: 0
SORE THROAT: 0
NAUSEA: 0
DIZZINESS: 0
EYE REDNESS: 0
VOMITING: 0
ABDOMINAL PAIN: 0
EYE DISCHARGE: 0
DIAPHORESIS: 0
COUGH: 0
FEVER: 0
DIARRHEA: 0
EYE PAIN: 0
WHEEZING: 0
CONSTIPATION: 0
SINUS PAIN: 0
SHORTNESS OF BREATH: 0
HEADACHES: 0

## 2023-12-23 ASSESSMENT — FIBROSIS 4 INDEX: FIB4 SCORE: 4.13

## 2023-12-23 NOTE — PROGRESS NOTES
Subjective:     Carmita Miller  is a 73 y.o. female who presents for Urticaria (Hives not improving since last visit at the ER, chest pain, SOB )       She presents today for ongoing urticarial rash that has been present since yesterday.  She has now developed mild labored breathing as well as chest discomfort that occurred this morning but has resolved.  She denies any fevers.  She was seen in the ER on 12/22/2023 for this rash and was given oral Decadron and instructed to begin using Benadryl, but without relief.  She states that she is having difficulty with the sedate of side effects of the Benadryl.  Denies any recent lifestyle changes.  No new lotions ointments or creams.  No recent travel.  No exposure to any known allergy contacts/contaminants.  She did have an RSV vaccine performed 5 days ago.  Past medical history is positive for ITP, ER provider noted that there was no petechial rash present on exam.  At this time she denies fever/chills/sweats, chest pain, shortness of breath, nausea/vomiting, abdominal pain, diarrhea.       Review of Systems   Constitutional:  Negative for chills, diaphoresis, fever and malaise/fatigue.   HENT:  Negative for congestion, ear discharge, sinus pain and sore throat.    Eyes:  Negative for pain, discharge and redness.   Respiratory:  Negative for cough, shortness of breath and wheezing.    Cardiovascular:  Negative for chest pain.   Gastrointestinal:  Negative for abdominal pain, constipation, diarrhea, nausea and vomiting.   Skin:  Positive for itching and rash.   Neurological:  Negative for dizziness and headaches.      Allergies   Allergen Reactions    Ibuprofen     Seasonal     [place]      Past Medical History:   Diagnosis Date    Hip fracture requiring operative repair, right, closed, with routine healing, subsequent encounter 5/3/2022    Patient denies medical problems     Thrombocytopenia (HCC)         Objective:   /82   Pulse (!) 59   Temp 36.4  "°C (97.6 °F) (Temporal)   Resp 14   Ht 1.702 m (5' 7\")   Wt 87.1 kg (192 lb)   SpO2 98%   BMI 30.07 kg/m²   Physical Exam  Vitals and nursing note reviewed.   Constitutional:       General: She is not in acute distress.     Appearance: Normal appearance. She is not ill-appearing, toxic-appearing or diaphoretic.   HENT:      Head: Normocephalic.      Right Ear: Tympanic membrane, ear canal and external ear normal. There is no impacted cerumen.      Left Ear: Tympanic membrane, ear canal and external ear normal. There is no impacted cerumen.      Nose: No congestion or rhinorrhea.      Mouth/Throat:      Mouth: Mucous membranes are moist.      Pharynx: No oropharyngeal exudate or posterior oropharyngeal erythema.      Comments: Posterior oropharynx is patent.  No tonsillar swelling, bilaterally.  No soft tissue swelling of the sublingual mucosa, no swelling of the soft or hard palate, no unilarteral oral pharynx swelling, no uvular deviation.  Eyes:      General:         Right eye: No discharge.         Left eye: No discharge.      Conjunctiva/sclera: Conjunctivae normal.   Cardiovascular:      Rate and Rhythm: Normal rate and regular rhythm.   Pulmonary:      Effort: Pulmonary effort is normal. No respiratory distress.      Breath sounds: Normal breath sounds. No stridor. No wheezing or rhonchi.   Musculoskeletal:      Cervical back: Neck supple.   Lymphadenopathy:      Cervical: No cervical adenopathy.   Skin:     Comments: Diffuse erythematous urticarial rash present on arms and trunk.  No petechial rash present   Neurological:      General: No focal deficit present.      Mental Status: She is alert and oriented to person, place, and time.   Psychiatric:         Mood and Affect: Mood normal.         Behavior: Behavior normal.         Thought Content: Thought content normal.         Judgment: Judgment normal.             Diagnostic testing: None    Assessment/Plan:     Encounter Diagnoses   Name Primary?    " Urticaria           Plan for care for today's complaint includes starting the patient on Medrol Dosepak, having her discontinue Benadryl and begin taking a second-generation over-the-counter antihistamine medication to avoid the sedate of side effects of the first generation antihistamine medications.  Physical exam did reveal urticarial rash but no petechial rash during today's exam. Posterior oropharynx was patent, no angioedema or anaphylaxis on today's exam.  Lung auscultation was normal, no wheezing and no accessory muscle use/respiratory distress.  Vital signs were stable today.  Continue to monitor symptoms and return to urgent care or follow-up with primary care provider if symptoms remain ongoing.  Follow-up in the emergency department if symptoms become severe, ER precautions discussed in office today..  Prescription for Medrol Dosepak provided.    See AVS Instructions below for written guidance provided to patient on after-visit management and care in addition to our verbal discussion during the visit.    Please note that this dictation was created using voice recognition software. I have attempted to correct all errors, but there may be sound-alike, spelling, grammar and possibly content errors that I did not discover before finalizing the note.    Chi Keita PA-C

## 2023-12-27 ENCOUNTER — OFFICE VISIT (OUTPATIENT)
Dept: MEDICAL GROUP | Facility: MEDICAL CENTER | Age: 73
End: 2023-12-27
Payer: MEDICARE

## 2023-12-27 VITALS
TEMPERATURE: 98.6 F | WEIGHT: 189.6 LBS | BODY MASS INDEX: 29.76 KG/M2 | SYSTOLIC BLOOD PRESSURE: 144 MMHG | DIASTOLIC BLOOD PRESSURE: 82 MMHG | OXYGEN SATURATION: 97 % | HEART RATE: 64 BPM | HEIGHT: 67 IN

## 2023-12-27 DIAGNOSIS — T88.7XXA MEDICATION SIDE EFFECT: ICD-10-CM

## 2023-12-27 DIAGNOSIS — L50.9 URTICARIA: ICD-10-CM

## 2023-12-27 DIAGNOSIS — D69.6 THROMBOCYTOPENIA (HCC): Chronic | ICD-10-CM

## 2023-12-27 DIAGNOSIS — R03.0 ELEVATED BLOOD PRESSURE READING: ICD-10-CM

## 2023-12-27 PROCEDURE — 3077F SYST BP >= 140 MM HG: CPT | Performed by: INTERNAL MEDICINE

## 2023-12-27 PROCEDURE — 99214 OFFICE O/P EST MOD 30 MIN: CPT | Performed by: INTERNAL MEDICINE

## 2023-12-27 PROCEDURE — 3079F DIAST BP 80-89 MM HG: CPT | Performed by: INTERNAL MEDICINE

## 2023-12-27 ASSESSMENT — FIBROSIS 4 INDEX: FIB4 SCORE: 4.13

## 2023-12-28 NOTE — ASSESSMENT & PLAN NOTE
Continue steroids as prescribed, switch to nondrowsy antihistamines if needed after steroids are completed.

## 2023-12-28 NOTE — PROGRESS NOTES
Subjective:     CC  Diagnoses of Urticaria, Thrombocytopenia (HCC), Elevated blood pressure reading, and Medication side effect were pertinent to this visit.    HPI: Carmita is a pleasant 73 y.o. female who presents today with her  Anthony for follow up     Problem   Urticaria    Patient was  initially evaluated in  emergency room 12/21/2023 due to concern for new onset rash.  She was administered RSV vaccine several days prior to arrival to the emergency room.  Patient was evaluated, there was no petechial rash noted, she was given a dose of dexamethasone and discharged home with recommendation to take Benadryl.    She was subsequently evaluated in urgent care on 12/23/2023 as her symptoms were persistent despite use of Benadryl.  On physical exam she had classical tachycardia rash, she was prescribed a course of Medrol Dosepak.     She presents today for follow-up.  She is feeling better, she has 2 more days left of Medrol Dosepak.    I recommended to switch to nondrowsy antihistamine if needed after completion of the steroids.         Elevated Blood Pressure Reading    /82 mmHg.  Most likely secondary to steroid use.  Patient has no cardiopulmonary symptoms, but complaining of slight dizziness.    Provided with BP log, patient will start monitoring BP at home.  She will return to care if BP remains above 120/80 after discontinuation of the steroids.          Medication Side Effect    Elevated BP secondary to steroid use.       Thrombocytopenia (Hcc)    Patient has history of thrombocytopenia, followed by Dr. Casey.  Note reviewed: Mild thrombocytopenia, likely immune thrombocytopenia.  Follow-up with hematology q 6 mo     Lab Results   Component Value Date/Time    WBC 4.1 (L) 07/12/2023 07:53 AM    RBC 4.02 (L) 07/12/2023 07:53 AM    HEMOGLOBIN 13.1 07/12/2023 07:53 AM    HEMATOCRIT 39.4 07/12/2023 07:53 AM    MCV 98.0 (H) 07/12/2023 07:53 AM    MCH 32.6 07/12/2023 07:53 AM    MCHC 33.2 07/12/2023 07:53  "AM    MPV 11.1 07/12/2023 07:53 AM             Past Medical History:   Diagnosis Date    Hip fracture requiring operative repair, right, closed, with routine healing, subsequent encounter 5/3/2022    Patient denies medical problems     Thrombocytopenia (HCC)        Current Outpatient Medications Ordered in Epic   Medication Sig Dispense Refill    methylPREDNISolone (MEDROL DOSEPAK) 4 MG Tablet Therapy Pack Take 1 Tablet by mouth every day. Follow schedule on package instructions. 21 Tablet 0    alendronate (FOSAMAX) 70 MG Tab Take 1 Tablet by mouth every 7 days. 12 Tablet 3    CALCIUM-VITAMIN D PO       Multiple Vitamins-Minerals (MULTIVITAMIN WOMEN 50+) Tab Take 1 Tablet by mouth every morning.      vitamin D3 (CHOLECALCIFEROL) 1000 Unit (25 mcg) Tab Take 1,000 Units by mouth every morning.       No current [facility]-ordered facility-administered medications on file.     Review of Systems   Cardiovascular:  Negative for chest pain.   Skin:  Negative for itching and rash.     Objective:     Exam:  BP (!) 144/82 (BP Location: Left arm, Patient Position: Sitting, BP Cuff Size: Adult)   Pulse 64   Temp 37 °C (98.6 °F) (Temporal)   Ht 1.702 m (5' 7\")   Wt 86 kg (189 lb 9.5 oz)   SpO2 97%   BMI 29.69 kg/m²  Body mass index is 29.69 kg/m².    Physical Exam  Constitutional:       Appearance: Normal appearance.   HENT:      Head: Normocephalic and atraumatic.   Skin:     General: Skin is warm and moist.      Capillary Refill: Capillary refill takes less than 2 seconds.      Findings: Rash is not urticarial.   Neurological:      Mental Status: She is alert.     External notes: Reviewed emergency room note from 12/21/2023, urgent care note from 12/23/2023  Assessment & Plan:   Carmita  is a pleasant 73 y.o. female with the following -     Problem List Items Addressed This Visit       Thrombocytopenia (HCC) (Chronic)     Chronic, stable, follows with Dr. Casey every 6 months.    Obtain CBC in the settings of recent         " Relevant Orders    CBC WITH DIFFERENTIAL    Elevated blood pressure reading     Follow-up if BP runs above 120/80 at home         Medication side effect    Urticaria     Continue steroids as prescribed, switch to nondrowsy antihistamines if needed after steroids are completed.            Return if symptoms worsen or fail to improve.    Please note that this dictation was created using voice recognition software. I have made every reasonable attempt to correct obvious errors, but I expect that there are errors of grammar and possibly content that I did not discover before finalizing the note.

## 2024-01-04 ENCOUNTER — HOSPITAL ENCOUNTER (OUTPATIENT)
Dept: LAB | Facility: MEDICAL CENTER | Age: 74
End: 2024-01-04
Attending: INTERNAL MEDICINE
Payer: MEDICARE

## 2024-01-04 DIAGNOSIS — D69.6 THROMBOCYTOPENIA (HCC): Chronic | ICD-10-CM

## 2024-01-04 LAB
BASOPHILS # BLD AUTO: 0.6 % (ref 0–1.8)
BASOPHILS # BLD: 0.03 K/UL (ref 0–0.12)
EOSINOPHIL # BLD AUTO: 0.05 K/UL (ref 0–0.51)
EOSINOPHIL NFR BLD: 1 % (ref 0–6.9)
ERYTHROCYTE [DISTWIDTH] IN BLOOD BY AUTOMATED COUNT: 50.3 FL (ref 35.9–50)
HCT VFR BLD AUTO: 39.2 % (ref 37–47)
HGB BLD-MCNC: 13.1 G/DL (ref 12–16)
IMM GRANULOCYTES # BLD AUTO: 0.01 K/UL (ref 0–0.11)
IMM GRANULOCYTES NFR BLD AUTO: 0.2 % (ref 0–0.9)
LYMPHOCYTES # BLD AUTO: 2.34 K/UL (ref 1–4.8)
LYMPHOCYTES NFR BLD: 45.2 % (ref 22–41)
MCH RBC QN AUTO: 32.3 PG (ref 27–33)
MCHC RBC AUTO-ENTMCNC: 33.4 G/DL (ref 32.2–35.5)
MCV RBC AUTO: 96.6 FL (ref 81.4–97.8)
MONOCYTES # BLD AUTO: 0.44 K/UL (ref 0–0.85)
MONOCYTES NFR BLD AUTO: 8.5 % (ref 0–13.4)
NEUTROPHILS # BLD AUTO: 2.31 K/UL (ref 1.82–7.42)
NEUTROPHILS NFR BLD: 44.5 % (ref 44–72)
NRBC # BLD AUTO: 0 K/UL
NRBC BLD-RTO: 0 /100 WBC (ref 0–0.2)
PLATELET # BLD AUTO: 71 K/UL (ref 164–446)
PLATELETS.RETICULATED NFR BLD AUTO: 3.7 % (ref 0.6–13.1)
PMV BLD AUTO: 10.3 FL (ref 9–12.9)
RBC # BLD AUTO: 4.06 M/UL (ref 4.2–5.4)
WBC # BLD AUTO: 5.2 K/UL (ref 4.8–10.8)

## 2024-01-04 PROCEDURE — 36415 COLL VENOUS BLD VENIPUNCTURE: CPT

## 2024-01-04 PROCEDURE — 85025 COMPLETE CBC W/AUTO DIFF WBC: CPT

## 2024-01-04 PROCEDURE — 85055 RETICULATED PLATELET ASSAY: CPT

## 2024-02-28 ENCOUNTER — APPOINTMENT (OUTPATIENT)
Dept: MEDICAL GROUP | Facility: MEDICAL CENTER | Age: 74
End: 2024-02-28
Payer: MEDICARE

## 2024-06-21 ENCOUNTER — OFFICE VISIT (OUTPATIENT)
Dept: URGENT CARE | Facility: CLINIC | Age: 74
End: 2024-06-21
Payer: MEDICARE

## 2024-06-21 VITALS
SYSTOLIC BLOOD PRESSURE: 138 MMHG | BODY MASS INDEX: 29.66 KG/M2 | DIASTOLIC BLOOD PRESSURE: 78 MMHG | OXYGEN SATURATION: 97 % | RESPIRATION RATE: 14 BRPM | WEIGHT: 189 LBS | TEMPERATURE: 98.3 F | HEART RATE: 89 BPM | HEIGHT: 67 IN

## 2024-06-21 DIAGNOSIS — U07.1 COVID-19: ICD-10-CM

## 2024-06-21 LAB
FLUAV RNA SPEC QL NAA+PROBE: NEGATIVE
FLUBV RNA SPEC QL NAA+PROBE: NEGATIVE
RSV RNA SPEC QL NAA+PROBE: NEGATIVE
SARS-COV-2 RNA RESP QL NAA+PROBE: POSITIVE

## 2024-06-21 PROCEDURE — 99214 OFFICE O/P EST MOD 30 MIN: CPT | Performed by: STUDENT IN AN ORGANIZED HEALTH CARE EDUCATION/TRAINING PROGRAM

## 2024-06-21 PROCEDURE — 3078F DIAST BP <80 MM HG: CPT | Performed by: STUDENT IN AN ORGANIZED HEALTH CARE EDUCATION/TRAINING PROGRAM

## 2024-06-21 PROCEDURE — 0241U POCT CEPHEID COV-2, FLU A/B, RSV - PCR: CPT | Performed by: STUDENT IN AN ORGANIZED HEALTH CARE EDUCATION/TRAINING PROGRAM

## 2024-06-21 PROCEDURE — 3075F SYST BP GE 130 - 139MM HG: CPT | Performed by: STUDENT IN AN ORGANIZED HEALTH CARE EDUCATION/TRAINING PROGRAM

## 2024-06-21 RX ORDER — AMOXICILLIN 500 MG/1
TABLET, FILM COATED ORAL
COMMUNITY
Start: 2024-04-15

## 2024-06-21 RX ORDER — CHLORHEXIDINE GLUCONATE ORAL RINSE 1.2 MG/ML
SOLUTION DENTAL
COMMUNITY
Start: 2024-04-15

## 2024-06-21 RX ORDER — HYDROCODONE BITARTRATE AND ACETAMINOPHEN 5; 325 MG/1; MG/1
TABLET ORAL
COMMUNITY
Start: 2024-04-15

## 2024-06-21 ASSESSMENT — FIBROSIS 4 INDEX: FIB4 SCORE: 4.42

## 2024-06-22 ENCOUNTER — TELEPHONE (OUTPATIENT)
Dept: URGENT CARE | Facility: CLINIC | Age: 74
End: 2024-06-22

## 2024-06-22 NOTE — PROGRESS NOTES
Subjective:   Carmita Miller is a 74 y.o. female who presents for Cough (X 3 day/ Sore throat/ foggy brain/ body aches/ nasal congestion/ bad immune system wants to get checked up )      HPI:  74-year-old female presents to the urgent care for 3 days of sore throat, nasal congestion, runny nose, cough, and fatigue.  No recorded fever, nausea, vomiting, chest pain, shortness of breath, ear pain, dizziness, or headache.  Able to maintain adequate intake of fluids and solids.    Medications:    alendronate Tabs  Amoxicillin Tabs  CALCIUM-VITAMIN D PO  chlorhexidine Soln  HYDROcodone-acetaminophen Tabs  methylPREDNISolone Tbpk  Multivitamin Women 50+ Tabs  vitamin D3 Tabs    Allergies: Ibuprofen, Seasonal, and Western juniper    Problem List: Carmita Miller does not have any pertinent problems on file.    Surgical History:  Past Surgical History:   Procedure Laterality Date    ORIF, HIP Right 04/29/2022    Procedure: ORIF, HIP, SPLINTING RIGHT WRIST;  Surgeon: Cassius Zuniga M.D.;  Location: SURGERY [facility];  Service: Orthopedics    TONSILLECTOMY         Past Social Hx: Carmita Miller  reports that she quit smoking about 11 years ago. Her smoking use included cigarettes. She started smoking about 31 years ago. She has a 5 pack-year smoking history. She has never used smokeless tobacco. She reports current alcohol use. She reports that she does not currently use drugs.     Past Family Hx:  Carmita Miller family history includes Anemia in her maternal uncle; Cancer in her brother, father, mother, paternal aunt, paternal aunt, paternal grandfather, paternal uncle, and sister; Heart Disease in her father, maternal grandfather, maternal grandmother, and sister; Ovarian Cancer in an other family member; Rheumatologic Disease in her mother.     Problem list, medications, and allergies reviewed by myself today in Epic.     Objective:     /78   Pulse 89   Temp 36.8 °C (98.3 °F)   Resp 14   Ht 1.702 m (5'  "7\")   Wt 85.7 kg (189 lb)   SpO2 97%   BMI 29.60 kg/m²     Physical Exam  Vitals reviewed.   HENT:      Head: Normocephalic.      Right Ear: Tympanic membrane, ear canal and external ear normal.      Left Ear: Tympanic membrane, ear canal and external ear normal.      Nose: Congestion present.      Mouth/Throat:      Mouth: Mucous membranes are moist.      Pharynx: Posterior oropharyngeal erythema present. No oropharyngeal exudate.   Cardiovascular:      Rate and Rhythm: Normal rate and regular rhythm.      Pulses: Normal pulses.      Heart sounds: Normal heart sounds. No murmur heard.  Pulmonary:      Effort: Pulmonary effort is normal. No respiratory distress.      Breath sounds: Normal breath sounds. No stridor. No wheezing, rhonchi or rales.         Assessment/Plan:     Diagnosis and associated orders:     1. COVID-19  POCT CoV-2, Flu A/B, RSV by PCR         Comments/MDM:     PCR COVID-19 positive.  Symptomology consistent with this diagnosis.  Pulmonary exam shows no wheezing, rales, rhonchi.  No signs of pneumonia.  Physical exam not consistent with strep throat.  Vitals all within normal limits.  No respiratory distress or chest pain.  Discussed typical timeframe for resolution of symptoms.  Discussed home supportive care.  Discussed isolation guidelines.  Return precautions given.         Differential diagnosis, natural history, supportive care, and indications for immediate follow-up discussed.    Advised the patient to follow-up with the primary care physician for recheck, reevaluation, and consideration of further management.    Please note that this dictation was created using voice recognition software. I have made a reasonable attempt to correct obvious errors, but I expect that there are errors of grammar and possibly content that I did not discover before finalizing the note.    Electronically signed by Cassius Hartley PA-C.      "

## 2024-07-15 ENCOUNTER — PATIENT MESSAGE (OUTPATIENT)
Dept: MEDICAL GROUP | Facility: MEDICAL CENTER | Age: 74
End: 2024-07-15
Payer: MEDICARE

## 2024-11-27 ENCOUNTER — HOSPITAL ENCOUNTER (OUTPATIENT)
Dept: RADIOLOGY | Facility: MEDICAL CENTER | Age: 74
End: 2024-11-27
Attending: INTERNAL MEDICINE
Payer: MEDICARE

## 2024-11-27 DIAGNOSIS — D69.3 IMMUNE THROMBOCYTOPENIC PURPURA (HCC): ICD-10-CM

## 2024-11-27 DIAGNOSIS — Z01.89 ENCOUNTER FOR OTHER SPECIFIED SPECIAL EXAMINATIONS: ICD-10-CM

## 2024-11-27 DIAGNOSIS — D69.6 THROMBOCYTOPENIA, UNSPECIFIED (HCC): ICD-10-CM

## 2024-11-27 DIAGNOSIS — E04.1 NONTOXIC SINGLE THYROID NODULE: ICD-10-CM

## 2024-11-27 PROCEDURE — 76536 US EXAM OF HEAD AND NECK: CPT

## 2025-01-23 ENCOUNTER — OFFICE VISIT (OUTPATIENT)
Dept: MEDICAL GROUP | Age: 75
End: 2025-01-23
Payer: MEDICARE

## 2025-01-23 VITALS
TEMPERATURE: 97.6 F | SYSTOLIC BLOOD PRESSURE: 104 MMHG | HEIGHT: 66 IN | RESPIRATION RATE: 16 BRPM | WEIGHT: 199 LBS | DIASTOLIC BLOOD PRESSURE: 54 MMHG | HEART RATE: 60 BPM | OXYGEN SATURATION: 99 % | BODY MASS INDEX: 31.98 KG/M2

## 2025-01-23 DIAGNOSIS — Z12.31 ENCOUNTER FOR SCREENING MAMMOGRAM FOR BREAST CANCER: ICD-10-CM

## 2025-01-23 DIAGNOSIS — I89.0 LYMPHEDEMA: ICD-10-CM

## 2025-01-23 DIAGNOSIS — E78.5 HYPERLIPIDEMIA, UNSPECIFIED HYPERLIPIDEMIA TYPE: ICD-10-CM

## 2025-01-23 DIAGNOSIS — R60.0 BILATERAL LOWER EXTREMITY EDEMA: ICD-10-CM

## 2025-01-23 DIAGNOSIS — Z13.29 THYROID DISORDER SCREEN: ICD-10-CM

## 2025-01-23 DIAGNOSIS — D69.6 THROMBOCYTOPENIA (HCC): Chronic | ICD-10-CM

## 2025-01-23 DIAGNOSIS — E55.9 VITAMIN D DEFICIENCY: ICD-10-CM

## 2025-01-23 DIAGNOSIS — Z79.899 HIGH RISK MEDICATION USE: ICD-10-CM

## 2025-01-23 DIAGNOSIS — E04.2 MULTIPLE THYROID NODULES: ICD-10-CM

## 2025-01-23 PROCEDURE — 3078F DIAST BP <80 MM HG: CPT | Performed by: INTERNAL MEDICINE

## 2025-01-23 PROCEDURE — 99214 OFFICE O/P EST MOD 30 MIN: CPT | Performed by: INTERNAL MEDICINE

## 2025-01-23 PROCEDURE — 3074F SYST BP LT 130 MM HG: CPT | Performed by: INTERNAL MEDICINE

## 2025-01-23 RX ORDER — ELTROMBOPAG OLAMINE 75 MG/1
TABLET, FILM COATED ORAL
COMMUNITY
Start: 2025-01-06

## 2025-01-23 RX ORDER — KRILL/OM-3/DHA/EPA/PHOSPHO/AST 500MG-86MG
CAPSULE ORAL
COMMUNITY
Start: 2023-09-30

## 2025-01-23 ASSESSMENT — FIBROSIS 4 INDEX: FIB4 SCORE: 4.48

## 2025-01-23 ASSESSMENT — PATIENT HEALTH QUESTIONNAIRE - PHQ9: CLINICAL INTERPRETATION OF PHQ2 SCORE: 0

## 2025-01-23 ASSESSMENT — ENCOUNTER SYMPTOMS: BRUISES/BLEEDS EASILY: 1

## 2025-01-23 NOTE — PROGRESS NOTES
CC:   Chief Complaint   Patient presents with    Follow-Up     Update on ITP, CT     Leg Swelling     Legs, feet, ankles    Thyroid Follow-up     Diagnoses of Thrombocytopenia (HCC), Multiple thyroid nodules, Hyperlipidemia, unspecified hyperlipidemia type, Vitamin D deficiency, Thyroid disorder screen, High risk medication use, Encounter for screening mammogram for breast cancer, Bilateral lower extremity edema, and Lymphedema were pertinent to this visit.  Verbal consent was acquired by the patient to use [employer] ambient listening note generation during this visit Yes     History of Present Illness  Carmita is a pleasant 75 y.o. female with a history of osteoporosis, immune thrombocytopenia, presenting today for a follow-up visit. She was last seen in December 2023. She is accompanied by her  Anthony.    She has been under the care of Dr. Casey for her platelet count, currently managed with Promacta 75 mg. Her platelet counts have been recorded as low as 31 and 28. She has been advised to monitor her ALT levels due to the potential hepatotoxic effects of Promacta. Additionally, her absolute neutrophil count is being tracked, which remains low. Despite these measures, her condition does not seem to be improving. She was previously on a 50 mg dose of Promacta in early December 2023, which was ineffective.     She has been experiencing bilateral lower extremity edema, which she suspects may be a side effect of Promacta. She initiated the use of compression socks this morning. She has a history of similar issues years ago. She acknowledges a weight gain of 10 pounds since Christmas, attributed to decreased physical activity. She has been advised to engage in walking exercises. She reports no paresthesias.     She has a history of varicose veins, possibly related to leg crossing. She typically wears tennis shoes for walking but occasionally resorts to sandals for short distances. She has experienced difficulty  "in obtaining blood samples due to small veins.    She underwent a CT scan and ultrasound of her thyroid less than a month ago, which revealed 3 nodules.     Her last mammogram was conducted in 2022. She had a Cologuard test in 2023, which yielded negative results.    FAMILY HISTORY  Her mother had lymphedema    Past Medical History:   Diagnosis Date    Hip fracture requiring operative repair, right, closed, with routine healing, subsequent encounter 5/3/2022    Patient denies medical problems     Thrombocytopenia (HCC)      Current Outpatient Medications Ordered in Epic   Medication Sig Dispense Refill    PROMACTA 75 MG Tab       Multiple Vitamins-Minerals (PRESERVISION AREDS 2 PO)       Krill Oil 500 MG Cap       alendronate (FOSAMAX) 70 MG Tab Take 1 Tablet by mouth every 7 days. (Patient not taking: Reported on 6/21/2024) 12 Tablet 3    Multiple Vitamins-Minerals (MULTIVITAMIN WOMEN 50+) Tab Take 1 Tablet by mouth every morning.       No current [facility]-ordered facility-administered medications on file.     Review of Systems   Cardiovascular:  Positive for leg swelling.   Endo/Heme/Allergies:  Bruises/bleeds easily.   All other systems reviewed and are negative.      Objective:     Exam:  /54 (BP Location: Right arm, Patient Position: Sitting, BP Cuff Size: Large adult)   Pulse 60   Temp 36.4 °C (97.6 °F) (Temporal)   Resp 16   Ht 1.685 m (5' 6.34\")   Wt 90.3 kg (199 lb)   SpO2 99%   BMI 31.79 kg/m²  Body mass index is 31.79 kg/m².    Physical Exam  Constitutional:       Appearance: Normal appearance.   HENT:      Head: Normocephalic and atraumatic.   Pulmonary:      Effort: Pulmonary effort is normal. No respiratory distress.   Neurological:      Mental Status: She is alert and oriented to person, place, and time.   Psychiatric:         Mood and Affect: Mood normal.         Behavior: Behavior normal.         Thought Content: Thought content normal.         Judgment: Judgment normal. "           Results:  Results  Laboratory Studies  Platelet count was 31 and 28. Absolute neutrophil count within range.    11/27/2024 1:52 PM     HISTORY/REASON FOR EXAM:  Incidental finding of thyroid nodules on CT.     TECHNIQUE/EXAM DESCRIPTION:  Ultrasound of the soft tissues of the head and neck.     COMPARISON:  None     FINDINGS:  The thyroid gland is heterogeneous.  Vascularity is slightly increased.     The right lobe of the thyroid gland measures 2.49 cm x 5.35 cm x 3.24 cm.  The left lobe of the thyroid gland measures 1.26 cm x 4.99 cm x 1.24 cm.  The isthmus measures 0.15 cm.     Nodules >= 1cm:        Nodule #1  Location:  Right  mid  Size:  3.19 x 2.74 x 1.69 cm  Composition:  Mixed-1  Echogenicity:  Isoechoic-1  Shape:  Wider than tall-0  Margins:  Smooth-0  Echogenic Foci:  None-0     ACR TIRADS points/category:  2 - TR2 - Not Suspicious     Nodule #2  Location:  Right  lower  Size:  1.34 x 1.38 x 0.97 cm  Composition:  Mixed-1  Echogenicity:  Isoechoic-1  Shape:  Wider than tall-0  Margins:  Smooth-0  Echogenic Foci:  None-0     ACR TIRADS points/category:  2 - TR2 - Not Suspicious     Nodule #3  Location:  Right  lower  Size:  1.4 x 1.43 x 0.77 cm  Composition:  Solid-2  Echogenicity:  Isoechoic-1  Shape:  Wider than tall-0  Margins:  Smooth-0  Echogenic Foci:  None-0     ACR TIRADS points/category:  3 - TR3 - Mildly Suspicious     IMPRESSION:     1.  Several right thyroid nodules. They do not meet criteria for biopsy or follow-up.  2.  Slightly increased thyroid vascularity. Please correlate for thyroiditis.     ACR TI-RADS Recommendations  TR3 - No FNA or follow up recommended.       Assessment & Plan:   Carmita  is a pleasant 75 y.o. female with the following -   Assessment & Plan  1. Immune thrombocytopenic purpura.  She is currently on Promacta 75 mg for her platelet count, which recently dropped to 31 and 28. She was previously on 50 mg, which was ineffective. She will continue with the current  dosage and follow up with her hematologist on the 27th. Blood tests, including cholesterol, vitamin D, and thyroid labs, have been ordered to monitor her condition.    2. Bilateral lower extremity edema.  She reports significant swelling in her ankles, which may be related to Promacta?. She has been advised to wear compression socks daily, elevate her feet during rest, and avoid prolonged standing or sitting. Walking is recommended to improve circulation. An ultrasound of her veins has been ordered to rule out blood clots. A referral to physical therapy has been made to address the swelling.    3. Thyroid nodules.  She has 3 thyroid nodules, 2 of which are not suspicious and 1 mildly suspicious. A repeat ultrasound will be conducted in 6 months to monitor any changes. If the nodules change or grow, a referral to an endocrinologist will be considered.     4. Health maintenance.  A mammogram has been ordered as it has been over 2 years since her last one. Blood tests, including cholesterol, vitamin D, and thyroid labs, have been ordered.    Problem List Items Addressed This Visit       Hyperlipidemia    Relevant Orders    Lipid Profile    Thrombocytopenia (HCC) (Chronic)     Other Visit Diagnoses       Multiple thyroid nodules        Relevant Orders    TSH WITH REFLEX TO FT4    US-THYROID    Vitamin D deficiency        Relevant Orders    VITAMIN D,25 HYDROXY (DEFICIENCY)    Thyroid disorder screen        Relevant Orders    TSH WITH REFLEX TO FT4    High risk medication use        Relevant Orders    CBC WITH DIFFERENTIAL    Comp Metabolic Panel    Encounter for screening mammogram for breast cancer        Relevant Orders    MA-SCREENING MAMMO BILAT W/TOMOSYNTHESIS W/CAD    Bilateral lower extremity edema        Relevant Orders    Referral to Physical Therapy    US-EXTREMITY VENOUS LOWER BILAT    Lymphedema        Relevant Orders    Referral to Physical Therapy          3-4- weeks labs   Please note that this dictation  was created using voice recognition software. I have made every reasonable attempt to correct obvious errors, but I expect that there are errors of grammar and possibly content that I did not discover before finalizing the note.

## 2025-01-24 ENCOUNTER — HOSPITAL ENCOUNTER (OUTPATIENT)
Dept: RADIOLOGY | Facility: MEDICAL CENTER | Age: 75
End: 2025-01-24
Attending: INTERNAL MEDICINE
Payer: MEDICARE

## 2025-01-24 DIAGNOSIS — R60.0 BILATERAL LOWER EXTREMITY EDEMA: ICD-10-CM

## 2025-01-24 PROCEDURE — 93970 EXTREMITY STUDY: CPT

## 2025-01-28 ENCOUNTER — HOSPITAL ENCOUNTER (OUTPATIENT)
Dept: RADIOLOGY | Facility: MEDICAL CENTER | Age: 75
End: 2025-01-28
Attending: INTERNAL MEDICINE
Payer: MEDICARE

## 2025-01-28 DIAGNOSIS — Z12.31 ENCOUNTER FOR SCREENING MAMMOGRAM FOR BREAST CANCER: ICD-10-CM

## 2025-01-28 PROCEDURE — 77067 SCR MAMMO BI INCL CAD: CPT

## 2025-02-10 ENCOUNTER — HOSPITAL ENCOUNTER (OUTPATIENT)
Dept: LAB | Facility: MEDICAL CENTER | Age: 75
End: 2025-02-10
Attending: INTERNAL MEDICINE
Payer: MEDICARE

## 2025-02-10 DIAGNOSIS — E78.5 HYPERLIPIDEMIA, UNSPECIFIED HYPERLIPIDEMIA TYPE: ICD-10-CM

## 2025-02-10 DIAGNOSIS — E04.2 MULTIPLE THYROID NODULES: ICD-10-CM

## 2025-02-10 DIAGNOSIS — Z13.29 THYROID DISORDER SCREEN: ICD-10-CM

## 2025-02-10 DIAGNOSIS — Z79.899 HIGH RISK MEDICATION USE: ICD-10-CM

## 2025-02-10 DIAGNOSIS — E55.9 VITAMIN D DEFICIENCY: ICD-10-CM

## 2025-02-10 LAB
BASOPHILS # BLD AUTO: 0 % (ref 0–1.8)
BASOPHILS # BLD: 0 K/UL (ref 0–0.12)
EOSINOPHIL # BLD AUTO: 0.02 K/UL (ref 0–0.51)
EOSINOPHIL NFR BLD: 0.5 % (ref 0–6.9)
ERYTHROCYTE [DISTWIDTH] IN BLOOD BY AUTOMATED COUNT: 53.2 FL (ref 35.9–50)
HCT VFR BLD AUTO: 36 % (ref 37–47)
HGB BLD-MCNC: 11.6 G/DL (ref 12–16)
IMM GRANULOCYTES # BLD AUTO: 0.01 K/UL (ref 0–0.11)
IMM GRANULOCYTES NFR BLD AUTO: 0.3 % (ref 0–0.9)
LYMPHOCYTES # BLD AUTO: 1.8 K/UL (ref 1–4.8)
LYMPHOCYTES NFR BLD: 46 % (ref 22–41)
MCH RBC QN AUTO: 33.7 PG (ref 27–33)
MCHC RBC AUTO-ENTMCNC: 32.2 G/DL (ref 32.2–35.5)
MCV RBC AUTO: 104.7 FL (ref 81.4–97.8)
MONOCYTES # BLD AUTO: 0.52 K/UL (ref 0–0.85)
MONOCYTES NFR BLD AUTO: 13.3 % (ref 0–13.4)
NEUTROPHILS # BLD AUTO: 1.56 K/UL (ref 1.82–7.42)
NEUTROPHILS NFR BLD: 39.9 % (ref 44–72)
NRBC # BLD AUTO: 0 K/UL
NRBC BLD-RTO: 0 /100 WBC (ref 0–0.2)
PLATELET # BLD AUTO: 26 K/UL (ref 164–446)
PLATELETS.RETICULATED NFR BLD AUTO: 4.5 % (ref 0.6–13.1)
PMV BLD AUTO: 13.2 FL (ref 9–12.9)
RBC # BLD AUTO: 3.44 M/UL (ref 4.2–5.4)
WBC # BLD AUTO: 3.9 K/UL (ref 4.8–10.8)

## 2025-02-10 PROCEDURE — 82306 VITAMIN D 25 HYDROXY: CPT

## 2025-02-10 PROCEDURE — 84443 ASSAY THYROID STIM HORMONE: CPT

## 2025-02-10 PROCEDURE — 85025 COMPLETE CBC W/AUTO DIFF WBC: CPT

## 2025-02-10 PROCEDURE — 36415 COLL VENOUS BLD VENIPUNCTURE: CPT

## 2025-02-10 PROCEDURE — 85055 RETICULATED PLATELET ASSAY: CPT

## 2025-02-10 PROCEDURE — 80061 LIPID PANEL: CPT

## 2025-02-10 PROCEDURE — 80053 COMPREHEN METABOLIC PANEL: CPT

## 2025-02-11 LAB
25(OH)D3 SERPL-MCNC: 23 NG/ML (ref 30–100)
ALBUMIN SERPL BCP-MCNC: 4.2 G/DL (ref 3.2–4.9)
ALBUMIN/GLOB SERPL: 1.8 G/DL
ALP SERPL-CCNC: 84 U/L (ref 30–99)
ALT SERPL-CCNC: 18 U/L (ref 2–50)
ANION GAP SERPL CALC-SCNC: 12 MMOL/L (ref 7–16)
AST SERPL-CCNC: 22 U/L (ref 12–45)
BILIRUB SERPL-MCNC: 0.5 MG/DL (ref 0.1–1.5)
BUN SERPL-MCNC: 15 MG/DL (ref 8–22)
CALCIUM ALBUM COR SERPL-MCNC: 9.5 MG/DL (ref 8.5–10.5)
CALCIUM SERPL-MCNC: 9.7 MG/DL (ref 8.5–10.5)
CHLORIDE SERPL-SCNC: 105 MMOL/L (ref 96–112)
CHOLEST SERPL-MCNC: 221 MG/DL (ref 100–199)
CO2 SERPL-SCNC: 23 MMOL/L (ref 20–33)
CREAT SERPL-MCNC: 0.73 MG/DL (ref 0.5–1.4)
FASTING STATUS PATIENT QL REPORTED: NORMAL
GFR SERPLBLD CREATININE-BSD FMLA CKD-EPI: 87 ML/MIN/1.73 M 2
GLOBULIN SER CALC-MCNC: 2.3 G/DL (ref 1.9–3.5)
GLUCOSE SERPL-MCNC: 88 MG/DL (ref 65–99)
HDLC SERPL-MCNC: 56 MG/DL
LDLC SERPL CALC-MCNC: 148 MG/DL
POTASSIUM SERPL-SCNC: 4.9 MMOL/L (ref 3.6–5.5)
PROT SERPL-MCNC: 6.5 G/DL (ref 6–8.2)
SODIUM SERPL-SCNC: 140 MMOL/L (ref 135–145)
TRIGL SERPL-MCNC: 86 MG/DL (ref 0–149)
TSH SERPL DL<=0.005 MIU/L-ACNC: 1.63 UIU/ML (ref 0.38–5.33)

## 2025-02-13 ENCOUNTER — RESULTS FOLLOW-UP (OUTPATIENT)
Dept: MEDICAL GROUP | Age: 75
End: 2025-02-13

## 2025-04-11 ENCOUNTER — APPOINTMENT (OUTPATIENT)
Dept: ADMISSIONS | Facility: MEDICAL CENTER | Age: 75
End: 2025-04-11
Attending: INTERNAL MEDICINE
Payer: MEDICARE

## 2025-04-15 ENCOUNTER — HOSPITAL ENCOUNTER (OUTPATIENT)
Facility: MEDICAL CENTER | Age: 75
End: 2025-04-15
Attending: INTERNAL MEDICINE | Admitting: INTERNAL MEDICINE
Payer: MEDICARE

## 2025-04-15 VITALS
WEIGHT: 199.3 LBS | OXYGEN SATURATION: 95 % | BODY MASS INDEX: 31.28 KG/M2 | SYSTOLIC BLOOD PRESSURE: 132 MMHG | HEIGHT: 67 IN | TEMPERATURE: 98.6 F | HEART RATE: 52 BPM | DIASTOLIC BLOOD PRESSURE: 60 MMHG | RESPIRATION RATE: 20 BRPM

## 2025-04-15 DIAGNOSIS — D61.818 PANCYTOPENIA (HCC): ICD-10-CM

## 2025-04-15 DIAGNOSIS — D69.6 THROMBOCYTOPENIA (HCC): Chronic | ICD-10-CM

## 2025-04-15 LAB
BASOPHILS # BLD AUTO: 0 % (ref 0–1.8)
BASOPHILS # BLD: 0 K/UL (ref 0–0.12)
EOSINOPHIL # BLD AUTO: 0 K/UL (ref 0–0.51)
EOSINOPHIL NFR BLD: 0 % (ref 0–6.9)
ERYTHROCYTE [DISTWIDTH] IN BLOOD BY AUTOMATED COUNT: 54.9 FL (ref 35.9–50)
HCT VFR BLD AUTO: 28.8 % (ref 37–47)
HGB BLD-MCNC: 9.5 G/DL (ref 12–16)
HGB RETIC QN AUTO: 40.1 PG/CELL (ref 29–35)
IMM RETICS NFR: 11.9 % (ref 2.6–16.1)
LYMPHOCYTES # BLD AUTO: 1.97 K/UL (ref 1–4.8)
LYMPHOCYTES NFR BLD: 82.1 % (ref 22–41)
MANUAL DIFF BLD: NORMAL
MCH RBC QN AUTO: 33.3 PG (ref 27–33)
MCHC RBC AUTO-ENTMCNC: 33 G/DL (ref 32.2–35.5)
MCV RBC AUTO: 101.1 FL (ref 81.4–97.8)
MONOCYTES # BLD AUTO: 0.09 K/UL (ref 0–0.85)
MONOCYTES NFR BLD AUTO: 3.6 % (ref 0–13.4)
MORPHOLOGY BLD-IMP: NORMAL
NEUTROPHILS # BLD AUTO: 0.34 K/UL (ref 1.82–7.42)
NEUTROPHILS NFR BLD: 14.3 % (ref 44–72)
NRBC # BLD AUTO: 0 K/UL
NRBC BLD-RTO: 0 /100 WBC (ref 0–0.2)
OVALOCYTES BLD QL SMEAR: NORMAL
PATHOLOGY CONSULT NOTE: NORMAL
PLATELET # BLD AUTO: 46 K/UL (ref 164–446)
PLATELET BLD QL SMEAR: NORMAL
PLATELETS.RETICULATED NFR BLD AUTO: 2.6 % (ref 0.6–13.1)
PMV BLD AUTO: 11.3 FL (ref 9–12.9)
POIKILOCYTOSIS BLD QL SMEAR: NORMAL
RBC # BLD AUTO: 2.85 M/UL (ref 4.2–5.4)
RBC BLD AUTO: PRESENT
RETICS # AUTO: 0.03 M/UL (ref 0.04–0.12)
RETICS/RBC NFR: 0.9 % (ref 0.8–2.6)
WBC # BLD AUTO: 2.4 K/UL (ref 4.8–10.8)

## 2025-04-15 PROCEDURE — 160048 HCHG OR STATISTICAL LEVEL 1-5: Performed by: STUDENT IN AN ORGANIZED HEALTH CARE EDUCATION/TRAINING PROGRAM

## 2025-04-15 PROCEDURE — 99152 MOD SED SAME PHYS/QHP 5/>YRS: CPT | Performed by: STUDENT IN AN ORGANIZED HEALTH CARE EDUCATION/TRAINING PROGRAM

## 2025-04-15 PROCEDURE — 160046 HCHG PACU - 1ST 60 MINS PHASE II: Performed by: STUDENT IN AN ORGANIZED HEALTH CARE EDUCATION/TRAINING PROGRAM

## 2025-04-15 PROCEDURE — 88185 FLOWCYTOMETRY/TC ADD-ON: CPT

## 2025-04-15 PROCEDURE — 88311 DECALCIFY TISSUE: CPT | Mod: 26 | Performed by: PATHOLOGY

## 2025-04-15 PROCEDURE — 99999 PR NO CHARGE: CPT | Mod: 91 | Performed by: PATHOLOGY

## 2025-04-15 PROCEDURE — 160038 HCHG SURGERY MINUTES - EA ADDL 1 MIN LEVEL 2: Performed by: STUDENT IN AN ORGANIZED HEALTH CARE EDUCATION/TRAINING PROGRAM

## 2025-04-15 PROCEDURE — 88189 FLOWCYTOMETRY/READ 16 & >: CPT | Performed by: PATHOLOGY

## 2025-04-15 PROCEDURE — 88313 SPECIAL STAINS GROUP 2: CPT | Mod: 26 | Performed by: PATHOLOGY

## 2025-04-15 PROCEDURE — 85097 BONE MARROW INTERPRETATION: CPT | Performed by: PATHOLOGY

## 2025-04-15 PROCEDURE — 160025 RECOVERY II MINUTES (STATS): Performed by: STUDENT IN AN ORGANIZED HEALTH CARE EDUCATION/TRAINING PROGRAM

## 2025-04-15 PROCEDURE — 85027 COMPLETE CBC AUTOMATED: CPT

## 2025-04-15 PROCEDURE — 88313 SPECIAL STAINS GROUP 2: CPT | Performed by: PATHOLOGY

## 2025-04-15 PROCEDURE — 99153 MOD SED SAME PHYS/QHP EA: CPT | Performed by: STUDENT IN AN ORGANIZED HEALTH CARE EDUCATION/TRAINING PROGRAM

## 2025-04-15 PROCEDURE — 160015 HCHG STAT PREOP MINUTES: Performed by: STUDENT IN AN ORGANIZED HEALTH CARE EDUCATION/TRAINING PROGRAM

## 2025-04-15 PROCEDURE — 88311 DECALCIFY TISSUE: CPT | Performed by: PATHOLOGY

## 2025-04-15 PROCEDURE — 88237 TISSUE CULTURE BONE MARROW: CPT

## 2025-04-15 PROCEDURE — 88305 TISSUE EXAM BY PATHOLOGIST: CPT | Mod: 26 | Performed by: PATHOLOGY

## 2025-04-15 PROCEDURE — 700111 HCHG RX REV CODE 636 W/ 250 OVERRIDE (IP): Mod: JZ | Performed by: STUDENT IN AN ORGANIZED HEALTH CARE EDUCATION/TRAINING PROGRAM

## 2025-04-15 PROCEDURE — 88184 FLOWCYTOMETRY/ TC 1 MARKER: CPT

## 2025-04-15 PROCEDURE — 160027 HCHG SURGERY MINUTES - 1ST 30 MINS LEVEL 2: Performed by: STUDENT IN AN ORGANIZED HEALTH CARE EDUCATION/TRAINING PROGRAM

## 2025-04-15 PROCEDURE — 85046 RETICYTE/HGB CONCENTRATE: CPT

## 2025-04-15 PROCEDURE — 38222 DX BONE MARROW BX & ASPIR: CPT | Performed by: STUDENT IN AN ORGANIZED HEALTH CARE EDUCATION/TRAINING PROGRAM

## 2025-04-15 PROCEDURE — 88264 CHROMOSOME ANALYSIS 20-25: CPT

## 2025-04-15 PROCEDURE — 85007 BL SMEAR W/DIFF WBC COUNT: CPT

## 2025-04-15 PROCEDURE — 85055 RETICULATED PLATELET ASSAY: CPT

## 2025-04-15 PROCEDURE — 88305 TISSUE EXAM BY PATHOLOGIST: CPT | Performed by: PATHOLOGY

## 2025-04-15 RX ORDER — DIPHENHYDRAMINE HCL 25 MG
25 TABLET ORAL ONCE
COMMUNITY

## 2025-04-15 RX ORDER — SODIUM CHLORIDE 9 MG/ML
500 INJECTION, SOLUTION INTRAVENOUS
Status: DISCONTINUED | OUTPATIENT
Start: 2025-04-15 | End: 2025-04-15 | Stop reason: HOSPADM

## 2025-04-15 RX ORDER — MIDAZOLAM HYDROCHLORIDE 1 MG/ML
.5-2 INJECTION INTRAMUSCULAR; INTRAVENOUS PRN
Status: DISCONTINUED | OUTPATIENT
Start: 2025-04-15 | End: 2025-04-15 | Stop reason: HOSPADM

## 2025-04-15 RX ORDER — ACETAMINOPHEN 325 MG/1
650 TABLET ORAL ONCE
COMMUNITY

## 2025-04-15 RX ORDER — MIDAZOLAM HYDROCHLORIDE 1 MG/ML
INJECTION INTRAMUSCULAR; INTRAVENOUS
Status: DISCONTINUED
Start: 2025-04-15 | End: 2025-04-15 | Stop reason: HOSPADM

## 2025-04-15 ASSESSMENT — PAIN DESCRIPTION - PAIN TYPE
TYPE: SURGICAL PAIN

## 2025-04-15 ASSESSMENT — FIBROSIS 4 INDEX: FIB4 SCORE: 14.56

## 2025-04-15 NOTE — PROCEDURES
Bone Marrow Biopsy/Aspiration    Date/Time: 4/15/2025 1:26 PM    Performed by: Dimas Longo M.D.  Authorized by: Dimas Longo M.D.    Consent:     Consent obtained:  Verbal and written    Consent given by:  Patient    Risks discussed:  Bleeding, infection, pain and repeat procedure    Alternatives discussed:  No treatment, delayed treatment, alternative treatment and observation  Universal protocol:     Procedure explained and questions answered to patient or proxy's satisfaction: yes      Relevant documents present and verified: yes      Immediately prior to procedure a time out was called: yes      Site/side marked: yes      Patient identity confirmed:  Hospital-assigned identification number, verbally with patient, arm band and provided demographic data  Pre-procedure details:     Procedure type:  Aspiration and biopsy    Requesting physician:  Dr. Casey    Indications:  Pancytopenia    Position:  Prone    Buttock laterality:  Left    Local anesthetic:  1% Lidocaine    Subcutaneous volume:  1 mL    : 14 mL.    Preparation: Patient was prepped and draped in usual sterile fashion    Sedation:     Patient Sedated: Yes      Sedation type: moderate (conscious) sedation      Sedation:  Midazolam    Sedation Dosage:  3 mg    Analgesia:  Fentanyl    Analgesia Dosage:  75 mcg    Sedation Start Time:  13:00 PDT    Sedation Stop Time:  13:22 PDT (22 minutes)  Procedure details:     Aspirate obtained:  5 mL followed by 5 mL    : 3 cores.    Number of attempts:  4+    Estimated blood loss (mL):  14  Post-procedure:     Puncture site:  Adhesive bandage applied and direct pressure applied    Patient tolerance of procedure:  Tolerated well, no immediate complications  Comments:      For the above procedure I also performed the conscious sedation and was directly involved with administration of medication, monitoring airway, vital signs, preventing complications.  Administered fentanyl and midazolam in titration fashion  until achieving a level of moderate procedural sedation.  Patient tolerated procedure well without complications from sedation and was left in the care of his bedside nurse and respiratory therapy. Procedural sedation time equals 22 minutes which was separate from procedure time as described above.  Start time: 1300  Stop time: 1322    First attempt: Confirmed periosteum. Cortex and marrow soft. Aspirate spiculated. Minimal compressed marrow.  Second attempt: Confirmed periosteum. Cortex and marrow soft. Unable to grasp core.  Third attempt: Confirmed periosteum. Cortex and marrow soft. Advanced biopsy needle 4 cm. Minimal compressed marrow.  Fourth attempt: Confirmed periosteum. Cortex and marrow soft. Advanced biopsy needle 4 cm. ~0.4 cm of compressed marrow.

## 2025-04-15 NOTE — DISCHARGE INSTRUCTIONS
BONE MARROW ASPIRATION & BIOPSY DISCHARGE INSTRUCTIONS    After the numbing medicine wears off, you may feel some discomfort.    Keep bandage clean and dry for 24 hours.  After this time, you can change the bandage.  You may now bathe or shower.    If bleeding occurs after your bone marrow aspiration or biopsy, apply pressure to the area and call your doctor immediately.    Call your doctor if pain persists for greater than 24 hours in the area where you had your aspiration or biopsy.    Call your doctor immediately if you notice redness or drainage in the area or if you have a fever.    Call your doctor if you have numbness or weakness in the area where the doctor took the bone marrow or down your leg.    Do not drive or drink alcohol for 24 hours if you have had sedation medication.  The medication will make you drowsy.    Resume your regular diet.    Follow up with your doctor, Dr. Casey @ , biopsy results take about 7 days, results called or letter sent        I acknowledge receipt and understanding of these Home Care Instructions.

## 2025-04-15 NOTE — OR NURSING
1329 patient arrived from procedure room, report received, attached to monitoring. VSS, patient oxygenating well on room air, band aide to left hip, clean/dry/intact, denies pain and nausea, tolerates sips of water     1336 spouse, Anthony brought to bedside      1343 dc instructions discussed with patient and patient spouse    1359 patient getting dressed, piv dc     1403 patient dc home in stable condition at this time, vss, denies pain and nausea, left hip band aide continues clean/dry/intact, all belongings with patient and accounted for, escorted out via wc

## 2025-04-26 ENCOUNTER — HOSPITAL ENCOUNTER (EMERGENCY)
Facility: MEDICAL CENTER | Age: 75
End: 2025-04-26
Attending: EMERGENCY MEDICINE
Payer: MEDICARE

## 2025-04-26 ENCOUNTER — APPOINTMENT (OUTPATIENT)
Dept: RADIOLOGY | Facility: MEDICAL CENTER | Age: 75
End: 2025-04-26
Attending: EMERGENCY MEDICINE
Payer: MEDICARE

## 2025-04-26 VITALS
HEART RATE: 61 BPM | RESPIRATION RATE: 18 BRPM | TEMPERATURE: 98 F | DIASTOLIC BLOOD PRESSURE: 72 MMHG | BODY MASS INDEX: 32.08 KG/M2 | SYSTOLIC BLOOD PRESSURE: 160 MMHG | WEIGHT: 204.37 LBS | OXYGEN SATURATION: 97 % | HEIGHT: 67 IN

## 2025-04-26 DIAGNOSIS — S09.90XA CLOSED HEAD INJURY, INITIAL ENCOUNTER: ICD-10-CM

## 2025-04-26 PROCEDURE — 70450 CT HEAD/BRAIN W/O DYE: CPT

## 2025-04-26 PROCEDURE — 99284 EMERGENCY DEPT VISIT MOD MDM: CPT

## 2025-04-26 ASSESSMENT — FIBROSIS 4 INDEX: FIB4 SCORE: 8.23

## 2025-04-26 NOTE — ED TRIAGE NOTES
Chief Complaint   Patient presents with    Head Injury     Ambulates to triage reports hitting head on the nose of the solid statue dog after bending down to wipe it. +dizziness. -thinners. Has hx of low platelet count. Pt acting appropriately in triage. Aaox4. Gcs of 15.      Educated on triage process. Instructed to notify staff for any worsening symptoms. Denies any recent travel. Denies exposure to known covid positive patients. Denies any respiratory symptoms.

## 2025-04-26 NOTE — ED PROVIDER NOTES
ED Provider Note    CHIEF COMPLAINT  Chief Complaint   Patient presents with    Head Injury     Ambulates to triage reports hitting head on the nose of the solid statue dog after bending down to wipe it. +dizziness. -thinners. Has hx of low platelet count. Pt acting appropriately in triage. Aaox4. Gcs of 15.        EXTERNAL RECORDS REVIEWED  Outpatient notes history of pancytopenia with most recent platelets of 46    HPI/ROS    LIMITATION TO HISTORY   Select: : None    OUTSIDE HISTORIAN(S):      Carmita Miller is a 75 y.o. female who presents to the emergency department with report of head injury.  Patient was bending down to feed her actual dog when she hit her head on a dog statue.  Did not lose conscious no nausea no vomiting minimal pain in the forehead at this time no neck pain.  Patient is concerned however because she has a history of pancytopenia has had problems with thrombocytopenia recently and other bleeding issues.  No other acute symptom change or concern at this time.    PAST MEDICAL HISTORY   has a past medical history of Arthritis (04/11/2025), Bowel habit changes (04/11/2025), Breath shortness (04/11/2025), Breathing problem (04/11/2025), Cataract (04/11/2025), Hip fracture requiring operative repair, right, closed, with routine healing, subsequent encounter (05/03/2022), Pain (04/11/2025), Patient denies medical problems, and Thrombocytopenia (HCC).    SURGICAL HISTORY   has a past surgical history that includes orif, hip (Right, 04/29/2022); tonsillectomy; other (04/11/2025); and bone aspiration biopsy (Left, 4/15/2025).    FAMILY HISTORY  Family History   Problem Relation Age of Onset    Cancer Mother         Non-small cell Lung cancer    Rheumatologic Disease Mother     Heart Disease Father     Cancer Father         MALT    Heart Disease Sister     Cancer Sister         neuroendocrine Ca    Cancer Brother         basal cell Ca, melanoma    Anemia Maternal Uncle     Cancer Paternal Aunt         " colon Ca    Cancer Paternal Aunt         colon    Cancer Paternal Uncle         prostate    Heart Disease Maternal Grandmother         70    Heart Disease Maternal Grandfather         91    Cancer Paternal Grandfather         RCC    Ovarian Cancer Other     Diabetes Neg Hx     Breast Cancer Neg Hx        SOCIAL HISTORY  Social History     Tobacco Use    Smoking status: Former     Current packs/day: 0.00     Average packs/day: 0.3 packs/day for 20.0 years (5.0 ttl pk-yrs)     Types: Cigarettes     Start date: 1993     Quit date: 2013     Years since quittin.9    Smokeless tobacco: Never   Vaping Use    Vaping status: Never Used   Substance and Sexual Activity    Alcohol use: Yes     Comment: very rarely    Drug use: Not Currently    Sexual activity: Not on file       CURRENT MEDICATIONS  Home Medications       Reviewed by Rhea Hudson R.N. (Registered Nurse) on 25 at 1500  Med List Status: Partial     Medication Last Dose Status   acetaminophen (TYLENOL) 325 MG Tab  Active   alendronate (FOSAMAX) 70 MG Tab  Active   diphenhydrAMINE (BENADRYL) 25 MG Tab  Active   Krill Oil 500 MG Cap  Active   Multiple Vitamins-Minerals (PRESERVISION AREDS 2 PO)  Active   TAVALISSE 100 MG Tab  Active                    ALLERGIES  Allergies   Allergen Reactions    Ibuprofen Unspecified     Interfers with platelet issue at present    Seasonal Runny Nose and Itching     hayfever    [place] Runny Nose and Itching     Pine pollen       PHYSICAL EXAM  VITAL SIGNS: BP (!) 152/67   Pulse 66   Temp 36.2 °C (97.1 °F) (Temporal)   Resp 16   Ht 1.689 m (5' 6.5\")   Wt 92.7 kg (204 lb 5.9 oz)   SpO2 96%   BMI 32.49 kg/m²      Pulse ox interpretation: I interpret this pulse ox as normal.  Constitutional: Alert and oriented x 3, minimal distress  HEENT: Minimal abrasion and ecchymosis central forehead.  Normocephalic, pupils are equal round reactive to light extraocular movements are intact. The nares is clear, " external ears are normal, mouth shows moist mucous membranes normal dentition for age  Neck: Supple, no JVD no tracheal deviation  Cardiovascular: Regular rate and rhythm no murmur rub or gallop 2+ pulses peripherally x4  Thorax & Lungs: No respiratory distress, no wheezes rales or rhonchi, No chest tenderness.   GI: Soft nontender nondistended positive bowel sounds, no peritoneal signs  Skin: Warm dry no acute rash or lesion  Musculoskeletal: Moving all extremities with full range and 5 of 5 strength no acute  deformity  Neurologic: Cranial nerves III through XII are grossly intact no sensory deficit no cerebellar dysfunction   Psychiatric: Appropriate affect for situation at this time          RADIOLOGY/PROCEDURES   I have independently interpreted the diagnostic imaging associated with this visit and am waiting the final reading from the radiologist.   My preliminary interpretation is as follows: No acute intracranial abnormality    Radiologist interpretation:  CT-HEAD W/O   Final Result      No acute intracranial abnormality.                      COURSE & MEDICAL DECISION MAKING    ASSESSMENT, COURSE AND PLAN      Care Narrative: Pleasant 75-year-old female with minimal head trauma however concerns of thrombocytopenia therefore head CT was obtained.  This is thankfully negative for any acute abnormality.  Given instructions for return to the emergency department immediately for increased headache, nausea, vomiting, confusion, loss of balance, changes in vision, changes in hearing, any other acute symptom change or concern.    Otherwise follow-up primary care as necessary discharged stable and improved condition.       ADDITIONAL PROBLEMS MANAGED    DISPOSITION AND DISCUSSIONS    I have discussed management of the patient with the following physicians and GISSELLE's:      Discussion of management with other QHP or appropriate source(s):      Escalation of care considered, and ultimately not performed:    Barriers to  "care at this time, including but not limited to: .     Decision tools and prescription drugs considered including, but not limited to: .  BP (!) 160/72   Pulse 61   Temp 36.7 °C (98 °F)   Resp 18   Ht 1.689 m (5' 6.5\")   Wt 92.7 kg (204 lb 5.9 oz)   SpO2 97%   BMI 32.49 kg/m²     Cami Carrington M.D.  [address] [place] [id number] [phone]          [facility], Emergency Dept  [address] [facility] [address] [phone]    in 12-24 hours if symptoms persist, immediately If symptoms worsen, or if you develop any other symptoms or concerns      FINAL DIAGNOSIS  1. Closed head injury, initial encounter Active        Electronically signed by: Phong Mohr M.D.    "

## 2025-05-02 ENCOUNTER — APPOINTMENT (OUTPATIENT)
Dept: ADMISSIONS | Facility: MEDICAL CENTER | Age: 75
End: 2025-05-02
Payer: MEDICARE

## 2025-05-07 ENCOUNTER — HOSPITAL ENCOUNTER (OUTPATIENT)
Facility: MEDICAL CENTER | Age: 75
End: 2025-05-07
Attending: INTERNAL MEDICINE | Admitting: INTERNAL MEDICINE
Payer: MEDICARE

## 2025-05-07 VITALS
RESPIRATION RATE: 20 BRPM | SYSTOLIC BLOOD PRESSURE: 169 MMHG | OXYGEN SATURATION: 97 % | TEMPERATURE: 97.5 F | HEIGHT: 66 IN | WEIGHT: 197.31 LBS | DIASTOLIC BLOOD PRESSURE: 68 MMHG | HEART RATE: 66 BPM | BODY MASS INDEX: 31.71 KG/M2

## 2025-05-07 DIAGNOSIS — D69.6 THROMBOCYTOPENIA (HCC): Chronic | ICD-10-CM

## 2025-05-07 LAB
ERYTHROCYTE [DISTWIDTH] IN BLOOD BY AUTOMATED COUNT: 63.1 FL (ref 35.9–50)
HCT VFR BLD AUTO: 27.9 % (ref 37–47)
HGB BLD-MCNC: 9.3 G/DL (ref 12–16)
HGB RETIC QN AUTO: 39.5 PG/CELL (ref 29–35)
IMM RETICS NFR: 12.2 % (ref 2.6–16.1)
MCH RBC QN AUTO: 35.5 PG (ref 27–33)
MCHC RBC AUTO-ENTMCNC: 33.3 G/DL (ref 32.2–35.5)
MCV RBC AUTO: 106.5 FL (ref 81.4–97.8)
PATHOLOGY CONSULT NOTE: NORMAL
PLATELET # BLD AUTO: 50 K/UL (ref 164–446)
PLATELETS.RETICULATED NFR BLD AUTO: 1.3 % (ref 0.6–13.1)
PMV BLD AUTO: 10.6 FL (ref 9–12.9)
RBC # BLD AUTO: 2.62 M/UL (ref 4.2–5.4)
RETICS # AUTO: 0.08 M/UL (ref 0.04–0.12)
RETICS/RBC NFR: 2.9 % (ref 0.8–2.6)
WBC # BLD AUTO: 1.9 K/UL (ref 4.8–10.8)

## 2025-05-07 PROCEDURE — 88360 TUMOR IMMUNOHISTOCHEM/MANUAL: CPT | Mod: 26 | Performed by: PATHOLOGY

## 2025-05-07 PROCEDURE — 160025 RECOVERY II MINUTES (STATS): Performed by: STUDENT IN AN ORGANIZED HEALTH CARE EDUCATION/TRAINING PROGRAM

## 2025-05-07 PROCEDURE — 160038 HCHG SURGERY MINUTES - EA ADDL 1 MIN LEVEL 2: Performed by: STUDENT IN AN ORGANIZED HEALTH CARE EDUCATION/TRAINING PROGRAM

## 2025-05-07 PROCEDURE — 99152 MOD SED SAME PHYS/QHP 5/>YRS: CPT | Performed by: STUDENT IN AN ORGANIZED HEALTH CARE EDUCATION/TRAINING PROGRAM

## 2025-05-07 PROCEDURE — 85027 COMPLETE CBC AUTOMATED: CPT

## 2025-05-07 PROCEDURE — 38221 DX BONE MARROW BIOPSIES: CPT | Performed by: STUDENT IN AN ORGANIZED HEALTH CARE EDUCATION/TRAINING PROGRAM

## 2025-05-07 PROCEDURE — 85046 RETICYTE/HGB CONCENTRATE: CPT

## 2025-05-07 PROCEDURE — 85097 BONE MARROW INTERPRETATION: CPT | Performed by: PATHOLOGY

## 2025-05-07 PROCEDURE — 160046 HCHG PACU - 1ST 60 MINS PHASE II: Performed by: STUDENT IN AN ORGANIZED HEALTH CARE EDUCATION/TRAINING PROGRAM

## 2025-05-07 PROCEDURE — 88311 DECALCIFY TISSUE: CPT | Performed by: PATHOLOGY

## 2025-05-07 PROCEDURE — 88305 TISSUE EXAM BY PATHOLOGIST: CPT | Mod: 26 | Performed by: PATHOLOGY

## 2025-05-07 PROCEDURE — 700111 HCHG RX REV CODE 636 W/ 250 OVERRIDE (IP): Mod: JZ | Performed by: STUDENT IN AN ORGANIZED HEALTH CARE EDUCATION/TRAINING PROGRAM

## 2025-05-07 PROCEDURE — 160027 HCHG SURGERY MINUTES - 1ST 30 MINS LEVEL 2: Performed by: STUDENT IN AN ORGANIZED HEALTH CARE EDUCATION/TRAINING PROGRAM

## 2025-05-07 PROCEDURE — 88313 SPECIAL STAINS GROUP 2: CPT | Mod: 26 | Performed by: PATHOLOGY

## 2025-05-07 PROCEDURE — 88305 TISSUE EXAM BY PATHOLOGIST: CPT | Mod: 59 | Performed by: PATHOLOGY

## 2025-05-07 PROCEDURE — 160048 HCHG OR STATISTICAL LEVEL 1-5: Performed by: STUDENT IN AN ORGANIZED HEALTH CARE EDUCATION/TRAINING PROGRAM

## 2025-05-07 PROCEDURE — 88313 SPECIAL STAINS GROUP 2: CPT | Performed by: PATHOLOGY

## 2025-05-07 PROCEDURE — 160015 HCHG STAT PREOP MINUTES: Performed by: STUDENT IN AN ORGANIZED HEALTH CARE EDUCATION/TRAINING PROGRAM

## 2025-05-07 PROCEDURE — 88311 DECALCIFY TISSUE: CPT | Mod: 26 | Performed by: PATHOLOGY

## 2025-05-07 PROCEDURE — 88360 TUMOR IMMUNOHISTOCHEM/MANUAL: CPT | Performed by: PATHOLOGY

## 2025-05-07 PROCEDURE — 85055 RETICULATED PLATELET ASSAY: CPT

## 2025-05-07 PROCEDURE — 36415 COLL VENOUS BLD VENIPUNCTURE: CPT

## 2025-05-07 RX ORDER — MIDAZOLAM HYDROCHLORIDE 1 MG/ML
.5-2 INJECTION INTRAMUSCULAR; INTRAVENOUS PRN
Status: DISCONTINUED | OUTPATIENT
Start: 2025-05-07 | End: 2025-05-07 | Stop reason: HOSPADM

## 2025-05-07 RX ORDER — SODIUM CHLORIDE 9 MG/ML
500 INJECTION, SOLUTION INTRAVENOUS
Status: DISCONTINUED | OUTPATIENT
Start: 2025-05-07 | End: 2025-05-07 | Stop reason: HOSPADM

## 2025-05-07 RX ORDER — MIDAZOLAM HYDROCHLORIDE 1 MG/ML
INJECTION INTRAMUSCULAR; INTRAVENOUS
Status: COMPLETED
Start: 2025-05-07 | End: 2025-05-07

## 2025-05-07 ASSESSMENT — PAIN DESCRIPTION - PAIN TYPE
TYPE: SURGICAL PAIN

## 2025-05-07 ASSESSMENT — FIBROSIS 4 INDEX: FIB4 SCORE: 8.23

## 2025-05-07 NOTE — PROCEDURES
Bone Marrow Biopsy/Aspiration    Date/Time: 5/7/2025 1:11 PM    Performed by: Dimas Longo M.D.  Authorized by: Dimas Longo M.D.    Consent:     Consent obtained:  Verbal    Risks discussed:  Bleeding, infection, pain and repeat procedure    Alternatives discussed:  Alternative treatment, delayed treatment and no treatment  Universal protocol:     Procedure explained and questions answered to patient or proxy's satisfaction: yes      Relevant documents present and verified: yes      Immediately prior to procedure a time out was called: yes      Site/side marked: yes      Patient identity confirmed:  Hospital-assigned identification number, verbally with patient, arm band and provided demographic data  Pre-procedure details:     Procedure type:  Biopsy    Requesting physician:  Dr. Casey    Indications:  Pancytopenia    Position:  Prone    Buttock laterality:  Left    Local anesthetic:  1% Lidocaine    Subcutaneous volume:  1 mL    : 14 mL.    Preparation: Patient was prepped and draped in usual sterile fashion    Sedation:     Patient Sedated: Yes      Sedation type: moderate (conscious) sedation      Sedation:  Midazolam    Sedation Dosage:  3 mg    Analgesia:  Fentanyl    Analgesia Dosage:  50 mcg    Sedation Start Time:  12:44 PDT    Sedation Stop Time:  13:05 PDT    SEDATION LENGTH: 21 minutes.  Procedure details:     : Clot, demineralized marrow.    Estimated blood loss (mL):  8  Post-procedure:     Puncture site:  Adhesive bandage applied and direct pressure applied    Patient tolerance of procedure:  Tolerated well, no immediate complications  Comments:      For the above procedure I also performed the conscious sedation and was directly involved with administration of medication, monitoring airway, vital signs, preventing complications.  Administered fentanyl and midazolam in titration fashion until achieving a level of moderate procedural sedation.  Patient tolerated procedure well without  complications from sedation and was left in the care of his bedside nurse and respiratory therapy. Procedural sedation time equals 21 minutes which was separate from procedure time as described above.  Start time: 1244  Stop time: 1305    First attempt: Confirmed periosteum. Jamshidi advanced ~3 cm. ~0.1 cm of compress core, mostly debris obtained and inadequate for a slide.  Second attempt: Confirmed periosteum. Jamshidi advanced ~3cm. Applied syringe and negative pressure during withdrawal. Aspirated liquid marrow without any core in Jamshidi. No convincing marrow particles in the aspirate.  Fourth attempt: Confirmed periosteum. Jamshidi advanced ~4cm. Obtained demineralized core with fat droplets but inadequate for quality slide.    Further attempts aborted. Plan for core-only was discussed with Oncologist Dr. Casey prior to procedure. He was notified post-procedure that procedurally we are unable to obtain an adequate core for morphology.

## 2025-05-07 NOTE — OR NURSING
1309- pt arrives from procedure room to PACU 1, report received from RN. Pt place on monitor. VSS,  NAD noted. On RA  Dressing to (L) hip- CDI    1330-discharge instructions given to pt and - verbalize understanding    1336- PIV d/c'd, and pt ambulates out to lobby with steady gait.   All belongings accounted for.

## 2025-05-29 PROBLEM — D61.818 AUTOIMMUNE PANCYTOPENIA (HCC): Status: ACTIVE | Noted: 2025-05-29

## 2025-06-03 ENCOUNTER — HOSPITAL ENCOUNTER (OUTPATIENT)
Dept: RADIOLOGY | Facility: MEDICAL CENTER | Age: 75
End: 2025-06-03
Attending: INTERNAL MEDICINE
Payer: MEDICARE

## 2025-06-03 DIAGNOSIS — E04.2 MULTIPLE THYROID NODULES: ICD-10-CM

## 2025-06-03 PROCEDURE — 76536 US EXAM OF HEAD AND NECK: CPT

## (undated) DEVICE — DRAPE LARGE 3 QUARTER - (20/CA)

## (undated) DEVICE — SUTURE 1 VICRYL PLUS CTX - 36 INCH (36/BX)

## (undated) DEVICE — SODIUM CHL IRRIGATION 0.9% 1000ML (12EA/CA)

## (undated) DEVICE — DRESSING PETROLEUM GAUZE 5 X 9" (50EA/BX 4BX/CA)"

## (undated) DEVICE — MASK ANESTHESIA ADULT  - (100/CA)

## (undated) DEVICE — SYRINGE NON SAFETY 5 CC 20 GA X 1-1/2 IN (100/BX 4BX/CA)

## (undated) DEVICE — SLEEVE, VASO, THIGH, MED

## (undated) DEVICE — CANISTER SUCTION 3000ML MECHANICAL FILTER AUTO SHUTOFF MEDI-VAC NONSTERILE LF DISP  (40EA/CA)

## (undated) DEVICE — SUCTION INSTRUMENT YANKAUER BULBOUS TIP W/O VENT (50EA/CA)

## (undated) DEVICE — SET LEADWIRE 5 LEAD BEDSIDE DISPOSABLE ECG (1SET OF 5/EA)

## (undated) DEVICE — DRAPE SURG STERI-DRAPE 7X11OD - (40EA/CA)

## (undated) DEVICE — PACK TOTAL HIP - (1/CA)

## (undated) DEVICE — DRAPE SURGICAL U 77X120 - (10/CA)

## (undated) DEVICE — BANDAID SHEER STRIP 3/4 IN (100EA/BX 12BX/CA)

## (undated) DEVICE — TUBING CLEARLINK DUO-VENT - C-FLO (48EA/CA)

## (undated) DEVICE — DRESSING ABDOMINAL PAD STERILE 8 X 10" (360EA/CA)"

## (undated) DEVICE — STAPLER SKIN DISP - (6/BX 10BX/CA) VISISTAT

## (undated) DEVICE — SOD. CHL 10CC SYRINGE PREFILL - W/10 CC (30/BX)

## (undated) DEVICE — CHLORAPREP 26 ML APPLICATOR - ORANGE TINT(25/CA)

## (undated) DEVICE — LACTATED RINGERS INJ 1000 ML - (14EA/CA 60CA/PF)

## (undated) DEVICE — ELECTRODE 850 FOAM ADHESIVE - HYDROGEL RADIOTRNSPRNT (50/PK)

## (undated) DEVICE — HEAD HOLDER JUNIOR/ADULT

## (undated) DEVICE — SPONGE GAUZESTER 4 X 4 4PLY - (128PK/CA)

## (undated) DEVICE — BOVIE BLADE COATED - (50/PK)

## (undated) DEVICE — CANNULA DIVIDED ADULT CO2 - SAMPLE W/FEMALE CONNCT (25/CA)

## (undated) DEVICE — TOWEL STOP TIMEOUT SAFETY FLAG (40EA/CA)

## (undated) DEVICE — DRESSING LEUKOMED STERILE 11.75X4IN - (50/CA)

## (undated) DEVICE — SUTURE 2-0 VICRYL PLUS CT-1 36 (36PK/BX)"

## (undated) DEVICE — GLOVE BIOGEL PI ORTHO SZ 7.5 PF LF (40PR/BX)

## (undated) DEVICE — ELECTRODE DUAL RETURN W/ CORD - (50/PK)

## (undated) DEVICE — SYRINGE 3 CC 22 GA X 1-1/2 - NDL SAFETY (50/BX 8BX/CA) DELETE AND POINT TO PREMIER 65230

## (undated) DEVICE — DRAPE 36X28IN RAD CARM BND BG - (25/CA) O

## (undated) DEVICE — SET EXTENSION WITH 2 PORTS (48EA/CA) ***PART #2C8610 IS A SUBSTITUTE*****

## (undated) DEVICE — GOWN WARMING STANDARD FLEX - (30/CA)

## (undated) DEVICE — SUTURE GENERAL

## (undated) DEVICE — WIRE GUIDE 3.2MM 400MM (1TX10+1TX4+2TX3=20)

## (undated) DEVICE — DRAPE IOBAN II 23 IN X 33 IN - (10/BX)

## (undated) DEVICE — DRAPE C ARMOR (12EA/CA)

## (undated) DEVICE — GLOVE BIOGEL PI INDICATOR SZ 8.5 SURGICAL PF LF - (50PR/BX 4BX/CA)

## (undated) DEVICE — SUTURE 0 VICRYL PLUS CTX - 36 INCH (36/BX)

## (undated) DEVICE — DRAPE STRLE REG TOWEL 18X24 - (10/BX 4BX/CA)"

## (undated) DEVICE — NEPTUNE 4 PORT MANIFOLD - (20/PK)

## (undated) DEVICE — PROTECTOR ULNA NERVE - (36PR/CA)

## (undated) DEVICE — HANDPIECE 10FT INTPLS SCT PLS IRRIGATION HAND CONTROL SET (6/PK)

## (undated) DEVICE — WATER IRRIGATION STERILE 1000ML (12EA/CA)

## (undated) DEVICE — SENSOR OXIMETER ADULT SPO2 RD SET (20EA/BX)

## (undated) DEVICE — GLOVE BIOGEL INDICATOR SZ 7.5 SURGICAL PF LTX - (50PR/BX 4BX/CA)

## (undated) DEVICE — KIT ANESTHESIA W/CIRCUIT & 3/LT BAG W/FILTER (20EA/CA)

## (undated) DEVICE — TIP INTPLS HFLO ML ORFC BTRY - (12/CS)  FOR SURGILAV